# Patient Record
Sex: MALE | Race: WHITE | Employment: OTHER | URBAN - METROPOLITAN AREA
[De-identification: names, ages, dates, MRNs, and addresses within clinical notes are randomized per-mention and may not be internally consistent; named-entity substitution may affect disease eponyms.]

---

## 2023-01-04 ENCOUNTER — APPOINTMENT (OUTPATIENT)
Dept: GENERAL RADIOLOGY | Age: 63
DRG: 143 | End: 2023-01-04
Attending: STUDENT IN AN ORGANIZED HEALTH CARE EDUCATION/TRAINING PROGRAM
Payer: MEDICAID

## 2023-01-04 ENCOUNTER — HOSPITAL ENCOUNTER (INPATIENT)
Age: 63
LOS: 14 days | Discharge: NURSING, REHAB OR LONG-TERM CARE | DRG: 143 | End: 2023-01-18
Attending: STUDENT IN AN ORGANIZED HEALTH CARE EDUCATION/TRAINING PROGRAM | Admitting: INTERNAL MEDICINE
Payer: MEDICAID

## 2023-01-04 DIAGNOSIS — R77.8 ELEVATED TROPONIN: ICD-10-CM

## 2023-01-04 DIAGNOSIS — J96.01 ACUTE RESPIRATORY FAILURE WITH HYPOXIA (HCC): Primary | ICD-10-CM

## 2023-01-04 DIAGNOSIS — J10.1 INFLUENZA A: ICD-10-CM

## 2023-01-04 DIAGNOSIS — J18.9 PNEUMONIA OF BOTH LUNGS DUE TO INFECTIOUS ORGANISM, UNSPECIFIED PART OF LUNG: ICD-10-CM

## 2023-01-04 DIAGNOSIS — E87.0 HYPERNATREMIA: ICD-10-CM

## 2023-01-04 DIAGNOSIS — N17.9 AKI (ACUTE KIDNEY INJURY) (HCC): ICD-10-CM

## 2023-01-04 LAB
ALBUMIN SERPL-MCNC: 2.7 G/DL (ref 3.5–5)
ALBUMIN/GLOB SERPL: 0.7 (ref 1.1–2.2)
ALP SERPL-CCNC: 152 U/L (ref 45–117)
ALT SERPL-CCNC: 14 U/L (ref 12–78)
ANION GAP SERPL CALC-SCNC: 5 MMOL/L (ref 5–15)
AST SERPL-CCNC: 19 U/L (ref 15–37)
ATRIAL RATE: 97 BPM
BASOPHILS # BLD: 0 K/UL (ref 0–0.1)
BASOPHILS NFR BLD: 0 % (ref 0–1)
BILIRUB SERPL-MCNC: 0.6 MG/DL (ref 0.2–1)
BNP SERPL-MCNC: ABNORMAL PG/ML
BUN SERPL-MCNC: 48 MG/DL (ref 6–20)
BUN/CREAT SERPL: 32 (ref 12–20)
CALCIUM SERPL-MCNC: 9.1 MG/DL (ref 8.5–10.1)
CALCULATED P AXIS, ECG09: 56 DEGREES
CALCULATED R AXIS, ECG10: 85 DEGREES
CALCULATED T AXIS, ECG11: 76 DEGREES
CHLORIDE SERPL-SCNC: 118 MMOL/L (ref 97–108)
CO2 SERPL-SCNC: 34 MMOL/L (ref 21–32)
COVID-19 RAPID TEST, COVR: NOT DETECTED
CREAT SERPL-MCNC: 1.49 MG/DL (ref 0.7–1.3)
DIAGNOSIS, 93000: NORMAL
DIFFERENTIAL METHOD BLD: ABNORMAL
EOSINOPHIL # BLD: 0 K/UL (ref 0–0.4)
EOSINOPHIL NFR BLD: 0 % (ref 0–7)
ERYTHROCYTE [DISTWIDTH] IN BLOOD BY AUTOMATED COUNT: 17.7 % (ref 11.5–14.5)
FLUAV AG NPH QL IA: POSITIVE
FLUBV AG NOSE QL IA: NEGATIVE
GLOBULIN SER CALC-MCNC: 3.7 G/DL (ref 2–4)
GLUCOSE SERPL-MCNC: 118 MG/DL (ref 65–100)
HCT VFR BLD AUTO: 31.8 % (ref 36.6–50.3)
HGB BLD-MCNC: 8.8 G/DL (ref 12.1–17)
IMM GRANULOCYTES # BLD AUTO: 0.2 K/UL (ref 0–0.04)
IMM GRANULOCYTES NFR BLD AUTO: 1 % (ref 0–0.5)
LACTATE BLD-SCNC: 0.88 MMOL/L (ref 0.4–2)
LYMPHOCYTES # BLD: 0.7 K/UL (ref 0.8–3.5)
LYMPHOCYTES NFR BLD: 4 % (ref 12–49)
MAGNESIUM SERPL-MCNC: 2.3 MG/DL (ref 1.6–2.4)
MCH RBC QN AUTO: 25.8 PG (ref 26–34)
MCHC RBC AUTO-ENTMCNC: 27.7 G/DL (ref 30–36.5)
MCV RBC AUTO: 93.3 FL (ref 80–99)
MONOCYTES # BLD: 0.7 K/UL (ref 0–1)
MONOCYTES NFR BLD: 4 % (ref 5–13)
NEUTS SEG # BLD: 15 K/UL (ref 1.8–8)
NEUTS SEG NFR BLD: 91 % (ref 32–75)
NRBC # BLD: 0.03 K/UL (ref 0–0.01)
NRBC BLD-RTO: 0.2 PER 100 WBC
P-R INTERVAL, ECG05: 162 MS
PHOSPHATE SERPL-MCNC: 3.4 MG/DL (ref 2.6–4.7)
PLATELET # BLD AUTO: 162 K/UL (ref 150–400)
PMV BLD AUTO: 11.6 FL (ref 8.9–12.9)
POTASSIUM SERPL-SCNC: 3.8 MMOL/L (ref 3.5–5.1)
PROT SERPL-MCNC: 6.4 G/DL (ref 6.4–8.2)
Q-T INTERVAL, ECG07: 404 MS
QRS DURATION, ECG06: 130 MS
QTC CALCULATION (BEZET), ECG08: 513 MS
RBC # BLD AUTO: 3.41 M/UL (ref 4.1–5.7)
RBC MORPH BLD: ABNORMAL
RBC MORPH BLD: ABNORMAL
SODIUM SERPL-SCNC: 157 MMOL/L (ref 136–145)
SOURCE, COVRS: NORMAL
TROPONIN-HIGH SENSITIVITY: 132 NG/L (ref 0–76)
VENTRICULAR RATE, ECG03: 97 BPM
WBC # BLD AUTO: 16.6 K/UL (ref 4.1–11.1)

## 2023-01-04 PROCEDURE — 87040 BLOOD CULTURE FOR BACTERIA: CPT

## 2023-01-04 PROCEDURE — 74011000250 HC RX REV CODE- 250: Performed by: GENERAL ACUTE CARE HOSPITAL

## 2023-01-04 PROCEDURE — 65270000046 HC RM TELEMETRY

## 2023-01-04 PROCEDURE — 36415 COLL VENOUS BLD VENIPUNCTURE: CPT

## 2023-01-04 PROCEDURE — 74011250636 HC RX REV CODE- 250/636: Performed by: STUDENT IN AN ORGANIZED HEALTH CARE EDUCATION/TRAINING PROGRAM

## 2023-01-04 PROCEDURE — 74011000258 HC RX REV CODE- 258: Performed by: INTERNAL MEDICINE

## 2023-01-04 PROCEDURE — 85025 COMPLETE CBC W/AUTO DIFF WBC: CPT

## 2023-01-04 PROCEDURE — 87635 SARS-COV-2 COVID-19 AMP PRB: CPT

## 2023-01-04 PROCEDURE — 93005 ELECTROCARDIOGRAM TRACING: CPT

## 2023-01-04 PROCEDURE — 84100 ASSAY OF PHOSPHORUS: CPT

## 2023-01-04 PROCEDURE — 74011250637 HC RX REV CODE- 250/637: Performed by: GENERAL ACUTE CARE HOSPITAL

## 2023-01-04 PROCEDURE — 74011250636 HC RX REV CODE- 250/636: Performed by: INTERNAL MEDICINE

## 2023-01-04 PROCEDURE — 83880 ASSAY OF NATRIURETIC PEPTIDE: CPT

## 2023-01-04 PROCEDURE — 99285 EMERGENCY DEPT VISIT HI MDM: CPT

## 2023-01-04 PROCEDURE — 83605 ASSAY OF LACTIC ACID: CPT

## 2023-01-04 PROCEDURE — 74011000258 HC RX REV CODE- 258: Performed by: STUDENT IN AN ORGANIZED HEALTH CARE EDUCATION/TRAINING PROGRAM

## 2023-01-04 PROCEDURE — 71045 X-RAY EXAM CHEST 1 VIEW: CPT

## 2023-01-04 PROCEDURE — 83735 ASSAY OF MAGNESIUM: CPT

## 2023-01-04 PROCEDURE — 84484 ASSAY OF TROPONIN QUANT: CPT

## 2023-01-04 PROCEDURE — 87804 INFLUENZA ASSAY W/OPTIC: CPT

## 2023-01-04 PROCEDURE — 80053 COMPREHEN METABOLIC PANEL: CPT

## 2023-01-04 PROCEDURE — 74011250636 HC RX REV CODE- 250/636: Performed by: GENERAL ACUTE CARE HOSPITAL

## 2023-01-04 RX ORDER — OSELTAMIVIR PHOSPHATE 30 MG/1
30 CAPSULE ORAL 2 TIMES DAILY
Status: COMPLETED | OUTPATIENT
Start: 2023-01-04 | End: 2023-01-09

## 2023-01-04 RX ORDER — ACETAMINOPHEN 325 MG/1
650 TABLET ORAL
Status: DISCONTINUED | OUTPATIENT
Start: 2023-01-04 | End: 2023-01-18 | Stop reason: HOSPADM

## 2023-01-04 RX ORDER — ENOXAPARIN SODIUM 100 MG/ML
40 INJECTION SUBCUTANEOUS DAILY
Status: DISCONTINUED | OUTPATIENT
Start: 2023-01-05 | End: 2023-01-18 | Stop reason: HOSPADM

## 2023-01-04 RX ORDER — SODIUM CHLORIDE 9 MG/ML
50 INJECTION, SOLUTION INTRAVENOUS CONTINUOUS
Status: DISPENSED | OUTPATIENT
Start: 2023-01-04 | End: 2023-01-04

## 2023-01-04 RX ORDER — ONDANSETRON 2 MG/ML
4 INJECTION INTRAMUSCULAR; INTRAVENOUS
Status: DISCONTINUED | OUTPATIENT
Start: 2023-01-04 | End: 2023-01-18 | Stop reason: HOSPADM

## 2023-01-04 RX ORDER — ONDANSETRON 4 MG/1
4 TABLET, ORALLY DISINTEGRATING ORAL
Status: DISCONTINUED | OUTPATIENT
Start: 2023-01-04 | End: 2023-01-18 | Stop reason: HOSPADM

## 2023-01-04 RX ORDER — ACETAMINOPHEN 650 MG/1
650 SUPPOSITORY RECTAL
Status: DISCONTINUED | OUTPATIENT
Start: 2023-01-04 | End: 2023-01-18 | Stop reason: HOSPADM

## 2023-01-04 RX ORDER — SODIUM CHLORIDE 0.9 % (FLUSH) 0.9 %
5-40 SYRINGE (ML) INJECTION EVERY 8 HOURS
Status: DISCONTINUED | OUTPATIENT
Start: 2023-01-04 | End: 2023-01-18 | Stop reason: HOSPADM

## 2023-01-04 RX ORDER — POLYETHYLENE GLYCOL 3350 17 G/17G
17 POWDER, FOR SOLUTION ORAL DAILY PRN
Status: DISCONTINUED | OUTPATIENT
Start: 2023-01-04 | End: 2023-01-18 | Stop reason: HOSPADM

## 2023-01-04 RX ORDER — LEVOFLOXACIN 5 MG/ML
750 INJECTION, SOLUTION INTRAVENOUS ONCE
Status: COMPLETED | OUTPATIENT
Start: 2023-01-04 | End: 2023-01-04

## 2023-01-04 RX ORDER — DEXTROSE MONOHYDRATE 50 MG/ML
50 INJECTION, SOLUTION INTRAVENOUS CONTINUOUS
Status: DISCONTINUED | OUTPATIENT
Start: 2023-01-04 | End: 2023-01-18 | Stop reason: HOSPADM

## 2023-01-04 RX ORDER — SODIUM CHLORIDE 0.9 % (FLUSH) 0.9 %
5-40 SYRINGE (ML) INJECTION AS NEEDED
Status: DISCONTINUED | OUTPATIENT
Start: 2023-01-04 | End: 2023-01-18 | Stop reason: HOSPADM

## 2023-01-04 RX ADMIN — CEFEPIME 2 G: 2 INJECTION, POWDER, FOR SOLUTION INTRAVENOUS at 10:05

## 2023-01-04 RX ADMIN — SODIUM CHLORIDE, PRESERVATIVE FREE 10 ML: 5 INJECTION INTRAVENOUS at 18:16

## 2023-01-04 RX ADMIN — OSELTAMIVIR PHOSPHATE 30 MG: 30 CAPSULE ORAL at 20:35

## 2023-01-04 RX ADMIN — SODIUM CHLORIDE, PRESERVATIVE FREE 10 ML: 5 INJECTION INTRAVENOUS at 20:35

## 2023-01-04 RX ADMIN — PIPERACILLIN AND TAZOBACTAM 4.5 G: 4; .5 INJECTION, POWDER, FOR SOLUTION INTRAVENOUS at 18:34

## 2023-01-04 RX ADMIN — DEXTROSE MONOHYDRATE 75 ML/HR: 50 INJECTION, SOLUTION INTRAVENOUS at 18:34

## 2023-01-04 RX ADMIN — SODIUM CHLORIDE 50 ML/HR: 9 INJECTION, SOLUTION INTRAVENOUS at 15:29

## 2023-01-04 RX ADMIN — LEVOFLOXACIN 750 MG: 5 INJECTION, SOLUTION INTRAVENOUS at 10:08

## 2023-01-04 RX ADMIN — SODIUM CHLORIDE 500 ML: 9 INJECTION, SOLUTION INTRAVENOUS at 10:02

## 2023-01-04 NOTE — ED NOTES
Patient is asking for sips of water but on npo    said patient had a Sips of water   Around 60 ml yesterday and had no issues.

## 2023-01-04 NOTE — PROGRESS NOTES
Patient had 6 beats of VT   MD notified via [employer]  Patient had 12 beats of VT Patient is AO x 3. No complaints of chest pain, palpitation. EKG completed   Made night RN aware that unable to add on lab. Bedside and Verbal shift change report given to Venice Xiong RN (oncoming nurse) by Eulalio Hartmann RN (offgoing nurse). Report included the following information SBAR, Kardex, and Quality Measures.

## 2023-01-04 NOTE — H&P
Hospitalist Admission Note    NAME: Mahamed Diana   :  1960   MRN:  [id number]     Date/Time:  2023 5:35 PM    Patient PCP: Unknown, Provider, MD  ______________________________________________________________________  Given the patient's current clinical presentation, I have a high level of concern for decompensation if discharged from the emergency department. Complex decision making was performed, which includes reviewing the patient's available past medical records, laboratory results, and x-ray films. My assessment of this patient's clinical condition and my plan of care is as follows. Assessment / Plan:    Acute on chronic hypoxic respiratory failure  Trach dislodgement  Influenza A infection   H/o Head and neck Ca  On Hospice care   HARVEY, Cr was 1.1 on 22  Hypernatremia/hyperchloremia/dehydration     Anemia    Start Abx  Wean off O2 as possible, pt requires high flow now, will need to have that addressed prior to DC back to NH  Start tamiflu   F/up with CM re hospice status   ABx  Start IVF w D5W. I/O and daily weight   Resume tube feeding at low rate, nutritionist consult. Pt already on PEG tube feeding at NH   Pt does not seem to be on any meds at NH  As per report pt just started on low volume po fluids, will cont that for now    Addendum  Called back by on call Hospice NP from [address], will re assess pt in see if he meets for GIP. Code Status: DNR, was under hospice  Surrogate Decision Maker: Chantal Collet, [phone]. Hospice/[facility] [phone]  DVT Prophylaxis: lovenox   GI Prophylaxis: not indicated  Baseline: PEG/Trach under hospice care at [facility]      Subjective:   CHIEF COMPLAINT: Trach dislodged     HISTORY OF PRESENT ILLNESS:     Mahamed Diana is a 57 y.o.  male who presents with the above CC.   Pt with h/o GI bleed, liver cirrhosis, metastatic head and neck Ca s/p PEG/Trach, CAP w Pseudomonas, Severe protein calorie deficiency, from NH under hospice care, pt noted to have Trach partially dislodged and NH staph could not replace it, EMS called, pt found to be sating ~75%. Pt did not report any unusual symptoms other than feeling thirsty. I called [facility]/hospice, awaiting call back from on call NP. XR CHEST PORT    Result Date: 1/4/2023  Hyperinflated lung fields with diffuse bilateral interstitial infiltrates. We were asked to admit for work up and evaluation of the above problems. Past Medical History:   Diagnosis Date    HARVEY (acute kidney injury) (CMS/HCC) 05/17/2021    Anxiety 08/06/2021    History of chemotherapy    Hx of radiation therapy    Hypertrophic obstructive cardiomyopathy (CMS/HCC) 02/02/2021   Echocardiogram 01/20/2021 showed concentric left ventricular hypertrophy most prominent in the interventricular septum, hyperkinetic systolic function ejection fraction of 70%, no significant valvular disease, normal right heart size and pressures, moderate to severe dynamic LVOT with mean gradient of 34 mmHg across the outflow tract, HOCM. Oropharyngeal cancer (CMS/HCC)    Other cirrhosis of liver (CMS/HCC) 05/11/2021    PEG (percutaneous endoscopic gastrostomy) status (CMS/HCC) 02/12/2021   Is supposed to be feeding with two pascale 5 times a day but only managing 4 due to \"clogging\". Presence of tracheostomy (CMS/HCC) 02/12/2021   Occasional bleeding in inner cannula. Not using humidifcation.  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 Luis Conns, SINGLE    ND LARYNGOSCOPY,DIRCT,OP SCOPE,BIOPSY N/A 01/14/2021   Procedure: DIRECT LARYNGOSCOPY /W BIOPSY; Surgeon: Dolly Lang MD; Location: [facility] Main OR; Service: ENT       Past Medical History:   Diagnosis Date    Cancer ([facility] [address]     Tongue and lung        Past Surgical History:   Procedure Laterality Date    HX OTHER SURGICAL      HX VASCULAR ACCESS      ND UNLISTED PROCEDURE ABDOMEN PERITONEUM & OMENTUM         Social History     Tobacco Use    Smoking status: Not on file    Smokeless tobacco: Not on file   Substance Use Topics    Alcohol use: Not on file        Family History:   Diabetes Mother    Cancer Mother    Cancer Father     No Known Allergies     Prior to Admission medications    Not on File       REVIEW OF SYSTEMS:     Total of 12 systems reviewed, negative except for the above. I am not able to complete the review of systems because: The patient is intubated and sedated    The patient has altered mental status due to his acute medical problems    The patient has baseline aphasia from prior stroke(s)    The patient has baseline dementia and is not reliable historian    The patient is in acute medical distress and unable to provide information             POSITIVE= underlined text  Negative = text not underlined  General:  fever, chills, sweats, generalized weakness, weight loss/gain,      loss of appetite   Eyes:    blurred vision, eye pain, loss of vision, double vision  ENT:    rhinorrhea, pharyngitis   Respiratory:   cough, sputum production, SOB, CAI, wheezing, pleuritic pain.  Trach dislodgment    Cardiology:   chest pain, palpitations, orthopnea, PND, edema, syncope   Gastrointestinal:  abdominal pain , N/V, diarrhea, dysphagia, constipation, bleeding   Genitourinary:  frequency, urgency, dysuria, hematuria, incontinence   Muskuloskeletal :  arthralgia, myalgia, back pain  Hematology:  easy bruising, nose or gum bleeding, lymphadenopathy Dermatological: rash, ulceration, pruritis, color change / jaundice  Endocrine:   hot flashes or polydipsia   Neurological:  headache, dizziness, confusion, focal weakness, paresthesia,     Speech difficulties, memory loss, gait difficulty  Psychological: Feelings of anxiety, depression, agitation    Objective:   VITALS:    Visit Vitals  /76   Pulse 89   Temp 97.8 °F (36.6 °C)   Resp 21   Wt 53.7 kg (118 lb 6.2 oz)   SpO2 95%     No intake or output data in the 24 hours ending 01/04/23 1735   Wt Readings from Last 10 Encounters:   01/04/23 53.7 kg (118 lb 6.2 oz)       PHYSICAL EXAM:    General:    Alert, cooperative, no distress, ill appearing, cachectic. HEENT: Atraumatic, anicteric sclerae, pink conjunctivae, MMM  Neck:  Supple, symmetrical  Lungs:   CTA. No Wheezing/Rhonchi. No rales. No tenderness  No Accessory muscle use. Trach  CVS:   Regular rhythm. No murmur. No JVD   GI/:   Soft, NT. ND.  BS normal. PEG  Extremities: No edema. No cyanosis. No clubbing. Skin:     Not pale. Not Jaundiced. No rashes   Psych:  Good insight. Not depressed. Not anxious or agitated.   Neurologic: Alert and awake    _______________________________________________________________________  Care Plan discussed with:    Comments   Patient x    Family      RN x    Care Manager                    Consultant:      _______________________________________________________________________  Expected  Disposition:   Home with Family x   HH/PT/OT/RN    SNF/LTC    CONOR    ________________________________________________________________________  TOTAL TIME:  54 Minutes    Critical Care Provided     Minutes non procedure based      Comments    x Reviewed previous records   >50% of visit spent in counseling and coordination of care x Discussion with patient and/or family and questions answered       ________________________________________________________________________  Signed: Dao White MD    Procedures: see electronic medical records for all procedures/Xrays and details which were not copied into this note but were reviewed prior to creation of Plan. LAB DATA REVIEWED:    Recent Results (from the past 24 hour(s))   CBC WITH AUTOMATED DIFF    Collection Time: 01/04/23  7:00 AM   Result Value Ref Range    WBC 16.6 (H) 4.1 - 11.1 K/uL    RBC 3.41 (L) 4.10 - 5.70 M/uL    HGB 8.8 (L) 12.1 - 17.0 g/dL    HCT 31.8 (L) 36.6 - 50.3 %    MCV 93.3 80.0 - 99.0 FL    MCH 25.8 (L) 26.0 - 34.0 PG    MCHC 27.7 (L) 30.0 - 36.5 g/dL    RDW 17.7 (H) 11.5 - 14.5 %    PLATELET 546 506 - 622 K/uL    MPV 11.6 8.9 - 12.9 FL    NRBC 0.2 (H) 0  WBC    ABSOLUTE NRBC 0.03 (H) 0.00 - 0.01 K/uL    NEUTROPHILS 91 (H) 32 - 75 %    LYMPHOCYTES 4 (L) 12 - 49 %    MONOCYTES 4 (L) 5 - 13 %    EOSINOPHILS 0 0 - 7 %    BASOPHILS 0 0 - 1 %    IMMATURE GRANULOCYTES 1 (H) 0.0 - 0.5 %    ABS. NEUTROPHILS 15.0 (H) 1.8 - 8.0 K/UL    ABS. LYMPHOCYTES 0.7 (L) 0.8 - 3.5 K/UL    ABS. MONOCYTES 0.7 0.0 - 1.0 K/UL    ABS. EOSINOPHILS 0.0 0.0 - 0.4 K/UL    ABS. BASOPHILS 0.0 0.0 - 0.1 K/UL    ABS. IMM. GRANS. 0.2 (H) 0.00 - 0.04 K/UL    DF SMEAR SCANNED      RBC COMMENTS ANISOCYTOSIS  1+        RBC COMMENTS HYPOCHROMIA  1+       METABOLIC PANEL, COMPREHENSIVE    Collection Time: 01/04/23  7:00 AM   Result Value Ref Range    Sodium 157 (H) 136 - 145 mmol/L    Potassium 3.8 3.5 - 5.1 mmol/L    Chloride 118 (H) 97 - 108 mmol/L    CO2 34 (H) 21 - 32 mmol/L    Anion gap 5 5 - 15 mmol/L    Glucose 118 (H) 65 - 100 mg/dL    BUN 48 (H) 6 - 20 MG/DL    Creatinine 1.49 (H) 0.70 - 1.30 MG/DL    BUN/Creatinine ratio 32 (H) 12 - 20      eGFR 52 (L) >60 ml/min/1.73m2    Calcium 9.1 8.5 - 10.1 MG/DL    Bilirubin, total 0.6 0.2 - 1.0 MG/DL    ALT (SGPT) 14 12 - 78 U/L    AST (SGOT) 19 15 - 37 U/L    Alk.  phosphatase 152 (H) 45 - 117 U/L    Protein, total 6.4 6.4 - 8.2 g/dL    Albumin 2.7 (L) 3.5 - 5.0 g/dL    Globulin 3.7 2.0 - 4.0 g/dL    A-G Ratio 0.7 (L) 1.1 - 2.2     NT-PRO BNP Collection Time: 01/04/23  7:00 AM   Result Value Ref Range    NT pro-BNP 31,223 (H) <125 PG/ML   TROPONIN-HIGH SENSITIVITY    Collection Time: 01/04/23  7:00 AM   Result Value Ref Range    Troponin-High Sensitivity 132 (HH) 0 - 76 ng/L   COVID-19 RAPID TEST    Collection Time: 01/04/23  7:00 AM   Result Value Ref Range    Specimen source Nasopharyngeal      COVID-19 rapid test Not detected NOTD     INFLUENZA A+B VIRAL AGS    Collection Time: 01/04/23  7:00 AM   Result Value Ref Range    Influenza A Antigen Positive (A) NEG      Influenza B Antigen Negative NEG     EKG, 12 LEAD, INITIAL    Collection Time: 01/04/23  7:10 AM   Result Value Ref Range    Ventricular Rate 97 BPM    Atrial Rate 97 BPM    P-R Interval 162 ms    QRS Duration 130 ms    Q-T Interval 404 ms    QTC Calculation (Bezet) 513 ms    Calculated P Axis 56 degrees    Calculated R Axis 85 degrees    Calculated T Axis 76 degrees    Diagnosis       Sinus rhythm with occasional premature ventricular complexes  Right bundle branch block  No previous ECGs available  Confirmed by Solis Pimentel ([id number]) on 1/4/2023 10:26:00 AM     POC LACTIC ACID    Collection Time: 01/04/23  7:19 AM   Result Value Ref Range    Lactic Acid (POC) 0.88 0.40 - 2.00 mmol/L     XR CHEST PORT    Result Date: 1/4/2023  Hyperinflated lung fields with diffuse bilateral interstitial infiltrates.            Current Medications:     Current Facility-Administered Medications:     0.9% sodium chloride infusion, 50 mL/hr, IntraVENous, CONTINUOUS, Mariela Tavares MD, Last Rate: 50 mL/hr at 01/04/23 1529, 50 mL/hr at 01/04/23 1529

## 2023-01-04 NOTE — ED NOTES
Patient is stable   Hooked in cardiac monitor   No complaints of pain   On high flow at 30 LPM   Tracheostomy intact  PEG tube intact  IV cannula infusing well   All due medications given   Report given to [address] [place] [address]

## 2023-01-04 NOTE — PROGRESS NOTES
Patient arrived on NRB and dislodged trach around neck. #4 shiley cuffless trach was reinserted without difficulty and patient was placed on 10L 50% trach collar. Patient's trach was suctioned with copious green secretions.

## 2023-01-04 NOTE — ED NOTES
Visited by Morris Sarmiento ([phone])    jostin followed up with the patient   Asked if he still want to go back to hospice   And patient responded yes by nodding

## 2023-01-04 NOTE — ED PROVIDER NOTES
EMERGENCY DEPARTMENT HISTORY AND PHYSICAL EXAM      Date: 1/4/2023  Patient Name: Tamica Butts    History of Presenting Illness     Chief Complaint   Patient presents with    Respiratory Distress     Pt arrives via EMS from [facility]. C/o trach dislodged and facility unable to replace. HPI: Tamica Butts, 57 y.o. male presents to the ED with cc of trach dislodged. History obtained from EMS as the patient cannot verbalize at baseline. Overnight, his trach dislodged at some point. Was saturating in the 76s on EMS arrival.  He denies being on oxygen chronically. Has an uncuffed trach at baseline. Has history of head and neck cancer per EMS. Says he was actually feeling sick for a few days, unable to elaborate further than this. There are no other complaints, changes, or physical findings at this time. PCP: No primary care provider on file. No current facility-administered medications on file prior to encounter. No current outpatient medications on file prior to encounter. Past History     Past Medical History:  Head and neck cancer    Past Surgical History:  Trach, PEG    Family History:  History reviewed. No pertinent family history. Social History: Allergies:  No Known Allergies      Physical Exam   Physical Exam  Constitutional:       General: He is not in acute distress. Appearance: He is not toxic-appearing. HENT:      Head: Normocephalic. Mouth/Throat:      Mouth: Mucous membranes are moist.   Eyes:      Extraocular Movements: Extraocular movements intact. Neck:      Comments: Trach stoma appears well-healed, visible tract to trachea, no purulence, bleeding, or discharge surrounding it  Cardiovascular:      Rate and Rhythm: Regular rhythm. Tachycardia present. Pulmonary:      Comments: Breath sounds are coarse diffusely, wet cough intermittently, no significant accessory muscle use  Abdominal:      Palpations: Abdomen is soft. Tenderness: There is no abdominal tenderness. Comments: PEG in place without surrounding erythema or induration   Musculoskeletal:         General: No deformity. Skin:     General: Skin is warm and dry. Neurological:      Mental Status: He is alert. Comments: He is alert, nonverbal, follows simple commands, will shake head in response to simple question   Psychiatric:         Mood and Affect: Mood normal.       Diagnostic Study Results     Labs -     Recent Results (from the past 24 hour(s))   CBC WITH AUTOMATED DIFF    Collection Time: 01/04/23  7:00 AM   Result Value Ref Range    WBC 16.6 (H) 4.1 - 11.1 K/uL    RBC 3.41 (L) 4.10 - 5.70 M/uL    HGB 8.8 (L) 12.1 - 17.0 g/dL    HCT 31.8 (L) 36.6 - 50.3 %    MCV 93.3 80.0 - 99.0 FL    MCH 25.8 (L) 26.0 - 34.0 PG    MCHC 27.7 (L) 30.0 - 36.5 g/dL    RDW 17.7 (H) 11.5 - 14.5 %    PLATELET 570 196 - 448 K/uL    MPV 11.6 8.9 - 12.9 FL    NRBC 0.2 (H) 0  WBC    ABSOLUTE NRBC 0.03 (H) 0.00 - 0.01 K/uL    NEUTROPHILS 91 (H) 32 - 75 %    LYMPHOCYTES 4 (L) 12 - 49 %    MONOCYTES 4 (L) 5 - 13 %    EOSINOPHILS 0 0 - 7 %    BASOPHILS 0 0 - 1 %    IMMATURE GRANULOCYTES 1 (H) 0.0 - 0.5 %    ABS. NEUTROPHILS 15.0 (H) 1.8 - 8.0 K/UL    ABS. LYMPHOCYTES 0.7 (L) 0.8 - 3.5 K/UL    ABS. MONOCYTES 0.7 0.0 - 1.0 K/UL    ABS. EOSINOPHILS 0.0 0.0 - 0.4 K/UL    ABS. BASOPHILS 0.0 0.0 - 0.1 K/UL    ABS. IMM.  GRANS. 0.2 (H) 0.00 - 0.04 K/UL    DF SMEAR SCANNED      RBC COMMENTS ANISOCYTOSIS  1+        RBC COMMENTS HYPOCHROMIA  1+       METABOLIC PANEL, COMPREHENSIVE    Collection Time: 01/04/23  7:00 AM   Result Value Ref Range    Sodium 157 (H) 136 - 145 mmol/L    Potassium 3.8 3.5 - 5.1 mmol/L    Chloride 118 (H) 97 - 108 mmol/L    CO2 34 (H) 21 - 32 mmol/L    Anion gap 5 5 - 15 mmol/L    Glucose 118 (H) 65 - 100 mg/dL    BUN 48 (H) 6 - 20 MG/DL    Creatinine 1.49 (H) 0.70 - 1.30 MG/DL    BUN/Creatinine ratio 32 (H) 12 - 20      eGFR 52 (L) >60 ml/min/1.73m2    Calcium 9.1 8.5 - 10.1 MG/DL    Bilirubin, total 0.6 0.2 - 1.0 MG/DL    ALT (SGPT) 14 12 - 78 U/L    AST (SGOT) 19 15 - 37 U/L    Alk. phosphatase 152 (H) 45 - 117 U/L    Protein, total 6.4 6.4 - 8.2 g/dL    Albumin 2.7 (L) 3.5 - 5.0 g/dL    Globulin 3.7 2.0 - 4.0 g/dL    A-G Ratio 0.7 (L) 1.1 - 2.2     NT-PRO BNP    Collection Time: 01/04/23  7:00 AM   Result Value Ref Range    NT pro-BNP 31,223 (H) <125 PG/ML   TROPONIN-HIGH SENSITIVITY    Collection Time: 01/04/23  7:00 AM   Result Value Ref Range    Troponin-High Sensitivity 132 (HH) 0 - 76 ng/L   COVID-19 RAPID TEST    Collection Time: 01/04/23  7:00 AM   Result Value Ref Range    Specimen source Nasopharyngeal      COVID-19 rapid test Not detected NOTD     INFLUENZA A+B VIRAL AGS    Collection Time: 01/04/23  7:00 AM   Result Value Ref Range    Influenza A Antigen Positive (A) NEG      Influenza B Antigen Negative NEG     EKG, 12 LEAD, INITIAL    Collection Time: 01/04/23  7:10 AM   Result Value Ref Range    Ventricular Rate 97 BPM    Atrial Rate 97 BPM    P-R Interval 162 ms    QRS Duration 130 ms    Q-T Interval 404 ms    QTC Calculation (Bezet) 513 ms    Calculated P Axis 56 degrees    Calculated R Axis 85 degrees    Calculated T Axis 76 degrees    Diagnosis       Sinus rhythm with occasional premature ventricular complexes  Right bundle branch block  No previous ECGs available     POC LACTIC ACID    Collection Time: 01/04/23  7:19 AM   Result Value Ref Range    Lactic Acid (POC) 0.88 0.40 - 2.00 mmol/L       Radiologic Studies -   XR CHEST PORT   Final Result      Hyperinflated lung fields with diffuse bilateral interstitial infiltrates. CT Results  (Last 48 hours)      None          CXR Results  (Last 48 hours)                 01/04/23 0741  XR CHEST PORT Final result    Impression:      Hyperinflated lung fields with diffuse bilateral interstitial infiltrates.            Narrative:  EXAM:  XR CHEST PORT       INDICATION: Cough, shortness of breath COMPARISON: none       TECHNIQUE: portable chest AP view       FINDINGS: The cardiac silhouette is within normal limits. The pulmonary   vasculature is within normal limits. The lungs are hyperinflated. Diffuse bilateral interstitial infiltrates are   noted. Port-A-Cath is present with the tip projecting over the SVC. Tracheostomy   tube projects in satisfactory position. The visualized bones and upper abdomen   are age-appropriate. Medical Decision Making   I am the first provider for this patient. I reviewed the vital signs, available nursing notes, past medical history, past surgical history, family history and social history. Vital Signs-Reviewed the patient's vital signs. Patient Vitals for the past 24 hrs:   Temp Pulse Resp BP SpO2   01/04/23 0815 -- 93 21 92/64 93 %   01/04/23 0741 -- 99 20 107/83 90 %   01/04/23 0726 99.5 °F (37.5 °C) 97 21 106/72 90 %   01/04/23 0722 -- -- -- -- 90 %   01/04/23 0657 -- -- -- -- (!) 83 %   01/04/23 0651 -- (!) 105 -- -- 92 %   01/04/23 0645 -- -- -- -- 93 %         Provider Notes (Medical Decision Making):   73-year-old male presenting with dislodged trach. Difficult historian given he is nonverbal, chronically trached and pegged. Apparently has not been feeling well for a few days, and trach dislodged overnight. The trach is easily placed back into the stoma with improvement in saturations to the low 90s. However, continues to have junky breath sounds and desats again to the low 80s. ED Course:     Initial assessment performed. The patients presenting problems have been discussed, and they are in agreement with the care plan formulated and outlined with them. I have encouraged them to ask questions as they arise throughout their visit. Patient is suctioned, placed back on oxygen with improvement in saturations to 90%. Differential includes bronchitis, pneumonia, mucous plug, COVID or influenza, pulmonary edema.             I spoken with the patient's emergency contact, Mr. Mick Quiroz, the patient gives me permission to discuss his medical information with him. The emergency contact states that as far as he knows, the patient would want to be admitted to the hospital and treated, he makes his own medical decisions, confirms it is difficult to communicate with him, however symptoms he will right. Spoke with the patient's hospice facility to help get more information. As far as they know, he also is his own medical decision maker as he checked himself into hospice initially. The only information as far as other contact members are consistent with a gentleman already spoke with. They do not know more about his recent health. CBC with leukocytosis of 16.6, anemia hemoglobin 8.8, basic metabolic panel with elevated BUN/creatinine, with elevated BUN of 48, suspect likely HARVEY and dehydration. Sodium is elevated at 157. Troponin is  Elevated 132. The patient denies chest pain. I suspect likely demand ischemia in setting of critical illness. Influenza is positive, he is informed of this. Chest x-ray shows diffuse bilateral interstitial infiltrates. Given elevated white blood cell count and hypoxia, he will also be treated for pneumonia. IV antibiotics ordered. On reevaluation, he is saturating 88% on oxygen through his trach, he is placed on nasal cannula as well, called respiratory therapy for suction, they have also placed him on heated high flow, this improves his saturations to 93%. He continues to breathe comfortably. EKG is performed at 7: 10, shows sinus rhythm at a rate of 97, , , QTc 513, axis upright, right bundle branch block, no ST segment elevation or depression concerning for ACS, this is interpreted independently by myself  as sinus rhythm with right bundle branch block and PVC. Patient is resting comfortably on reevaluation, continues to saturate well on high flow.   Attempted to communicate further with him regarding his wishes. He does not nod his head when asked if he wants to be admitted to the hospital and treated. I have attempted to get him to write on a piece of paper, however he does not successfully do so. Critical Care Time:     I have spent 75 minutes of critical care time in evaluating and treating this patient. This includes time spent at bedside, time with family and decision makers, documentation, review of labs and imaging, and/or consultation with specialists. It does not include time spent on separately billed procedures. This patient presents with a critical illness or injury that acutely impairs one or more vital organ systems such that there is a high probability of imminent or life threatening deterioration in the patient's condition. This case involved decision making of high complexity to assess, manipulate, and support vital organ system failure and/or to prevent further life threatening deterioration of the patient's condition. Failure to initiate these interventions on an urgent basis would likely result in sudden, clinically significant or life threatening deterioration in the patient's condition. Abnormal findings supporting critical care: Hypoxia, elevated troponin, tachycardia  Interventions to support critical care: Oxygen, IV fluid, antibiotics, heated high flow  Failure to intervene may result in: Hypoxic respiratory failure, cardiopulmonary compromise, dehydration, electrolyte abnormalities    Disposition:  Admit to stepdown      PLAN:  1. There are no discharge medications for this patient.     2.   Follow-up Information    None       Return to ED if worse     Diagnosis     Clinical Impression: Acute hypoxic respiratory failure secondary to influenza and pneumonia, acute dislodged trach, HARVEY, acute hyponatremia, acute elevated troponin, chronic trach

## 2023-01-04 NOTE — ED NOTES
Per EMS, Pt found alert and oriented and \"didn't look sick\" in bed on scene on room air, O2 75%. Increased to 95% with NRB. Blood pressure en route 86/68, 500mL NS administered, updated BP 98/64. Reported right sided jaw pain, but Pt is currently pointing to left side of jaw. Hx lung, throat, mouth, tongue cancer. Gurgling sounds noted on scene, en route, and upon arrival to ED.

## 2023-01-04 NOTE — PROGRESS NOTES
Pharmacy Dosing Services: 1/4/2023      The following medication: zosyn was automatically dose-adjusted per P&T Committee Protocol, with respect to renal function. Previous regimen: zosyn 2.25 g q 6 h   New Regimen: zosyn 3.375 g q 8 h       Pt Weight:   Wt Readings from Last 1 Encounters:   01/04/23 53.7 kg (118 lb 6.2 oz)         Serum Creatinine Lab Results   Component Value Date/Time    Creatinine 1.49 (H) 01/04/2023 07:00 AM       Creatinine Clearance CrCl cannot be calculated (Unknown ideal weight.). BUN Lab Results   Component Value Date/Time    BUN 48 (H) 01/04/2023 07:00 AM         Additional notes:      Pharmacy to continue to monitor patient daily. Will make dosage adjustments based upon changing renal function.   Reta SotoD  Remote Order Verification Pharmacist  [email or web address] Phone # [phone]

## 2023-01-04 NOTE — ED NOTES
Patient is awake   Nodding he is ok and comfortable   Vitally stable   Saturation of 99 percent   Still in high flow machine

## 2023-01-05 LAB
ALBUMIN SERPL-MCNC: 2.3 G/DL (ref 3.5–5)
ALBUMIN/GLOB SERPL: 0.7 (ref 1.1–2.2)
ALP SERPL-CCNC: 122 U/L (ref 45–117)
ALT SERPL-CCNC: 13 U/L (ref 12–78)
ANION GAP SERPL CALC-SCNC: 2 MMOL/L (ref 5–15)
AST SERPL-CCNC: 22 U/L (ref 15–37)
ATRIAL RATE: 87 BPM
BASOPHILS # BLD: 0 K/UL (ref 0–0.1)
BASOPHILS NFR BLD: 0 % (ref 0–1)
BILIRUB SERPL-MCNC: 0.6 MG/DL (ref 0.2–1)
BUN SERPL-MCNC: 46 MG/DL (ref 6–20)
BUN/CREAT SERPL: 31 (ref 12–20)
CALCIUM SERPL-MCNC: 8.6 MG/DL (ref 8.5–10.1)
CALCULATED P AXIS, ECG09: 99 DEGREES
CALCULATED R AXIS, ECG10: 76 DEGREES
CALCULATED T AXIS, ECG11: 64 DEGREES
CHLORIDE SERPL-SCNC: 116 MMOL/L (ref 97–108)
CO2 SERPL-SCNC: 34 MMOL/L (ref 21–32)
CREAT SERPL-MCNC: 1.5 MG/DL (ref 0.7–1.3)
DIAGNOSIS, 93000: NORMAL
DIFFERENTIAL METHOD BLD: ABNORMAL
EOSINOPHIL # BLD: 0 K/UL (ref 0–0.4)
EOSINOPHIL NFR BLD: 0 % (ref 0–7)
ERYTHROCYTE [DISTWIDTH] IN BLOOD BY AUTOMATED COUNT: 17.8 % (ref 11.5–14.5)
GLOBULIN SER CALC-MCNC: 3.5 G/DL (ref 2–4)
GLUCOSE SERPL-MCNC: 160 MG/DL (ref 65–100)
HCT VFR BLD AUTO: 28.1 % (ref 36.6–50.3)
HGB BLD-MCNC: 8 G/DL (ref 12.1–17)
IMM GRANULOCYTES # BLD AUTO: 0.1 K/UL (ref 0–0.04)
IMM GRANULOCYTES NFR BLD AUTO: 1 % (ref 0–0.5)
LYMPHOCYTES # BLD: 0.6 K/UL (ref 0.8–3.5)
LYMPHOCYTES NFR BLD: 6 % (ref 12–49)
MAGNESIUM SERPL-MCNC: 2.1 MG/DL (ref 1.6–2.4)
MCH RBC QN AUTO: 26 PG (ref 26–34)
MCHC RBC AUTO-ENTMCNC: 28.5 G/DL (ref 30–36.5)
MCV RBC AUTO: 91.2 FL (ref 80–99)
MONOCYTES # BLD: 0.4 K/UL (ref 0–1)
MONOCYTES NFR BLD: 4 % (ref 5–13)
NEUTS SEG # BLD: 8.4 K/UL (ref 1.8–8)
NEUTS SEG NFR BLD: 89 % (ref 32–75)
NRBC # BLD: 0.02 K/UL (ref 0–0.01)
NRBC BLD-RTO: 0.2 PER 100 WBC
P-R INTERVAL, ECG05: 226 MS
PLATELET # BLD AUTO: 124 K/UL (ref 150–400)
PMV BLD AUTO: 11.2 FL (ref 8.9–12.9)
POTASSIUM SERPL-SCNC: 3.3 MMOL/L (ref 3.5–5.1)
PROCALCITONIN SERPL-MCNC: 1.08 NG/ML
PROT SERPL-MCNC: 5.8 G/DL (ref 6.4–8.2)
Q-T INTERVAL, ECG07: 438 MS
QRS DURATION, ECG06: 126 MS
QTC CALCULATION (BEZET), ECG08: 527 MS
RBC # BLD AUTO: 3.08 M/UL (ref 4.1–5.7)
RBC MORPH BLD: ABNORMAL
SODIUM SERPL-SCNC: 152 MMOL/L (ref 136–145)
VENTRICULAR RATE, ECG03: 87 BPM
WBC # BLD AUTO: 9.5 K/UL (ref 4.1–11.1)

## 2023-01-05 PROCEDURE — 74011000258 HC RX REV CODE- 258: Performed by: INTERNAL MEDICINE

## 2023-01-05 PROCEDURE — 74011250636 HC RX REV CODE- 250/636: Performed by: INTERNAL MEDICINE

## 2023-01-05 PROCEDURE — 84145 PROCALCITONIN (PCT): CPT

## 2023-01-05 PROCEDURE — 77010033678 HC OXYGEN DAILY

## 2023-01-05 PROCEDURE — 80053 COMPREHEN METABOLIC PANEL: CPT

## 2023-01-05 PROCEDURE — 74011250637 HC RX REV CODE- 250/637: Performed by: GENERAL ACUTE CARE HOSPITAL

## 2023-01-05 PROCEDURE — 74011250636 HC RX REV CODE- 250/636: Performed by: PHYSICIAN ASSISTANT

## 2023-01-05 PROCEDURE — 65270000046 HC RM TELEMETRY

## 2023-01-05 PROCEDURE — 74011250637 HC RX REV CODE- 250/637: Performed by: INTERNAL MEDICINE

## 2023-01-05 PROCEDURE — 36415 COLL VENOUS BLD VENIPUNCTURE: CPT

## 2023-01-05 PROCEDURE — 74011000250 HC RX REV CODE- 250: Performed by: GENERAL ACUTE CARE HOSPITAL

## 2023-01-05 PROCEDURE — 74011250636 HC RX REV CODE- 250/636: Performed by: GENERAL ACUTE CARE HOSPITAL

## 2023-01-05 PROCEDURE — 83735 ASSAY OF MAGNESIUM: CPT

## 2023-01-05 PROCEDURE — 85025 COMPLETE CBC W/AUTO DIFF WBC: CPT

## 2023-01-05 RX ORDER — MORPHINE SULFATE 2 MG/ML
2 INJECTION, SOLUTION INTRAMUSCULAR; INTRAVENOUS
Status: DISCONTINUED | OUTPATIENT
Start: 2023-01-05 | End: 2023-01-06

## 2023-01-05 RX ADMIN — PIPERACILLIN AND TAZOBACTAM 3.38 G: 3; .375 INJECTION, POWDER, FOR SOLUTION INTRAVENOUS at 18:01

## 2023-01-05 RX ADMIN — ONDANSETRON 4 MG: 2 INJECTION INTRAMUSCULAR; INTRAVENOUS at 09:29

## 2023-01-05 RX ADMIN — SODIUM CHLORIDE, PRESERVATIVE FREE 10 ML: 5 INJECTION INTRAVENOUS at 05:06

## 2023-01-05 RX ADMIN — DEXTROSE MONOHYDRATE 100 ML/HR: 50 INJECTION, SOLUTION INTRAVENOUS at 18:01

## 2023-01-05 RX ADMIN — SODIUM CHLORIDE, PRESERVATIVE FREE 10 ML: 5 INJECTION INTRAVENOUS at 14:16

## 2023-01-05 RX ADMIN — OSELTAMIVIR PHOSPHATE 30 MG: 30 CAPSULE ORAL at 18:01

## 2023-01-05 RX ADMIN — POTASSIUM BICARBONATE 40 MEQ: 782 TABLET, EFFERVESCENT ORAL at 10:46

## 2023-01-05 RX ADMIN — PIPERACILLIN AND TAZOBACTAM 3.38 G: 3; .375 INJECTION, POWDER, FOR SOLUTION INTRAVENOUS at 00:29

## 2023-01-05 RX ADMIN — MORPHINE SULFATE 2 MG: 2 INJECTION, SOLUTION INTRAMUSCULAR; INTRAVENOUS at 18:19

## 2023-01-05 RX ADMIN — MORPHINE SULFATE 2 MG: 2 INJECTION, SOLUTION INTRAMUSCULAR; INTRAVENOUS at 21:44

## 2023-01-05 RX ADMIN — ONDANSETRON 4 MG: 2 INJECTION INTRAMUSCULAR; INTRAVENOUS at 18:06

## 2023-01-05 RX ADMIN — OSELTAMIVIR PHOSPHATE 30 MG: 30 CAPSULE ORAL at 09:11

## 2023-01-05 RX ADMIN — DEXTROSE MONOHYDRATE 75 ML/HR: 50 INJECTION, SOLUTION INTRAVENOUS at 09:10

## 2023-01-05 RX ADMIN — MORPHINE SULFATE 2 MG: 2 INJECTION, SOLUTION INTRAMUSCULAR; INTRAVENOUS at 00:29

## 2023-01-05 RX ADMIN — SODIUM CHLORIDE, PRESERVATIVE FREE 10 ML: 5 INJECTION INTRAVENOUS at 21:44

## 2023-01-05 RX ADMIN — MORPHINE SULFATE 2 MG: 2 INJECTION, SOLUTION INTRAMUSCULAR; INTRAVENOUS at 09:29

## 2023-01-05 RX ADMIN — ENOXAPARIN SODIUM 40 MG: 100 INJECTION SUBCUTANEOUS at 09:11

## 2023-01-05 RX ADMIN — PIPERACILLIN AND TAZOBACTAM 3.38 G: 3; .375 INJECTION, POWDER, FOR SOLUTION INTRAVENOUS at 09:11

## 2023-01-05 NOTE — WOUND CARE
Wound care consult for the left side of the neck skin tear that was present on admission. Pt. Has a Trach with a strap around the neck. There is a dry gauze pad on the wound at this time but it is wet enough to see that the skin tear is full thickness. The wound bed is pink and moist and requires moist wound care. This was discussed with nursing who will do this wound care. Will need two people to assist. One to hold the trach in place and the other to do the wound care. Wound care orders; Hold the tracheostomy tube in place. Other nurse removes the strap and does the wound care. The current dressing will need to be removed with NS. Wipe with NS wet gauze. Apply a Xeroform gauze dressing double thick and then a few 4x4s and tape with paper tape. Date the dressing. This will need to be done daily.    Rebel Marrero RN, BSN, [facility]

## 2023-01-05 NOTE — ADVANCED PRACTICE NURSE
1300 D5 continuous infusion rate changed to 100 mL  1500 Feeding rate change to 40 mL  1800 Daily wound care done on L neck skin tear  PRN morphine given x 2  PRN zofran given x 2    Bedside and Verbal shift change report given to Amarilys Gamboa RN (oncoming nurse) by Danilo Luz RN (offgoing nurse). Report included the following information SBAR, Kardex, Cardiac Rhythm NSR, and Quality Measures.

## 2023-01-05 NOTE — PROGRESS NOTES
Comprehensive Nutrition Assessment    Type and Reason for Visit: Initial, Consult, Positive nutrition screen    Nutrition Recommendations/Plan:   If continuing TF, recommend Jevity 1.5 @ 50 mL/hr + 150 mL water flushes q 4 hours (provides 1800 kcals, 77g protein, 1812 mL water)     Nutrition Assessment:    Patient medically noted for respiratory failure and influenza. PMH head and neck cancer, trach, and PEG. Patient sleeping at time of visit. TF ordered per MD; Jevity 1.5 infusing at 30 mL/hr with goal rate of 40 mL/hr. Patient previously on hospice at a facility [facility]. Per MAR, patient was receiving Nutren 2.0 75 mL bolus 3x/day; unsure if this TF order is accurate as it's only providing 450 kcals/day. Unsure if patient was taking PO as well. Significant muscle and fat wasting noted on visual assessment. Likely to resume hospice on discharge. Recommendations provided above if patient wishes to continue TF. Malnutrition Assessment:  Malnutrition Status:  Severe malnutrition (01/05/23 1106)    Context:  Chronic illness     Findings of the 6 clinical characteristics of malnutrition:   Energy Intake:  75% or less est energy requirements for 1 month or longer  Weight Loss:  Unable to assess     Body Fat Loss:  Severe body fat loss, Triceps, Fat overlying ribs, Buccal region   Muscle Mass Loss:  Severe muscle mass loss, Temples (temporalis), Clavicles (pectoralis &deltoids), Calf (gastrocnemius)  Fluid Accumulation:  No significant fluid accumulation,     Strength:  Not performed     Nutrition Related Findings:    , K+ 3.3, Phos 3.4   Effer-K, D5% IVF  Wound Type:  Wound consult pending    Current Nutrition Intake & Therapies:  Average Meal Intake: NPO     DIET NPO Ice Chips, Sips of Water with Meds, Sips of Clear Liquids, Popsicles  ADULT TUBE FEEDING PEG; Standard with Fiber; Delivery Method: Continuous; Continuous Initial Rate (mL/hr): 10; Continuous Advance Tube Feeding: Yes; Advancement Volume (mL/hr): 10; Advancement Frequency: Q 8 hours; Continuous Goal Rate (mL/hr): 40. .. Anthropometric Measures:  Height: 6' 5\" (195.6 cm)  Ideal Body Weight (IBW): 208 lbs (95 kg)     Current Body Wt:  53.7 kg (118 lb 6.2 oz), 56.9 % IBW. Current BMI (kg/m2): 14                          BMI Category: Underweight (BMI less than 18.5)    Estimated Daily Nutrient Needs:  Energy Requirements Based On: Formula  Weight Used for Energy Requirements: Current  Energy (kcal/day): 1882 kcals (BMR 1447 x 1. 3AF)  Weight Used for Protein Requirements: Current  Protein (g/day): 74-97g (1.4-1.8 g/kg bw)  Method Used for Fluid Requirements: 1 ml/kcal  Fluid (ml/day): 1900 mL    Nutrition Diagnosis:   Inadequate protein-energy intake related to inadequate enteral nutrition infusion as evidenced by severe muscle loss, severe loss of subcutaneous fat    Nutrition Interventions:   Food and/or Nutrient Delivery: Modify tube feeding  Nutrition Education/Counseling: No recommendations at this time  Coordination of Nutrition Care: Continue to monitor while inpatient       Goals:     Goals:  Tolerate nutrition support at goal rate, by next RD assessment       Nutrition Monitoring and Evaluation:   Behavioral-Environmental Outcomes: None identified  Food/Nutrient Intake Outcomes: Enteral nutrition intake/tolerance  Physical Signs/Symptoms Outcomes: Biochemical data, Skin, Weight, Fluid status or edema    Discharge Planning:    Enteral nutrition    Rafael Ballard RD  Contact: ext [phone]

## 2023-01-05 NOTE — PROGRESS NOTES
Physician Progress Note      PATIENT:               Marion Alvarado  CSN #:                  [id number]  :                       1960  ADMIT DATE:       2023 6:41 AM  DISCH DATE:  RESPONDING  PROVIDER #:        Dorothy Nation MD        QUERY TEXT:    Type of Anemia: Please provide further specificity, if known. Clinical indicators include: anemia, hgb, hct  Options provided:  -- Anemia due to acute blood loss  -- Anemia due to chronic blood loss  -- Anemia due to iron deficiency  -- Anemia due to postoperative blood loss  -- Anemia due to chronic disease  -- Other - I will add my own diagnosis  -- Disagree - Not applicable / Not valid  -- Disagree - Clinically Unable to determine / Unknown        PROVIDER RESPONSE TEXT:    The patient has anemia due to chronic blood loss. QUERY TEXT:    Patient admitted with Trach dislodgement. Per RD consult noted dated , patient noted to have severe body fat and muscle mass loss. If possible, please document in progress notes and discharge summary if you are evaluating and /or treating any of the following: The medical record reflects the following:  Risk Factors: Hx: Oropharyngeal cancer . Freddie Patella Freddie Patella C/o Trach dislodgement  Clinical Indicators: RD noted patient meets the following ASPEN criteria:  Malnutrition Status:  Severe malnutrition (23 1106)  Context:  Chronic illness  Findings of the 6 clinical characteristics of malnutrition:  Energy Intake:  75% or less est energy requirements for 1 month or longer  Weight Loss:  Unable to assess  Body Fat Loss:  Severe body fat loss, Triceps, Fat overlying ribs, Buccal region  Muscle Mass Loss:  Severe muscle mass loss, Temples (temporalis), Clavicles (pectoralis &deltoids), Calf (gastrocnemius)  Fluid Accumulation:  No significant fluid accumulation,   Strength:  Not performed  . .........................................   Treatment: RD consult; Nutrition Recommendations/Plan: If continuing TF, recommend Jevity 1.5 @ 50 mL/hr + 150 mL water flushes q 4 hours (provides 1800 kcals, 77g protein, 1812 mL water)    Thank you,    Leah MAGANA    ASPEN Criteria:  https://aspenjournals. onlinelibrary. hair. com/doi/full/10.1177/3344761322076312  Options provided:  -- Protein calorie malnutrition severe  -- Underweight with BMI 14.04  -- Other - I will add my own diagnosis  -- Disagree - Not applicable / Not valid  -- Disagree - Clinically unable to determine / Unknown  -- Refer to Clinical Documentation Reviewer    PROVIDER RESPONSE TEXT:    This patient has severe protein calorie malnutrition.     Query created by: Jose Hernandez on 1/5/2023 2:35 PM      Electronically signed by:  Margo Louise MD 1/5/2023 3:52 PM

## 2023-01-05 NOTE — PROGRESS NOTES
Hospitalist Progress Note    NAME: Sharmila Lobo   :  1960   MRN:  [id number]       Assessment / Plan:  Acute on chronic hypoxic respiratory failure  Trach dislodgement  Influenza A infection   H/o Head and neck Ca  On Hospice care   HARVEY, Cr was 1.1 on 22  Hypernatremia/hyperchloremia/dehydration     Anemia  Hypokalemia, replete      Cont' empiric IV abx, tamiflu  Nebs  D5 gtt  Cont' TF  Wean O2 as tolerated. He required 30LHF on admission, now weaned to [address] he will be discharge back to hospice with Luis Barnacle. Left a message, awaiting a call back    Trach care     Code Status: DNR, was under hospice  Surrogate Decision Maker: Cynthia Me, [phone]. Hospice/[facility] [phone]  DVT Prophylaxis: lovenox   GI Prophylaxis: not indicated  Baseline: PEG/Trach under hospice care at [facility]     Subjective:     Chief Complaint / Reason for Physician Visit  Awake, nad. Discussed with RN events overnight. Review of Systems:  Symptom Y/N Comments  Symptom Y/N Comments   Fever/Chills    Chest Pain     Poor Appetite    Edema     Cough    Abdominal Pain     Sputum    Joint Pain     SOB/CAI    Pruritis/Rash     Nausea/vomit    Tolerating PT/OT     Diarrhea    Tolerating Diet     Constipation    Other       Could NOT obtain due to:      Objective:     VITALS:   Last 24hrs VS reviewed since prior progress note.  Most recent are:  Patient Vitals for the past 24 hrs:   Temp Pulse Resp BP SpO2   23 1120 97.4 °F (36.3 °C) 83 16 98/61 97 %   23 0753 98.3 °F (36.8 °C) 88 20 122/70 94 %   23 0434 -- -- -- -- 93 %   23 0319 98 °F (36.7 °C) 88 20 110/77 94 %   23 0059 98 °F (36.7 °C) 87 18 101/64 94 %   23 0025 -- 85 16 -- 93 %   23 0024 -- -- -- -- 93 %   23 -- -- -- -- 94 %   23 1921 98 °F (36.7 °C) 86 16  95 %   23 1757 -- -- -- -- 95 %   23 1721 98 °F (36.7 °C) 86 21  --   23 1619 97.8 °F (36.6 °C) 89 21 102/76 95 %   01/04/23 1419 -- -- -- -- 95 %       Intake/Output Summary (Last 24 hours) at 1/5/2023 1408  Last data filed at 1/5/2023 5502  Gross per 24 hour   Intake 200 ml   Output 600 ml   Net -400 ml        I had a face to face encounter and independently examined this patient on 1/5/2023, as outlined below:  PHYSICAL EXAM:  General: Alert, nad, cachetic  EENT:  EOMI. Anicteric sclerae. MMM  Resp:  CTA bilaterally, no wheezing or rales. No accessory muscle use  CV:  Regular  rhythm,  No edema  GI:  Soft, Non distended, Non tender. +PEG  Neurologic:  Alert and oriented X 3, nod to questions  Psych:   Not anxious nor agitated  Skin:  No rashes. No jaundice    Reviewed most current lab test results and cultures  YES  Reviewed most current radiology test results   YES  Review and summation of old records today    NO  Reviewed patient's current orders and MAR    YES  PMH/ reviewed - no change compared to H&P  ________________________________________________________________________  Care Plan discussed with:    Comments   Patient x    Family      RN x    Care Manager     Consultant                        Multidiciplinary team rounds were held today with , nursing, pharmacist and clinical coordinator. Patient's plan of care was discussed; medications were reviewed and discharge planning was addressed. ________________________________________________________________________  Total NON critical care TIME:  35   Minutes    Total CRITICAL CARE TIME Spent:   Minutes non procedure based      Comments   >50% of visit spent in counseling and coordination of care     ________________________________________________________________________  Pooja Bear MD     Procedures: see electronic medical records for all procedures/Xrays and details which were not copied into this note but were reviewed prior to creation of Plan. LABS:  I reviewed today's most current labs and imaging studies.   Pertinent labs include:  Recent Labs     01/05/23  0030 01/04/23  0700   WBC 9.5 16.6*   HGB 8.0* 8.8*   HCT 28.1* 31.8*   * 162     Recent Labs     01/05/23  0030 01/04/23  1845 01/04/23 0700   *  --  157*   K 3.3*  --  3.8   *  --  118*   CO2 34*  --  34*   *  --  118*   BUN 46*  --  48*   CREA 1.50*  --  1.49*   CA 8.6  --  9.1   MG 2.1 2.3  --    PHOS  --  3.4  --    ALB 2.3*  --  2.7*   TBILI 0.6  --  0.6   ALT 13  --  14       Signed: Callum Gonzalez MD

## 2023-01-05 NOTE — PROGRESS NOTES
Pt has wounds on Left Neck and Right elbow. Pt not able to explain what they are from. They have dressings on them. Wound Care consult put in as they seem to be severe.

## 2023-01-06 ENCOUNTER — APPOINTMENT (OUTPATIENT)
Dept: GENERAL RADIOLOGY | Age: 63
DRG: 143 | End: 2023-01-06
Attending: PHYSICIAN ASSISTANT
Payer: MEDICAID

## 2023-01-06 LAB
ANION GAP SERPL CALC-SCNC: 2 MMOL/L (ref 5–15)
BUN SERPL-MCNC: 29 MG/DL (ref 6–20)
BUN/CREAT SERPL: 22 (ref 12–20)
CALCIUM SERPL-MCNC: 8.1 MG/DL (ref 8.5–10.1)
CHLORIDE SERPL-SCNC: 105 MMOL/L (ref 97–108)
CO2 SERPL-SCNC: 35 MMOL/L (ref 21–32)
CREAT SERPL-MCNC: 1.33 MG/DL (ref 0.7–1.3)
GLUCOSE SERPL-MCNC: 154 MG/DL (ref 65–100)
POTASSIUM SERPL-SCNC: 3.4 MMOL/L (ref 3.5–5.1)
SODIUM SERPL-SCNC: 142 MMOL/L (ref 136–145)

## 2023-01-06 PROCEDURE — 36415 COLL VENOUS BLD VENIPUNCTURE: CPT

## 2023-01-06 PROCEDURE — 74011250637 HC RX REV CODE- 250/637: Performed by: GENERAL ACUTE CARE HOSPITAL

## 2023-01-06 PROCEDURE — 65270000046 HC RM TELEMETRY

## 2023-01-06 PROCEDURE — 80048 BASIC METABOLIC PNL TOTAL CA: CPT

## 2023-01-06 PROCEDURE — 74011000250 HC RX REV CODE- 250: Performed by: GENERAL ACUTE CARE HOSPITAL

## 2023-01-06 PROCEDURE — 71045 X-RAY EXAM CHEST 1 VIEW: CPT

## 2023-01-06 PROCEDURE — 74011000258 HC RX REV CODE- 258: Performed by: INTERNAL MEDICINE

## 2023-01-06 PROCEDURE — 74011250636 HC RX REV CODE- 250/636: Performed by: PHYSICIAN ASSISTANT

## 2023-01-06 PROCEDURE — 74011250636 HC RX REV CODE- 250/636: Performed by: GENERAL ACUTE CARE HOSPITAL

## 2023-01-06 PROCEDURE — 77010033678 HC OXYGEN DAILY

## 2023-01-06 PROCEDURE — 74011250636 HC RX REV CODE- 250/636: Performed by: INTERNAL MEDICINE

## 2023-01-06 PROCEDURE — 74011250637 HC RX REV CODE- 250/637: Performed by: INTERNAL MEDICINE

## 2023-01-06 RX ORDER — FUROSEMIDE 10 MG/ML
40 INJECTION INTRAMUSCULAR; INTRAVENOUS 2 TIMES DAILY
Status: COMPLETED | OUTPATIENT
Start: 2023-01-06 | End: 2023-01-06

## 2023-01-06 RX ORDER — MORPHINE SULFATE 2 MG/ML
2 INJECTION, SOLUTION INTRAMUSCULAR; INTRAVENOUS
Status: DISCONTINUED | OUTPATIENT
Start: 2023-01-06 | End: 2023-01-18 | Stop reason: HOSPADM

## 2023-01-06 RX ORDER — OXYCODONE HYDROCHLORIDE 5 MG/1
5 TABLET ORAL
Status: DISCONTINUED | OUTPATIENT
Start: 2023-01-06 | End: 2023-01-18 | Stop reason: HOSPADM

## 2023-01-06 RX ORDER — IPRATROPIUM BROMIDE AND ALBUTEROL SULFATE 2.5; .5 MG/3ML; MG/3ML
3 SOLUTION RESPIRATORY (INHALATION)
Status: DISCONTINUED | OUTPATIENT
Start: 2023-01-06 | End: 2023-01-18 | Stop reason: HOSPADM

## 2023-01-06 RX ADMIN — PIPERACILLIN AND TAZOBACTAM 3.38 G: 3; .375 INJECTION, POWDER, FOR SOLUTION INTRAVENOUS at 18:14

## 2023-01-06 RX ADMIN — ONDANSETRON 4 MG: 2 INJECTION INTRAMUSCULAR; INTRAVENOUS at 00:17

## 2023-01-06 RX ADMIN — SODIUM CHLORIDE, PRESERVATIVE FREE 10 ML: 5 INJECTION INTRAVENOUS at 21:24

## 2023-01-06 RX ADMIN — SODIUM CHLORIDE, PRESERVATIVE FREE 5 ML: 5 INJECTION INTRAVENOUS at 13:24

## 2023-01-06 RX ADMIN — FUROSEMIDE 40 MG: 10 INJECTION, SOLUTION INTRAMUSCULAR; INTRAVENOUS at 10:55

## 2023-01-06 RX ADMIN — OSELTAMIVIR PHOSPHATE 30 MG: 30 CAPSULE ORAL at 18:21

## 2023-01-06 RX ADMIN — SODIUM CHLORIDE 500 ML: 9 INJECTION, SOLUTION INTRAVENOUS at 23:02

## 2023-01-06 RX ADMIN — FUROSEMIDE 40 MG: 10 INJECTION, SOLUTION INTRAMUSCULAR; INTRAVENOUS at 18:14

## 2023-01-06 RX ADMIN — ONDANSETRON 4 MG: 2 INJECTION INTRAMUSCULAR; INTRAVENOUS at 11:00

## 2023-01-06 RX ADMIN — MORPHINE SULFATE 2 MG: 2 INJECTION, SOLUTION INTRAMUSCULAR; INTRAVENOUS at 00:17

## 2023-01-06 RX ADMIN — PIPERACILLIN AND TAZOBACTAM 3.38 G: 3; .375 INJECTION, POWDER, FOR SOLUTION INTRAVENOUS at 08:32

## 2023-01-06 RX ADMIN — SODIUM CHLORIDE 500 ML: 9 INJECTION, SOLUTION INTRAVENOUS at 21:20

## 2023-01-06 RX ADMIN — POTASSIUM BICARBONATE 40 MEQ: 391 TABLET, EFFERVESCENT ORAL at 13:24

## 2023-01-06 RX ADMIN — PIPERACILLIN AND TAZOBACTAM 3.38 G: 3; .375 INJECTION, POWDER, FOR SOLUTION INTRAVENOUS at 00:17

## 2023-01-06 RX ADMIN — MORPHINE SULFATE 2 MG: 2 INJECTION, SOLUTION INTRAMUSCULAR; INTRAVENOUS at 13:23

## 2023-01-06 RX ADMIN — MORPHINE SULFATE 2 MG: 2 INJECTION, SOLUTION INTRAMUSCULAR; INTRAVENOUS at 08:26

## 2023-01-06 RX ADMIN — MORPHINE SULFATE 2 MG: 2 INJECTION, SOLUTION INTRAMUSCULAR; INTRAVENOUS at 03:50

## 2023-01-06 RX ADMIN — OSELTAMIVIR PHOSPHATE 30 MG: 30 CAPSULE ORAL at 10:54

## 2023-01-06 NOTE — PROGRESS NOTES
Transition of Care Plan:    RUR: 14%    Disposition:Return to [facility]    Follow up appointments: To be done by facility    DME needed:None    Transportation at Discharge: [facility]    Keys or means to access home:  N/A--Patient returning to [facility]. IM Medicare Letter:N/A--Patient has [place] Medicaid    Is patient a  and connected with the [facility]? No                If yes, was [employer] transfer form completed and [facility] notified? N/A    Caregiver Contact:Friend    Discharge Caregiver contacted prior to discharge? To be contacted    Care Conference needed?:   No        Reason for Admission:  Patient came to ed from [facility] due to trach being dislodged. RUR Score: 14%                    Plan for utilizing home health:  No        PCP: First and Last name:  Jaxon Jefferson MD     Name of Practice: [facility]     Are you a current patient: Yes/No: Yes     Approximate date of last visit: 1/4/23     Can you participate in a virtual visit with your PCP: No                    Current Advanced Directive/Advance Care Plan: DNR    Advance Care Planning     General Advance Care Planning (ACP) Conversation      Date of Conversation: 1/6/23  Conducted with: Patient with [address]:   No healthcare decision makers have been documented.    Click here to complete [address] including selection of the Healthcare Decision Maker Relationship (ie \"Primary\")      Content/Action Overview:   DECLINED ACP conversation - will revisit periodically   Reviewed DNR/DNI and patient confirms current DNR status - completed forms on file (place new order if needed)         Length of Voluntary ACP Conversation in minutes:  <16 minutes (Non-Billable)    Pepper Hagan, RN               Healthcare Decision Maker:   Click here to complete [address] including [facility] Relationship (ie \"Primary\")                             Patient plans to return to [address] [place] [address] with hospice care from [facility]. Patient has been at [facility] since last month. He has trach and peg. He has friends who come and see him. Jeri Lindsey RN BSN CRM        [phone]

## 2023-01-06 NOTE — PROGRESS NOTES
Hospitalist Progress Note    NAME: Bud Baker   :  1960   MRN:  [id number]       Assessment / Plan:  Acute on chronic hypoxic respiratory failure  Trach dislodgement  Influenza A infection   H/o Head and neck Ca  On Hospice care   HARVEY, Cr was 1.1 on 22  Hypernatremia/hyperchloremia/dehydration     Anemia  Hypokalemia, replete      Cont' empiric IV abx, tamiflu  Nebs  Stop D5gtt  Adding IV lasix x2 doses given probnp 31K. Repeat probnp tomorrow  Cont' TF  Wean O2 as tolerated. He required 30LHF on admission, now weaned to 10LHF  He will be discharge back to hospice with Mir De. I spoke to the pt today in regards to [address], he wants to contine treatment for now but wants to discharge back to hospice when he is medically stable. Cont' Trach care     Code Status: DNR, was under hospice  Surrogate Decision Maker: Suzette Potts, [phone]. Hospice/[facility] [phone]  DVT Prophylaxis: lovenox   GI Prophylaxis: not indicated  Baseline: PEG/Trach under hospice care at [facility]     Subjective:     Chief Complaint / Reason for Physician Visit  Awake, nad. Communicate with pt via writing  Discussed with RN events overnight. Review of Systems:  Symptom Y/N Comments  Symptom Y/N Comments   Fever/Chills    Chest Pain     Poor Appetite    Edema     Cough    Abdominal Pain     Sputum    Joint Pain     SOB/CAI    Pruritis/Rash     Nausea/vomit    Tolerating PT/OT     Diarrhea    Tolerating Diet     Constipation    Other       Could NOT obtain due to:      Objective:     VITALS:   Last 24hrs VS reviewed since prior progress note.  Most recent are:  Patient Vitals for the past 24 hrs:   Temp Pulse Resp BP SpO2   23 0829 98.5 °F (36.9 °C) 87 16 90/62 --   23 0352 97.6 °F (36.4 °C) 87 21 98/65 --   23 0342 -- -- -- -- 98 %   23 0021 98.1 °F (36.7 °C) 91 21 97/67 95 %   23 97.6 °F (36.4 °C) 88 19 111/66 94 %   23 -- -- -- -- 94 %   23 1513 97.6 °F (36.4 °C) 81 18 -- 96 %   01/05/23 1448 -- -- -- -- 95 %   01/05/23 1120 97.4 °F (36.3 °C) 83 16 98/61 97 %       No intake or output data in the 24 hours ending 01/06/23 1103       I had a face to face encounter and independently examined this patient on 1/6/2023, as outlined below:  PHYSICAL EXAM:  General: Alert, nad, cachetic  EENT:  EOMI. Anicteric sclerae. MMM  Resp:  Coarse,  no wheezing or rales. No accessory muscle use  CV:  Regular  rhythm,  No edema  GI:  Soft, Non distended, Non tender. +PEG  Neurologic:  Alert and oriented X 3, nod to questions  Psych:   Not anxious nor agitated  Skin:  No rashes. No jaundice    Reviewed most current lab test results and cultures  YES  Reviewed most current radiology test results   YES  Review and summation of old records today    NO  Reviewed patient's current orders and MAR    YES  PMH/ reviewed - no change compared to H&P  ________________________________________________________________________  Care Plan discussed with:    Comments   Patient x    Family      RN x    Care Manager     Consultant                        Multidiciplinary team rounds were held today with , nursing, pharmacist and clinical coordinator. Patient's plan of care was discussed; medications were reviewed and discharge planning was addressed. ________________________________________________________________________  Total NON critical care TIME:  35   Minutes    Total CRITICAL CARE TIME Spent:   Minutes non procedure based      Comments   >50% of visit spent in counseling and coordination of care     ________________________________________________________________________  Chio Sands MD     Procedures: see electronic medical records for all procedures/Xrays and details which were not copied into this note but were reviewed prior to creation of Plan. LABS:  I reviewed today's most current labs and imaging studies.   Pertinent labs include:  Recent Labs     01/05/23  0030 01/04/23  0700   WBC 9.5 16.6*   HGB 8.0* 8.8*   HCT 28.1* 31.8*   * 162       Recent Labs     01/06/23  0406 01/05/23  0030 01/04/23  1845 01/04/23  0700    152*  --  157*   K 3.4* 3.3*  --  3.8    116*  --  118*   CO2 35* 34*  --  34*   * 160*  --  118*   BUN 29* 46*  --  48*   CREA 1.33* 1.50*  --  1.49*   CA 8.1* 8.6  --  9.1   MG  --  2.1 2.3  --    PHOS  --   --  3.4  --    ALB  --  2.3*  --  2.7*   TBILI  --  0.6  --  0.6   ALT  --  13  --  14         Signed: Adonis Peoples MD

## 2023-01-07 LAB
ANION GAP SERPL CALC-SCNC: 3 MMOL/L (ref 5–15)
BASOPHILS # BLD: 0 K/UL (ref 0–0.1)
BASOPHILS NFR BLD: 0 % (ref 0–1)
BNP SERPL-MCNC: ABNORMAL PG/ML
BUN SERPL-MCNC: 26 MG/DL (ref 6–20)
BUN/CREAT SERPL: 20 (ref 12–20)
CALCIUM SERPL-MCNC: 7.8 MG/DL (ref 8.5–10.1)
CHLORIDE SERPL-SCNC: 103 MMOL/L (ref 97–108)
CO2 SERPL-SCNC: 37 MMOL/L (ref 21–32)
CREAT SERPL-MCNC: 1.27 MG/DL (ref 0.7–1.3)
DIFFERENTIAL METHOD BLD: ABNORMAL
EOSINOPHIL # BLD: 0 K/UL (ref 0–0.4)
EOSINOPHIL NFR BLD: 0 % (ref 0–7)
ERYTHROCYTE [DISTWIDTH] IN BLOOD BY AUTOMATED COUNT: 16.6 % (ref 11.5–14.5)
GLUCOSE BLD STRIP.AUTO-MCNC: 115 MG/DL (ref 65–117)
GLUCOSE SERPL-MCNC: 96 MG/DL (ref 65–100)
HCT VFR BLD AUTO: 27.7 % (ref 36.6–50.3)
HGB BLD-MCNC: 8.2 G/DL (ref 12.1–17)
IMM GRANULOCYTES # BLD AUTO: 0.1 K/UL (ref 0–0.04)
IMM GRANULOCYTES NFR BLD AUTO: 1 % (ref 0–0.5)
LACTATE SERPL-SCNC: 0.9 MMOL/L (ref 0.4–2)
LYMPHOCYTES # BLD: 0.4 K/UL (ref 0.8–3.5)
LYMPHOCYTES NFR BLD: 3 % (ref 12–49)
MCH RBC QN AUTO: 26.5 PG (ref 26–34)
MCHC RBC AUTO-ENTMCNC: 29.6 G/DL (ref 30–36.5)
MCV RBC AUTO: 89.4 FL (ref 80–99)
MONOCYTES # BLD: 0.4 K/UL (ref 0–1)
MONOCYTES NFR BLD: 3 % (ref 5–13)
NEUTS SEG # BLD: 12.2 K/UL (ref 1.8–8)
NEUTS SEG NFR BLD: 93 % (ref 32–75)
NRBC # BLD: 0 K/UL (ref 0–0.01)
NRBC BLD-RTO: 0 PER 100 WBC
PLATELET # BLD AUTO: 125 K/UL (ref 150–400)
PMV BLD AUTO: 12.7 FL (ref 8.9–12.9)
POTASSIUM SERPL-SCNC: 2.9 MMOL/L (ref 3.5–5.1)
RBC # BLD AUTO: 3.1 M/UL (ref 4.1–5.7)
SERVICE CMNT-IMP: NORMAL
SODIUM SERPL-SCNC: 143 MMOL/L (ref 136–145)
WBC # BLD AUTO: 13.1 K/UL (ref 4.1–11.1)

## 2023-01-07 PROCEDURE — 36415 COLL VENOUS BLD VENIPUNCTURE: CPT

## 2023-01-07 PROCEDURE — 80048 BASIC METABOLIC PNL TOTAL CA: CPT

## 2023-01-07 PROCEDURE — P9047 ALBUMIN (HUMAN), 25%, 50ML: HCPCS | Performed by: INTERNAL MEDICINE

## 2023-01-07 PROCEDURE — 74011000258 HC RX REV CODE- 258: Performed by: INTERNAL MEDICINE

## 2023-01-07 PROCEDURE — 82962 GLUCOSE BLOOD TEST: CPT

## 2023-01-07 PROCEDURE — 74011250636 HC RX REV CODE- 250/636: Performed by: INTERNAL MEDICINE

## 2023-01-07 PROCEDURE — 74011250636 HC RX REV CODE- 250/636: Performed by: STUDENT IN AN ORGANIZED HEALTH CARE EDUCATION/TRAINING PROGRAM

## 2023-01-07 PROCEDURE — 83880 ASSAY OF NATRIURETIC PEPTIDE: CPT

## 2023-01-07 PROCEDURE — 74011000258 HC RX REV CODE- 258: Performed by: PHYSICIAN ASSISTANT

## 2023-01-07 PROCEDURE — 85025 COMPLETE CBC W/AUTO DIFF WBC: CPT

## 2023-01-07 PROCEDURE — 74011000250 HC RX REV CODE- 250: Performed by: GENERAL ACUTE CARE HOSPITAL

## 2023-01-07 PROCEDURE — 74011250636 HC RX REV CODE- 250/636: Performed by: PHYSICIAN ASSISTANT

## 2023-01-07 PROCEDURE — 83605 ASSAY OF LACTIC ACID: CPT

## 2023-01-07 PROCEDURE — P9047 ALBUMIN (HUMAN), 25%, 50ML: HCPCS | Performed by: STUDENT IN AN ORGANIZED HEALTH CARE EDUCATION/TRAINING PROGRAM

## 2023-01-07 PROCEDURE — 74011250637 HC RX REV CODE- 250/637: Performed by: PHYSICIAN ASSISTANT

## 2023-01-07 PROCEDURE — 74011250637 HC RX REV CODE- 250/637: Performed by: GENERAL ACUTE CARE HOSPITAL

## 2023-01-07 PROCEDURE — 65270000046 HC RM TELEMETRY

## 2023-01-07 PROCEDURE — 74011000250 HC RX REV CODE- 250: Performed by: INTERNAL MEDICINE

## 2023-01-07 PROCEDURE — 74011250637 HC RX REV CODE- 250/637: Performed by: INTERNAL MEDICINE

## 2023-01-07 PROCEDURE — 74011250636 HC RX REV CODE- 250/636: Performed by: GENERAL ACUTE CARE HOSPITAL

## 2023-01-07 PROCEDURE — 74011000250 HC RX REV CODE- 250: Performed by: PHYSICIAN ASSISTANT

## 2023-01-07 PROCEDURE — 77010033711 HC HIGH FLOW OXYGEN

## 2023-01-07 RX ORDER — POTASSIUM CHLORIDE 7.45 MG/ML
10 INJECTION INTRAVENOUS
Status: DISPENSED | OUTPATIENT
Start: 2023-01-07 | End: 2023-01-07

## 2023-01-07 RX ORDER — ALBUMIN HUMAN 250 G/1000ML
25 SOLUTION INTRAVENOUS ONCE
Status: COMPLETED | OUTPATIENT
Start: 2023-01-07 | End: 2023-01-07

## 2023-01-07 RX ORDER — HYDROCORTISONE SODIUM SUCCINATE 100 MG/2ML
50 INJECTION, POWDER, FOR SOLUTION INTRAMUSCULAR; INTRAVENOUS ONCE
Status: COMPLETED | OUTPATIENT
Start: 2023-01-07 | End: 2023-01-07

## 2023-01-07 RX ORDER — MIDODRINE HYDROCHLORIDE 5 MG/1
10 TABLET ORAL 3 TIMES DAILY
Status: DISCONTINUED | OUTPATIENT
Start: 2023-01-07 | End: 2023-01-18 | Stop reason: HOSPADM

## 2023-01-07 RX ORDER — DEXTROSE MONOHYDRATE AND SODIUM CHLORIDE 5; .45 G/100ML; G/100ML
500 INJECTION, SOLUTION INTRAVENOUS ONCE
Status: COMPLETED | OUTPATIENT
Start: 2023-01-07 | End: 2023-01-07

## 2023-01-07 RX ORDER — ALBUMIN HUMAN 250 G/1000ML
50 SOLUTION INTRAVENOUS EVERY 6 HOURS
Status: COMPLETED | OUTPATIENT
Start: 2023-01-07 | End: 2023-01-08

## 2023-01-07 RX ORDER — ALBUMIN HUMAN 250 G/1000ML
25 SOLUTION INTRAVENOUS ONCE
Status: DISPENSED | OUTPATIENT
Start: 2023-01-07 | End: 2023-01-08

## 2023-01-07 RX ORDER — FUROSEMIDE 10 MG/ML
40 INJECTION INTRAMUSCULAR; INTRAVENOUS ONCE
Status: COMPLETED | OUTPATIENT
Start: 2023-01-07 | End: 2023-01-07

## 2023-01-07 RX ORDER — MIDODRINE HYDROCHLORIDE 5 MG/1
5 TABLET ORAL
Status: DISCONTINUED | OUTPATIENT
Start: 2023-01-07 | End: 2023-01-18 | Stop reason: HOSPADM

## 2023-01-07 RX ORDER — NOREPINEPHRINE BITARTRATE/D5W 8 MG/250ML
.5-16 PLASTIC BAG, INJECTION (ML) INTRAVENOUS
Status: DISCONTINUED | OUTPATIENT
Start: 2023-01-07 | End: 2023-01-10

## 2023-01-07 RX ADMIN — SODIUM CHLORIDE 500 ML: 9 INJECTION, SOLUTION INTRAVENOUS at 00:24

## 2023-01-07 RX ADMIN — ACETAMINOPHEN 650 MG: 325 TABLET ORAL at 00:24

## 2023-01-07 RX ADMIN — POTASSIUM CHLORIDE 10 MEQ: 7.46 INJECTION, SOLUTION INTRAVENOUS at 04:58

## 2023-01-07 RX ADMIN — POTASSIUM CHLORIDE 10 MEQ: 7.46 INJECTION, SOLUTION INTRAVENOUS at 09:45

## 2023-01-07 RX ADMIN — SODIUM CHLORIDE, PRESERVATIVE FREE 10 ML: 5 INJECTION INTRAVENOUS at 06:18

## 2023-01-07 RX ADMIN — SODIUM CHLORIDE, PRESERVATIVE FREE 10 ML: 5 INJECTION INTRAVENOUS at 16:01

## 2023-01-07 RX ADMIN — NOREPINEPHRINE BITARTRATE 5 MCG/MIN: 1 SOLUTION INTRAVENOUS at 11:15

## 2023-01-07 RX ADMIN — ALBUMIN (HUMAN) 25 G: 0.25 INJECTION, SOLUTION INTRAVENOUS at 10:43

## 2023-01-07 RX ADMIN — NOREPINEPHRINE BITARTRATE 4 MCG/MIN: 1 SOLUTION INTRAVENOUS at 04:11

## 2023-01-07 RX ADMIN — MIDODRINE HYDROCHLORIDE 10 MG: 5 TABLET ORAL at 16:01

## 2023-01-07 RX ADMIN — FUROSEMIDE 40 MG: 10 INJECTION, SOLUTION INTRAMUSCULAR; INTRAVENOUS at 12:37

## 2023-01-07 RX ADMIN — ONDANSETRON 4 MG: 2 INJECTION INTRAMUSCULAR; INTRAVENOUS at 03:04

## 2023-01-07 RX ADMIN — MIDODRINE HYDROCHLORIDE 10 MG: 5 TABLET ORAL at 12:19

## 2023-01-07 RX ADMIN — MIDODRINE HYDROCHLORIDE 10 MG: 5 TABLET ORAL at 21:41

## 2023-01-07 RX ADMIN — POTASSIUM CHLORIDE 10 MEQ: 7.46 INJECTION, SOLUTION INTRAVENOUS at 06:16

## 2023-01-07 RX ADMIN — OSELTAMIVIR PHOSPHATE 30 MG: 30 CAPSULE ORAL at 17:58

## 2023-01-07 RX ADMIN — OSELTAMIVIR PHOSPHATE 30 MG: 30 CAPSULE ORAL at 09:46

## 2023-01-07 RX ADMIN — PIPERACILLIN AND TAZOBACTAM 3.38 G: 3; .375 INJECTION, POWDER, FOR SOLUTION INTRAVENOUS at 19:41

## 2023-01-07 RX ADMIN — SODIUM CHLORIDE, PRESERVATIVE FREE 10 ML: 5 INJECTION INTRAVENOUS at 21:42

## 2023-01-07 RX ADMIN — ENOXAPARIN SODIUM 40 MG: 100 INJECTION SUBCUTANEOUS at 09:45

## 2023-01-07 RX ADMIN — POTASSIUM CHLORIDE 10 MEQ: 7.46 INJECTION, SOLUTION INTRAVENOUS at 11:00

## 2023-01-07 RX ADMIN — MIDODRINE HYDROCHLORIDE 5 MG: 5 TABLET ORAL at 00:24

## 2023-01-07 RX ADMIN — PIPERACILLIN AND TAZOBACTAM 3.38 G: 3; .375 INJECTION, POWDER, FOR SOLUTION INTRAVENOUS at 12:22

## 2023-01-07 RX ADMIN — ONDANSETRON 4 MG: 2 INJECTION INTRAMUSCULAR; INTRAVENOUS at 18:59

## 2023-01-07 RX ADMIN — ONDANSETRON 4 MG: 2 INJECTION INTRAMUSCULAR; INTRAVENOUS at 10:50

## 2023-01-07 RX ADMIN — DEXTROSE AND SODIUM CHLORIDE 500 ML/HR: 5; 450 INJECTION, SOLUTION INTRAVENOUS at 18:44

## 2023-01-07 RX ADMIN — HYDROCORTISONE SODIUM SUCCINATE 50 MG: 100 INJECTION, POWDER, FOR SOLUTION INTRAMUSCULAR; INTRAVENOUS at 18:44

## 2023-01-07 RX ADMIN — PIPERACILLIN AND TAZOBACTAM 3.38 G: 3; .375 INJECTION, POWDER, FOR SOLUTION INTRAVENOUS at 00:24

## 2023-01-07 RX ADMIN — ALBUMIN (HUMAN) 50 G: 0.25 INJECTION, SOLUTION INTRAVENOUS at 18:48

## 2023-01-07 RX ADMIN — NOREPINEPHRINE BITARTRATE 6 MCG/MIN: 1 SOLUTION INTRAVENOUS at 11:05

## 2023-01-07 NOTE — PROGRESS NOTES
Problem: Falls - Risk of  Goal: *Absence of Falls  Description: Document Bertrum Mast Fall Risk and appropriate interventions in the flowsheet.   Outcome: Progressing Towards Goal  Note: Fall Risk Interventions:            Medication Interventions: Assess postural VS orthostatic hypotension, Bed/chair exit alarm    Elimination Interventions: Bed/chair exit alarm, Call light in reach    History of Falls Interventions: Bed/chair exit alarm

## 2023-01-07 NOTE — PROGRESS NOTES
1900: Bedside shift change report given to Jess Glaser RN (oncoming nurse) by Leticia Javier RN (offgoing nurse). Report included the following information SBAR, Kardex, Intake/Output, MAR, Recent Results, and Cardiac Rhythm NSR .      1928: Message sent to [facility] about hypotension. BP is 79/56 (61) and patient is asymptomatic.       2125: Bolus started. BP 91/55 (64). [facility] at bedside to assess patient. Patient asking for morphine, provider advised to hold off until BP stabilizes. 2240: Second bolus ordered and started due to continued hypotension. Patient still asymptomatic. Patient educated on why pain medication cannot be given due to hypotension. 2346: RN rounded on patient and found tracheostomy was out. RN quickly called RT to come to bedside as well as got another RN to help. New trach and inner cannula already at bedside and placed back in patient with RT at bedside to assist. Patient's SPO2 dropped to 80% but quickly recovered once trach was restored and connected to heated hiflow. RN stayed at bedside with patient to assess and observe. 0020Erik Leaks, PA rounding on patient. BP still low 81/51 (58). Third 500 ml bolus ordered as well as 5mg of midodrine. PA was advised of tracheostomy being dislodged. 0024: PRN midodrine given as well as PRN tylenol for pain via PEG tube. 0100:  BP improving 102/65 (71).     0247: Patient's BP 84/57 (63). RN sent message to [place], [place]. PA spoke to RN in person, will order Levophed. 9884: Levophed started. RN had to call pharmacy multiple times in order to receive medication. Finally received at [address]: RN notified [facility] patient's potassium is 2.9 on this morning's labs. New order received for potassium IV.     0615: Titrated Levophed to 5 mcg, BP 87/57 (64). End of Shift Note    Bedside shift change report given to Federico Medrano RN (oncoming nurse) by Dusty Brooks RN (offgoing nurse).   Report included the following information SBAR, Kardex, Intake/Output, MAR, Recent Results, and Cardiac Rhythm NSR    Shift worked:  [id number]     Shift summary and any significant changes:     See above     Concerns for physician to address:  Persistent hypotension. Restarting tube feeds? Increased O2 demands      Zone phone for oncoming shift:          Activity:  Activity Level: Bed Rest  Number times ambulated in hallways past shift: 0  Number of times OOB to chair past shift: 0    Cardiac:   Cardiac Monitoring: Yes      Cardiac Rhythm: Sinus Rhythm    Access:  Current line(s): central line     Genitourinary:   Urinary status: voiding    Respiratory:   O2 Device: Heated, Hi flow nasal cannula  Chronic home O2 use?: YES  Incentive spirometer at bedside: NO       GI:  Last Bowel Movement Date: 01/06/23  Current diet:  DIET NPO Ice Chips, Sips of Water with Meds, Sips of Clear Liquids, Popsicles  ADULT TUBE FEEDING PEG; Standard with Fiber; Delivery Method: Continuous; Continuous Initial Rate (mL/hr): 30; Continuous Advance Tube Feeding: Yes; Advancement Volume (mL/hr): 10; Advancement Frequency: Q 8 hours; Continuous Goal Rate (mL/hr): 50. .. Passing flatus: YES  Tolerating current diet: NO       Pain Management:   Patient states pain is manageable on current regimen: YES    Skin:  Edward Score: 15  Interventions: turn team, speciality bed, float heels, increase time out of bed, foam dressing, PT/OT consult, limit briefs, internal/external urinary devices, and nutritional support     Patient Safety:  Fall Score:  Total Score: 3  Interventions: bed/chair alarm, assistive device (walker, cane, etc), gripper socks, and pt to call before getting OOB  High Fall Risk: Yes    Length of Stay:  Expected LOS: 4d 9h  Actual LOS: 3      Christian Munson RN

## 2023-01-07 NOTE — PROGRESS NOTES
Hospitalist Progress Note    NAME: Brenda Stephens   :  1960   MRN:  [id number]       Assessment / Plan:  Acute on chronic hypoxic respiratory failure  Trach dislodgement  Influenza A infection   H/o Head and neck Ca  On Hospice care   HARVEY, Cr was 1.1 on 22  Hypernatremia/hyperchloremia/dehydration     Anemia  Hypokalemia, replete with KCl     Cont' empiric IV abx, tamiflu  Nebs  Received fluid bolus but did not responded therefore he was started on levophed. Reportedly was asymptomatic, conversing well this AM despite low bp. Will add midodrine tid, wean off pressor support. Goal map>=65  Cont' TF  Wean O2 as tolerated. He required 30LHF on admission, now on 25LHF     Code Status: DNR, was under hospice  Surrogate Decision Maker: Alisha Landaverde, [phone]. Hospice/[facility] [phone]  DVT Prophylaxis: lovenox   GI Prophylaxis: not indicated  Baseline: PEG/Trach under hosp[facility] care at [facility]     Subjective:     Chief Complaint / Reason for Physician Visit  Awake, nad. Overnight event noted. Now on pressor support. Discussed with RN events overnight. Review of Systems:  Symptom Y/N Comments  Symptom Y/N Comments   Fever/Chills    Chest Pain     Poor Appetite    Edema     Cough    Abdominal Pain     Sputum    Joint Pain     SOB/CAI    Pruritis/Rash     Nausea/vomit    Tolerating PT/OT     Diarrhea    Tolerating Diet     Constipation    Other       Could NOT obtain due to:      Objective:     VITALS:   Last 24hrs VS reviewed since prior progress note.  Most recent are:  Patient Vitals for the past 24 hrs:   Temp Pulse Resp BP SpO2   23 1237 -- 88 -- 90/68 --   23 1230 99.6 °F (37.6 °C) 90 18 94/61 96 %   23 1225 -- 91 19 (!) 80/62 95 %   23 1219 -- 91 -- (!) 76/56 --   23 1110 -- 95 20 139/80 98 %   23 1100 -- 95 20 (!) 69/48 90 %   23 1050 -- 97 18 (!) 89/56 91 %   23 1040 -- 91 17 (!) 86/59 91 %   23 1030 -- 89 17 (!) 84/55 91 % 01/07/23 1020 -- 89 18 94/60 93 %   01/07/23 1010 -- 88 16 (!) 86/63 92 %   01/07/23 1000 -- 87 20 (!) 75/56 (!) 89 %   01/07/23 0800 98.3 °F (36.8 °C) 82 16 107/69 --   01/07/23 0745 -- 84 16 113/74 --   01/07/23 0715 -- 82 15 (!) 83/62 (!) 85 %   01/07/23 0700 -- 86 16 102/67 --   01/07/23 0615 -- 86 13 (!) 87/57 --   01/07/23 0545 -- 82 15 (!) 97/58 (!) 87 %   01/07/23 0530 -- 84 15 93/61 --   01/07/23 0515 -- 84 15 110/81 90 %   01/07/23 0500 -- 80 17 95/61 90 %   01/07/23 0445 -- 80 15 (!) 86/57 91 %   01/07/23 0427 -- 77 17 (!) 105/59 (!) 85 %   01/07/23 0421 -- 83 17 107/64 (!) 82 %   01/07/23 0416 -- 80 16 (!) 78/60 (!) 86 %   01/07/23 0409 -- -- -- -- 93 %   01/07/23 0400 -- 81 12 (!) 76/47 --   01/07/23 0342 -- 81 14 (!) 82/49 --   01/07/23 0330 -- 85 15 (!) 85/56 (!) 82 %   01/07/23 0300 98.6 °F (37 °C) 84 15 (!) 84/57 90 %   01/07/23 0245 -- 83 14 (!) 84/52 --   01/07/23 0241 -- 82 14 (!) 86/59 92 %   01/07/23 0132 -- 81 13 94/60 --   01/07/23 0130 -- 84 14 (!) 80/61 --   01/07/23 0123 -- 81 21 96/64 --   01/07/23 0100 -- 79 16 102/65 --   01/07/23 0039 -- 80 14 (!) 93/59 --   01/06/23 2356 -- 85 17 (!) 81/51 --   01/06/23 2346 -- -- -- -- 91 %   01/06/23 2239 99.1 °F (37.3 °C) 88 17 (!) 87/58 (!) 84 %   01/06/23 2230 -- 94 18 90/71 --   01/06/23 2130 -- 86 15 (!) 82/56 92 %   01/06/23 2126 -- 86 28 (!) 91/55 (!) 87 %   01/06/23 2055 -- -- -- -- 92 %   01/06/23 2033 -- 87 20 (!) 84/53 --   01/06/23 2030 -- 89 14 (!) 77/59 93 %   01/06/23 1930 98.6 °F (37 °C) 85 14 (!) 106/59 91 %   01/06/23 1916 -- 85 18 (!) 79/56 94 %   01/06/23 1915 -- 85 16 (!) 84/54 --   01/06/23 1615 98.2 °F (36.8 °C) 86 17 (!) 92/57 (!) 89 %         Intake/Output Summary (Last 24 hours) at 1/7/2023 1340  Last data filed at 1/7/2023 0300  Gross per 24 hour   Intake 0 ml   Output 850 ml   Net -850 ml          I had a face to face encounter and independently examined this patient on 1/7/2023, as outlined below:  PHYSICAL EXAM:  General: Alert, nad, cachetic  EENT:  EOMI. Anicteric sclerae. MMM  Resp:  Coarse,  no wheezing or rales. No accessory muscle use  CV:  Regular  rhythm,  No edema  GI:  Soft, Non distended, Non tender. +PEG  Neurologic:  Alert and oriented X 3, nod to questions  Psych:   Not anxious nor agitated  Skin:  No rashes. No jaundice    Reviewed most current lab test results and cultures  YES  Reviewed most current radiology test results   YES  Review and summation of old records today    NO  Reviewed patient's current orders and MAR    YES  PMH/SH reviewed - no change compared to H&P  ________________________________________________________________________  Care Plan discussed with:    Comments   Patient x    Family      RN x    Care Manager     Consultant                        Multidiciplinary team rounds were held today with , nursing, pharmacist and clinical coordinator. Patient's plan of care was discussed; medications were reviewed and discharge planning was addressed. ________________________________________________________________________  Total NON critical care TIME:  35   Minutes    Total CRITICAL CARE TIME Spent:   Minutes non procedure based      Comments   >50% of visit spent in counseling and coordination of care     ________________________________________________________________________  Jessie Kapadia MD     Procedures: see electronic medical records for all procedures/Xrays and details which were not copied into this note but were reviewed prior to creation of Plan. LABS:  I reviewed today's most current labs and imaging studies.   Pertinent labs include:  Recent Labs     01/07/23  0256 01/05/23  0030   WBC 13.1* 9.5   HGB 8.2* 8.0*   HCT 27.7* 28.1*   * 124*       Recent Labs     01/07/23  0256 01/06/23  0406 01/05/23  0030 01/04/23  1845    142 152*  --    K 2.9* 3.4* 3.3*  --     105 116*  --    CO2 37* 35* 34*  --    GLU 96 154* 160*  --    BUN 26* 29* 46*  --    CREA 1.27 1.33* 1.50*  --    CA 7.8* 8.1* 8.6  --    MG  --   --  2.1 2.3   PHOS  --   --   --  3.4   ALB  --   --  2.3*  --    TBILI  --   --  0.6  --    ALT  --   --  13  --          Signed: Elijah Runner, MD

## 2023-01-07 NOTE — PROGRESS NOTES
Cross coverage note:    Called urgently by the nurse for ongoing hypotension. Patient admitted on 1/4/2021 with acute on chronic hypoxic respiratory failure with trach dislodgment. And influenza A. Patient was given aggressive IV hydration without any resolution of his hypotension. Started on Midodrine with improvement. We will continue to monitor closely. Hemoglobin has been trending down and will repeat CBC to ensure patient does not need to be transfused for acute blood loss anemia.   Subsequently added Levophed as patient's blood pressure remained suboptimal with MAP less than 65

## 2023-01-08 ENCOUNTER — APPOINTMENT (OUTPATIENT)
Dept: GENERAL RADIOLOGY | Age: 63
DRG: 143 | End: 2023-01-08
Attending: INTERNAL MEDICINE
Payer: MEDICAID

## 2023-01-08 LAB
ANION GAP SERPL CALC-SCNC: 6 MMOL/L (ref 5–15)
BUN SERPL-MCNC: 26 MG/DL (ref 6–20)
BUN/CREAT SERPL: 20 (ref 12–20)
CALCIUM SERPL-MCNC: 7.9 MG/DL (ref 8.5–10.1)
CHLORIDE SERPL-SCNC: 101 MMOL/L (ref 97–108)
CO2 SERPL-SCNC: 35 MMOL/L (ref 21–32)
CREAT SERPL-MCNC: 1.3 MG/DL (ref 0.7–1.3)
ERYTHROCYTE [DISTWIDTH] IN BLOOD BY AUTOMATED COUNT: 17.3 % (ref 11.5–14.5)
GLUCOSE SERPL-MCNC: 156 MG/DL (ref 65–100)
HCT VFR BLD AUTO: 30.6 % (ref 36.6–50.3)
HGB BLD-MCNC: 9 G/DL (ref 12.1–17)
MAGNESIUM SERPL-MCNC: 1.5 MG/DL (ref 1.6–2.4)
MCH RBC QN AUTO: 26.1 PG (ref 26–34)
MCHC RBC AUTO-ENTMCNC: 29.4 G/DL (ref 30–36.5)
MCV RBC AUTO: 88.7 FL (ref 80–99)
NRBC # BLD: 0 K/UL (ref 0–0.01)
NRBC BLD-RTO: 0 PER 100 WBC
PLATELET # BLD AUTO: 274 K/UL (ref 150–400)
PMV BLD AUTO: 11.7 FL (ref 8.9–12.9)
POTASSIUM SERPL-SCNC: 2.9 MMOL/L (ref 3.5–5.1)
RBC # BLD AUTO: 3.45 M/UL (ref 4.1–5.7)
SODIUM SERPL-SCNC: 142 MMOL/L (ref 136–145)
WBC # BLD AUTO: 18.2 K/UL (ref 4.1–11.1)

## 2023-01-08 PROCEDURE — 74011000258 HC RX REV CODE- 258: Performed by: INTERNAL MEDICINE

## 2023-01-08 PROCEDURE — 74011250637 HC RX REV CODE- 250/637: Performed by: PHYSICIAN ASSISTANT

## 2023-01-08 PROCEDURE — 74011000258 HC RX REV CODE- 258: Performed by: PHYSICIAN ASSISTANT

## 2023-01-08 PROCEDURE — 74011250637 HC RX REV CODE- 250/637: Performed by: INTERNAL MEDICINE

## 2023-01-08 PROCEDURE — 80048 BASIC METABOLIC PNL TOTAL CA: CPT

## 2023-01-08 PROCEDURE — 74011250636 HC RX REV CODE- 250/636: Performed by: INTERNAL MEDICINE

## 2023-01-08 PROCEDURE — P9047 ALBUMIN (HUMAN), 25%, 50ML: HCPCS | Performed by: STUDENT IN AN ORGANIZED HEALTH CARE EDUCATION/TRAINING PROGRAM

## 2023-01-08 PROCEDURE — 74011250637 HC RX REV CODE- 250/637: Performed by: GENERAL ACUTE CARE HOSPITAL

## 2023-01-08 PROCEDURE — 65270000046 HC RM TELEMETRY

## 2023-01-08 PROCEDURE — 74011000250 HC RX REV CODE- 250: Performed by: GENERAL ACUTE CARE HOSPITAL

## 2023-01-08 PROCEDURE — 74011250636 HC RX REV CODE- 250/636: Performed by: STUDENT IN AN ORGANIZED HEALTH CARE EDUCATION/TRAINING PROGRAM

## 2023-01-08 PROCEDURE — 77010033711 HC HIGH FLOW OXYGEN

## 2023-01-08 PROCEDURE — 71045 X-RAY EXAM CHEST 1 VIEW: CPT

## 2023-01-08 PROCEDURE — 74011000250 HC RX REV CODE- 250: Performed by: PHYSICIAN ASSISTANT

## 2023-01-08 PROCEDURE — 93005 ELECTROCARDIOGRAM TRACING: CPT

## 2023-01-08 PROCEDURE — 36415 COLL VENOUS BLD VENIPUNCTURE: CPT

## 2023-01-08 PROCEDURE — 74011250636 HC RX REV CODE- 250/636: Performed by: GENERAL ACUTE CARE HOSPITAL

## 2023-01-08 PROCEDURE — 74011250636 HC RX REV CODE- 250/636: Performed by: PHYSICIAN ASSISTANT

## 2023-01-08 PROCEDURE — 85027 COMPLETE CBC AUTOMATED: CPT

## 2023-01-08 PROCEDURE — 83735 ASSAY OF MAGNESIUM: CPT

## 2023-01-08 RX ORDER — MAGNESIUM SULFATE HEPTAHYDRATE 40 MG/ML
2 INJECTION, SOLUTION INTRAVENOUS ONCE
Status: COMPLETED | OUTPATIENT
Start: 2023-01-08 | End: 2023-01-08

## 2023-01-08 RX ORDER — SUCRALFATE 1 G/1
1 TABLET ORAL
Status: DISCONTINUED | OUTPATIENT
Start: 2023-01-08 | End: 2023-01-18 | Stop reason: HOSPADM

## 2023-01-08 RX ORDER — POTASSIUM CHLORIDE 7.45 MG/ML
10 INJECTION INTRAVENOUS
Status: COMPLETED | OUTPATIENT
Start: 2023-01-08 | End: 2023-01-08

## 2023-01-08 RX ADMIN — SUCRALFATE 1 G: 1 TABLET ORAL at 12:54

## 2023-01-08 RX ADMIN — ENOXAPARIN SODIUM 40 MG: 100 INJECTION SUBCUTANEOUS at 10:34

## 2023-01-08 RX ADMIN — MORPHINE SULFATE 2 MG: 2 INJECTION, SOLUTION INTRAMUSCULAR; INTRAVENOUS at 16:41

## 2023-01-08 RX ADMIN — SODIUM CHLORIDE, PRESERVATIVE FREE 10 ML: 5 INJECTION INTRAVENOUS at 13:11

## 2023-01-08 RX ADMIN — MIDODRINE HYDROCHLORIDE 10 MG: 5 TABLET ORAL at 22:41

## 2023-01-08 RX ADMIN — NOREPINEPHRINE BITARTRATE 7 MCG/MIN: 1 SOLUTION INTRAVENOUS at 01:43

## 2023-01-08 RX ADMIN — OSELTAMIVIR PHOSPHATE 30 MG: 30 CAPSULE ORAL at 17:30

## 2023-01-08 RX ADMIN — SODIUM CHLORIDE, PRESERVATIVE FREE 10 ML: 5 INJECTION INTRAVENOUS at 22:42

## 2023-01-08 RX ADMIN — POTASSIUM CHLORIDE 10 MEQ: 10 INJECTION, SOLUTION INTRAVENOUS at 04:18

## 2023-01-08 RX ADMIN — POTASSIUM CHLORIDE 10 MEQ: 10 INJECTION, SOLUTION INTRAVENOUS at 03:02

## 2023-01-08 RX ADMIN — OSELTAMIVIR PHOSPHATE 30 MG: 30 CAPSULE ORAL at 10:34

## 2023-01-08 RX ADMIN — SUCRALFATE 1 G: 1 TABLET ORAL at 16:32

## 2023-01-08 RX ADMIN — ALBUMIN (HUMAN) 50 G: 0.25 INJECTION, SOLUTION INTRAVENOUS at 00:41

## 2023-01-08 RX ADMIN — SUCRALFATE 1 G: 1 TABLET ORAL at 10:34

## 2023-01-08 RX ADMIN — SODIUM CHLORIDE, PRESERVATIVE FREE 10 ML: 5 INJECTION INTRAVENOUS at 06:22

## 2023-01-08 RX ADMIN — PIPERACILLIN AND TAZOBACTAM 3.38 G: 3; .375 INJECTION, POWDER, FOR SOLUTION INTRAVENOUS at 12:54

## 2023-01-08 RX ADMIN — PIPERACILLIN AND TAZOBACTAM 3.38 G: 3; .375 INJECTION, POWDER, FOR SOLUTION INTRAVENOUS at 04:18

## 2023-01-08 RX ADMIN — ONDANSETRON 4 MG: 2 INJECTION INTRAMUSCULAR; INTRAVENOUS at 03:09

## 2023-01-08 RX ADMIN — MIDODRINE HYDROCHLORIDE 10 MG: 5 TABLET ORAL at 16:32

## 2023-01-08 RX ADMIN — MAGNESIUM SULFATE HEPTAHYDRATE 2 G: 40 INJECTION, SOLUTION INTRAVENOUS at 01:51

## 2023-01-08 RX ADMIN — SUCRALFATE 1 G: 1 TABLET ORAL at 22:41

## 2023-01-08 RX ADMIN — POTASSIUM CHLORIDE 10 MEQ: 10 INJECTION, SOLUTION INTRAVENOUS at 05:23

## 2023-01-08 RX ADMIN — PIPERACILLIN AND TAZOBACTAM 3.38 G: 3; .375 INJECTION, POWDER, FOR SOLUTION INTRAVENOUS at 20:37

## 2023-01-08 RX ADMIN — MIDODRINE HYDROCHLORIDE 10 MG: 5 TABLET ORAL at 10:34

## 2023-01-08 RX ADMIN — POTASSIUM CHLORIDE 10 MEQ: 10 INJECTION, SOLUTION INTRAVENOUS at 06:22

## 2023-01-08 RX ADMIN — POTASSIUM CHLORIDE 10 MEQ: 10 INJECTION, SOLUTION INTRAVENOUS at 01:39

## 2023-01-08 RX ADMIN — POTASSIUM CHLORIDE 10 MEQ: 10 INJECTION, SOLUTION INTRAVENOUS at 07:25

## 2023-01-08 NOTE — PROGRESS NOTES
Pina with MD at bedside in morning rounds r/t patient low blood pressure and on Levo. 46 MD contacted via perfect serve r/t low blood pressure    1825 MD contacted via perfect serve related to low blood pressure and update and update on amount of Levo, HR, Rhythm. 1830 Rapid called related to patient blood pressure and amount of Levophed,, patient lethargic, blood sugar 115. End of shift report  given to [facility] CARSON Doe. Rapid RN still in room. Patient on Levophed at 8. Bolus and Albumin. RT also called for respiratory concerns. SBAR, Kardex, I&O, nausea, NPO all day, Lines, trach, IV and rapid, and plan of care to include possible transfer to ICU addressed. RN passed on ICU phone number to RN per his request to call him at anytime if needed.

## 2023-01-08 NOTE — PROGRESS NOTES
RAPID RESPONSE    Overhead rapid response called @ 1928 for room 2244. Reason for RRT: hypotension, maximum dose of levophed. Initial assessment: Pt lethargic, responds to voice. SBP 80's, MAP 50's. RRT RN increased levophed gtt and called MD.     Interventions: Dr. Gaurang Sanford at bedside. Dr. Migdalia Baker on the phone. Plan for patient to be treated and then go back home with hospice. Albumin, 500 mL 1/2NS, and one dose of solu-cortef. Lay pt flat as tolerated. Pt then began coughing, HOB elevated. Pt vomited and appeared to aspirate. Oxygen saturation dropped to 60's, RT at bedside and increased oxygen to 55L/100% and deep suctioned. Oxygen saturation now 95-96%. Will wean levophed gtt for goal MAP > 65. 2 PIV started in addition to port-a-cath. Outcome: Stay in 2244. Maximum dose of levophed 8 mcg/min on PCU. Pt on 6 mcg/min. MAP > 65. O2 sats 96%. 200 - Dr. Gaurang Sanford updated on patient condition. Aware pt cannot tolerate HOB flat or trendelenberg due to aspiration of vomit. On levophed @ 7 mcg /min for MAP 62. Pt to stay in room 2244 at this time.

## 2023-01-08 NOTE — PROGRESS NOTES
1900: Bedside shift change report given to Aden Cameron RN (oncoming nurse) by Dane Lindo RN (offgoing nurse). Report included the following information SBAR, Kardex, Intake/Output, MAR, Recent Results, and Cardiac Rhythm NSR/ST . 1915: RRT RN and day shift RN at bedside with patient desatting to 63%. Respiratory was called and came to bedside. Increased patient's heated hiflow to 50L and 100% and deep suctioned patient with improvement of sats up to 95%. Levophed currently at 6mcg, bolus running, Albumin finished. Patient alert. 1941: Titrated Levophed to 7mcg. RRT Nurse [facility] S[facility] W[facility]CA notified as well as PA.     1950: Bolus finished. RN messaged ELLIS Urrutia to come to bedside to assess pt.     1954: Titrated Levo 8mcg. 1959: Suctioned patient. Night shift RRT RN Angie rounded on patient. RN updated her that patient is maxed on Levo at [address]. BP holding for now. 2005: PA will come to bedside to assess pt.     2025: RN friends of the patient listed in the chart: Teodoro Gurrola, and Nighat Chirinos. RN attempted to ask patient who we could talk to about the patient's care. Patient nodded that he understood what I was asking him but shook his head \"no\" when RN asked if we could talk to any of those people about his care. 2036: ELLIS Urrutia, RRT RN Angie and primary RN at bedside to talk about plan of care and assess patient. ELLIS Urrutia asked patient again if he would like full treatment to which patient nodded \"yes\". Primary RN updated PA on fluids, Albumin and Levophed. PA will talk to intensivist about moving patient to the ICU.     2119: Order for Ct scan. RN talked with Radiology about patient unable to lie flat due to episode earlier where the patient was lying flat due to hypotension and the patient vomited. RN sent message to ELLIS Urrutia letting him know and asking if the CT should be attempted or if we need to wait at this time.  Rn also asked about placing pino since that was mentioned at bedside. 2200: Still awaiting reply from PA.     2213: Lactic acid drawn and sent. 2220: Message sent to [facility] STAT about patient's current BP 76/56 (60) and patient is still maxed on Levo. Waiting for reply. 2343: RN sent message to [facility] about lactic acid of 0.9. Waiting for reply. 2354: PA notified that patient had 15 beats of SVT. Prieto placed. 0002: PA messaged about what labs they would like to draw. BMP and CBC drawn and sent. 0015: PA advised that they are okay with current pressures and lactic acid. RN clarified if wanted to leave the patient at 8 mcg of Levophed and provider advised to wean as able to keep MAP >65. RN also clarified about CT since patient vomited and aspirated earlier on day shift when laying flat. PA advised that since lactic is okay can hold off on CT for now. 0115: PA advised of patient's potassium of 2.9. New orders received for potassium IV.     0141: Titrated Levo to 7 mcg. BP stable. 0145: Face to face with provider and advised him of mag level of 1.5. Verbal order received for 2g of magnesium one time. 0236: Titrated levo to 6 mcg. Bps stable. 0300: RN in room with RRT RN to perform EKG due to irregular rhythm seen on monitor. While getting EKG set up, patient started to vomit dark brown. PRN zofran given and patient suctioned. SPO2 stayed in the upper 90's. RRT RN Angie called [facility] to update him. EKG showed Afib with RVR at a rate of 140. Another EKG was taken while the patient was at a rate of 80 and it showed normal sinus. Provider advised that we will monitor it and if patient sustains Afib with RVR will look at interventions. Patient currently on 2nd run of K and finishing run of Mg.     0315: Patient's gown was changed, mouth care performed. EKG stickers and wires changed out. 0530: RT at bedside to do trach care.      0540: Titrated Levo to 5 mcg.     5453: Titrated Levo to 4 mcg.     0705: Titrated Levo to 3 mcg. End of Shift Note    Bedside shift change report given to Eros Hayward RN (oncoming nurse) by Tara Salgado RN (offgoing nurse). Report included the following information SBAR, Kardex, Intake/Output, MAR, Recent Results, and Cardiac Rhythm NSR    Shift worked:  [id number]     Shift summary and any significant changes:     See above     Concerns for physician to address:       Zone phone for oncoming shift:          Activity:  Activity Level: Bed Rest  Number times ambulated in hallways past shift: 0  Number of times OOB to chair past shift: 0    Cardiac:   Cardiac Monitoring: Yes      Cardiac Rhythm: Sinus Rhythm    Access:  Current line(s): PIV and central line     Genitourinary:   Urinary status: pino    Respiratory:   O2 Device: Heated, Hi flow nasal cannula, Tracheostomy  Chronic home O2 use?: YES  Incentive spirometer at bedside: NO       GI:  Last Bowel Movement Date: 01/07/23  Current diet:  DIET NPO Ice Chips, Sips of Water with Meds, Sips of Clear Liquids, Popsicles  ADULT TUBE FEEDING PEG; Standard with Fiber; Delivery Method: Continuous; Continuous Initial Rate (mL/hr): 30; Continuous Advance Tube Feeding: Yes; Advancement Volume (mL/hr): 10; Advancement Frequency: Q 8 hours; Continuous Goal Rate (mL/hr): 50. .. Passing flatus: YES  Tolerating current diet: NO       Pain Management:   Patient states pain is manageable on current regimen: YES    Skin:  Edward Score: 14  Interventions: turn team, speciality bed, float heels, increase time out of bed, foam dressing, PT/OT consult, limit briefs, internal/external urinary devices, and nutritional support     Patient Safety:  Fall Score:  Total Score: 3  Interventions: bed/chair alarm, assistive device (walker, cane, etc), gripper socks, and pt to call before getting OOB  High Fall Risk: Yes    Length of Stay:  Expected LOS: 4d 9h  Actual LOS: [facility]

## 2023-01-08 NOTE — PROGRESS NOTES
Hospitalist Progress Note    NAME: [facility]   :  1960   MRN:  [id number]       Assessment / Plan:  Acute on chronic hypoxic respiratory failure  Trach dislodgement  Influenza A infection   H/o Head and neck Ca  Was on Hospice care. He revoked hospice upon admission  HARVEY, Cr was 1.1 on 22  Hypernatremia/hyperchloremia/dehydration     Anemia  Hypokalemia, replete with KCl  Possible aspiration pna  RRT on  for low BP, pt had vomiting episode during RRT and thought top have aspirated. Will check CXR. He is already on IV zosyn which would cover anerobic organisms, will cont'  On midodrine tid, 3mcg of levophed, wean as tolerated  Leukocytosis trending up, likely from aspiration, stress rxn from low bp  Cont' with Tamiflu, nebs  Wean O2 as tolerated   Cont' empiric IV abx, tamiflu  Nebs. HOLD TF until oxygenation requirement improves  Resume TF today, check for residuals  Nutrition consulted    I discussed GOC again with the patient. He wants to continue with current care, however does not wish to be placed on the ventilator if he max out on Hiflow. Code Status: DNR, was under hospice  Surrogate Decision Maker: Guillermo Matos, [phone]. H[facility]ice[facility] [phone]  DVT Prophylaxis: lovenox   GI Prophylaxis: not indicated  Baseline: PEG/Trach under [facility] care at [facility]     Subjective:     Chief Complaint / Reason for Physician Visit  Comfortable. On 50LHF. Discussed with RN events overnight. Review of Systems:  Symptom Y/N Comments  Symptom Y/N Comments   Fever/Chills    Chest Pain     Poor Appetite    Edema     Cough    Abdominal Pain     Sputum    Joint Pain     SOB/CAI    Pruritis/Rash     Nausea/vomit    Tolerating PT/OT     Diarrhea    Tolerating Diet     Constipation    Other       Could NOT obtain due to:      Objective:     VITALS:   Last 24hrs VS reviewed since prior progress note.  Most recent are:  Patient Vitals for the past 24 hrs:   Temp Pulse Resp BP SpO2 01/08/23 0800 -- 98 -- -- --   01/08/23 0702 -- 83 22 124/74 98 %   01/08/23 0644 -- 85 20 103/64 93 %   01/08/23 0629 -- 86 21 115/72 94 %   01/08/23 0614 -- 84 19 102/65 93 %   01/08/23 0559 -- 84 21 103/74 93 %   01/08/23 0544 -- 83 21 115/73 94 %   01/08/23 0529 -- 80 20 120/72 97 %   01/08/23 0520 -- -- -- -- 98 %   01/08/23 0513 -- -- -- -- 99 %   01/08/23 0500 -- 81 20 98/62 98 %   01/08/23 0445 -- 81 20 101/61 97 %   01/08/23 0430 -- 82 20 (!) 95/58 95 %   01/08/23 0419 -- -- -- -- 97 %   01/08/23 0415 -- 84 20 96/62 96 %   01/08/23 0400 -- 83 20 (!) 105/59 96 %   01/08/23 0345 -- 83 19 99/62 95 %   01/08/23 0330 -- 85 20 100/62 95 %   01/08/23 0315 -- 94 22 109/61 94 %   01/08/23 0300 -- 96 -- 109/71 98 %   01/08/23 0245 -- 80 -- 102/73 100 %   01/08/23 0230 99.1 °F (37.3 °C) 80 20 121/72 100 %   01/08/23 0215 -- 77 -- 115/69 100 %   01/08/23 0200 -- 79 -- 118/74 100 %   01/08/23 0145 -- (!) 117 -- 118/67 100 %   01/08/23 0130 -- (!) 111 -- 117/76 100 %   01/08/23 0115 -- 98 -- 102/69 98 %   01/08/23 0100 -- 82 -- 101/69 97 %   01/08/23 0045 -- 96 -- (!) 79/52 91 %   01/08/23 0030 -- 99 -- 94/68 94 %   01/08/23 0015 -- 88 -- 104/63 96 %   01/08/23 0012 -- -- -- -- 96 %   01/08/23 0000 -- 88 -- 102/71 94 %   01/07/23 2345 98.2 °F (36.8 °C) 87 22 104/79 97 %   01/07/23 2330 -- 84 20 102/68 95 %   01/07/23 2315 -- 90 20 113/74 95 %   01/07/23 2300 -- (!) 103 18 99/63 95 %   01/07/23 2245 -- 81 21 106/68 96 %   01/07/23 2230 -- 88 21 101/63 95 %   01/07/23 2215 -- 83 20 (!) 76/56 95 %   01/07/23 2210 -- (!) 117 20 119/76 95 %   01/07/23 2205 -- 85 20 98/63 95 %   01/07/23 2200 -- 85 19 93/61 95 %   01/07/23 2155 -- 87 19 92/63 94 %   01/07/23 2150 -- (!) 105 20 100/68 95 %   01/07/23 2145 -- 87 20 104/69 93 %   01/07/23 2140 -- 86 19 (!) 100/58 94 %   01/07/23 2135 -- 85 20 (!) 88/68 94 %   01/07/23 2130 -- 84 19 110/72 95 %   01/07/23 2125 -- 90 22 108/73 95 %   01/07/23 2120 -- 82 22 99/65 94 %   01/07/23 2115 -- 84 20 (!) 89/57 94 %   01/07/23 2112 -- -- -- -- 92 %   01/07/23 2040 -- 91 21 102/72 94 %   01/07/23 2035 -- 87 21 (!) 88/69 94 %   01/07/23 2030 -- 87 20 94/60 94 %   01/07/23 2025 -- 88 19 116/64 96 %   01/07/23 2020 -- 84 21 103/67 94 %   01/07/23 2015 -- 84 22 103/66 94 %   01/07/23 2010 -- 84 22 97/69 94 %   01/07/23 2005 -- 84 22 96/62 94 %   01/07/23 2000 -- 86 25 108/63 94 %   01/07/23 1955 -- 87 20 93/62 91 %   01/07/23 1950 98.3 °F (36.8 °C) 89 23 (!) 74/51 91 %   01/07/23 1945 -- 84 18 (!) 87/63 93 %   01/07/23 1940 -- 87 20 (!) 90/58 93 %   01/07/23 1935 -- 89 13 (!) 95/59 93 %   01/07/23 1930 98.7 °F (37.1 °C) 90 22 101/63 95 %   01/07/23 1925 -- 89 21 96/60 95 %   01/07/23 1920 -- 90 23 98/62 95 %   01/07/23 1915 -- 90 18 -- 96 %   01/07/23 1910 -- 93 17 109/62 95 %   01/07/23 1905 -- 89 14 104/63 90 %   01/07/23 1900 -- 88 24 101/61 (!) 89 %   01/07/23 1855 -- 92 15 116/88 (!) 84 %   01/07/23 1850 100.1 °F (37.8 °C) 84 19 91/69 91 %   01/07/23 1845 -- 86 18 (!) 80/59 (!) 89 %   01/07/23 1840 -- 97 24 100/62 91 %   01/07/23 1835 -- 89 19 98/79 90 %   01/07/23 1610 99.6 °F (37.6 °C) 88 20 106/70 93 %   01/07/23 1601 -- 84 21 (!) 82/49 90 %   01/07/23 1430 -- 85 16 90/62 92 %   01/07/23 1237 -- 88 -- 90/68 --   01/07/23 1230 99.6 °F (37.6 °C) 90 18 94/61 96 %   01/07/23 1225 -- 91 19 (!) 80/62 95 %   01/07/23 1219 -- 91 -- (!) 76/56 --   01/07/23 1110 -- 95 20 139/80 98 %   01/07/23 1100 -- 95 20 (!) 69/48 90 %   01/07/23 1050 -- 97 18 (!) 89/56 91 %   01/07/23 1040 -- 91 17 (!) 86/59 91 %   01/07/23 1030 -- 89 17 (!) 84/55 91 %   01/07/23 1020 -- 89 18 94/60 93 %         Intake/Output Summary (Last 24 hours) at 1/8/2023 1015  Last data filed at 1/8/2023 0820  Gross per 24 hour   Intake 0 ml   Output 875 ml   Net -875 ml          I had a face to face encounter and independently examined this patient on 1/8/2023, as outlined below:  PHYSICAL EXAM:  General: Alert, nad, cachetic  EENT:  EOMI. Anicteric sclerae. MMM  Resp:  Coarse,  no wheezing or rales. No accessory muscle use  CV:  Regular  rhythm,  No edema  GI:  Soft, Non distended, Non tender. +PEG  Neurologic:  Alert and oriented X 3, nod to questions  Psych:   Not anxious nor agitated  Skin:  No rashes. No jaundice    Reviewed most current lab test results and cultures  YES  Reviewed most current radiology test results   YES  Review and summation of old records today    NO  Reviewed patient's current orders and MAR    YES  PMH/SH reviewed - no change compared to H&P  ________________________________________________________________________  Care Plan discussed with:    Comments   Patient x    Family      RN x    Care Manager     Consultant                        Multidiciplinary team rounds were held today with , nursing, pharmacist and clinical coordinator. Patient's plan of care was discussed; medications were reviewed and discharge planning was addressed. ________________________________________________________________________  Total NON critical care TIME:  35   Minutes    Total CRITICAL CARE TIME Spent:   Minutes non procedure based      Comments   >50% of visit spent in counseling and coordination of care     ________________________________________________________________________  Tony Carbajal MD     Procedures: see electronic medical records for all procedures/Xrays and details which were not copied into this note but were reviewed prior to creation of Plan. LABS:  I reviewed today's most current labs and imaging studies.   Pertinent labs include:  Recent Labs     01/08/23  0036 01/07/23  0256   WBC 18.2* 13.1*   HGB 9.0* 8.2*   HCT 30.6* 27.7*    125*       Recent Labs     01/08/23  0036 01/07/23  0256 01/06/23  0406    143 142   K 2.9* 2.9* 3.4*    103 105   CO2 35* 37* 35*   * 96 154*   BUN 26* 26* 29*   CREA 1.30 1.27 1.33*   CA 7.9* 7.8* 8.1*   MG 1.5*  --   --          Signed: Tony Carbajal, MD

## 2023-01-08 NOTE — CONSULTS
Pulmonary, Critical Care, and Sleep Medicine    Initial Patient Consult    Name: Tamica Butts MRN: [id number]   : 1960 Hospital: [facility]   Date: 2023        IMPRESSION:   Hypoxic respiratory failure on 20L HF via tracheostomy  Witnessed aspiration event 23 - on abx coverage for aspiration pneumonia - there is no dense or obvious fluffy infiltrate within the right lung, but there are crackles on the right on exam  Influenza A+ 23, on Tamiflu. COVID negative  Tracheostomy w/ hx of H&N cancer  Hypotension  Hypokalemia, hypomagnesemia  PEG tube for nutrition      RECOMMENDATIONS:   Wean O2 as able  Continue trach care, careful suctioning if needed. Trend temps and WBC. Check a sputum culture. New set of blood cultures if febrile. CXR in AM  Aspiration precautions with HOB always elevated. Recommend strict NPO status until clinical status improves with exception of swabs for comfort. Note intensivist is consulted due to need for norepi hemodynamic support  Prognosis guarded. DNR status. Subjective: This patient has been seen and evaluated at the request of Dr. Carolina Antonio for hypoxia, flu / aspiration and tracheostomy status. Patient is a 57 y.o. male admitted here on  b/c of trach dislodgement. He was hypoxic and diagnosed with Influenza A. He's been on HF O2. During trach care / suctioning yesterday, he vomited and the RT witnessed an aspiration event. He's on Zosyn. He has a PEG tube. He was in hospice care until he was admitted. Hypotension has been an issue and he's on midodrine and norepinephrine gtt. Pressures today ranging  systolic. Lactate has been normal.  WBC has fluctuated, being normal on  and now up to 18.2  Tmax 100.2 in the past 24 hours.  paired blood cultures without growth      He is awake and wants iced tea. Denies chest pain or bothersome cough.    ROS is limited to respiratory system due to pt's lack of verbal communication. Past Medical History:   Diagnosis Date    Cancer ([facility] [address]     Tongue and lung      Past Surgical History:   Procedure Laterality Date    HX OTHER SURGICAL      HX VASCULAR ACCESS      MT UNLISTED PROCEDURE ABDOMEN PERITONEUM & OMENTUM        Prior to Admission medications    Not on File     No Known Allergies   Social History     Tobacco Use    Smoking status: Not on file    Smokeless tobacco: Not on file   Substance Use Topics    Alcohol use: Not on file      History reviewed. No pertinent family history. Current Facility-Administered Medications   Medication Dose Route Frequency    sucralfate (CARAFATE) tablet 1 g  1 g Oral AC&HS    NOREPINephrine (LEVOPHED) 8 mg in 5% dextrose 250mL (32 mcg/mL) infusion  0.5-16 mcg/min IntraVENous TITRATE    midodrine (PROAMATINE) tablet 10 mg  10 mg Oral TID    [Held by provider] dextrose 5% infusion  50 mL/hr IntraVENous CONTINUOUS    piperacillin-tazobactam (ZOSYN) 3.375 g in 0.9% sodium chloride (MBP/ADV) 100 mL MBP  3.375 g IntraVENous Q8H    sodium chloride (NS) flush 5-40 mL  5-40 mL IntraVENous Q8H    enoxaparin (LOVENOX) injection 40 mg  40 mg SubCUTAneous DAILY    oseltamivir (TAMIFLU) capsule 30 mg  30 mg Oral BID       Review of Systems:  Pertinent items are noted in HPI.     Objective:   Vital Signs:    Visit Vitals  BP (!) 95/59 (BP 1 Location: Other(comment), BP Patient Position: Semi fowlers) Comment (BP 1 Location): left ear   Pulse 90   Temp 99.8 °F (37.7 °C)   Resp 22   Ht 6' 5\" (1.956 m)   Wt 53.7 kg (118 lb 6.2 oz)   SpO2 (!) 89%   BMI 14.04 kg/m²       O2 Device: Heated, Hi flow nasal cannula, Tracheostomy   O2 Flow Rate (L/min): 50 l/min   Temp (24hrs), Av.2 °F (37.3 °C), Min:98.2 °F (36.8 °C), Max:100.1 °F (37.8 °C)       Intake/Output:   Last shift:      701 -  1900  In: -   Out: 350 [Urine:350]  Last 3 shifts: 1901 -  0700  In: 0   Out: 5679 [Urine:1375]    Intake/Output Summary (Last 24 hours) at 2023 [facility] filed at 1/8/2023 0820  Gross per 24 hour   Intake 0 ml   Output 875 ml   Net -875 ml      Physical Exam:   General:  Cachectic, older man resting in bed in no distress. Head:  Normocephalic, without obvious abnormality, atraumatic. Eyes:  Conjunctivae/corneas clear. PERRL, EOMs intact. Nose: Nares normal.        Neck: Supple, symmetrical, tracheostomy with HF O2 entrained. Lungs:   Fine crackles right base. No wheezing or rhonchi elsewhere       Heart:  Regular rate and rhythm, S1, S2 normal, no murmur, click, rub or gallop. Abdomen:   Soft, non-tender. PEG tube   Extremities: Extremities normal, atraumatic, no cyanosis or edema. Skin: No rashes or lesions       Neurologic: Grossly nonfocal. He writes to communicate.      Data review:     Recent Results (from the past 24 hour(s))   GLUCOSE, POC    Collection Time: 01/07/23  6:22 PM   Result Value Ref Range    Glucose (POC) 115 65 - 117 mg/dL    Performed by Joan Metcalf (ANTONIA RN)    LACTIC ACID    Collection Time: 01/07/23 10:08 PM   Result Value Ref Range    Lactic acid 0.9 0.4 - 2.0 MMOL/L   CBC W/O DIFF    Collection Time: 01/08/23 12:36 AM   Result Value Ref Range    WBC 18.2 (H) 4.1 - 11.1 K/uL    RBC 3.45 (L) 4.10 - 5.70 M/uL    HGB 9.0 (L) 12.1 - 17.0 g/dL    HCT 30.6 (L) 36.6 - 50.3 %    MCV 88.7 80.0 - 99.0 FL    MCH 26.1 26.0 - 34.0 PG    MCHC 29.4 (L) 30.0 - 36.5 g/dL    RDW 17.3 (H) 11.5 - 14.5 %    PLATELET 587 104 - 416 K/uL    MPV 11.7 8.9 - 12.9 FL    NRBC 0.0 0  WBC    ABSOLUTE NRBC 0.00 0.00 - 4.65 K/uL   METABOLIC PANEL, BASIC    Collection Time: 01/08/23 12:36 AM   Result Value Ref Range    Sodium 142 136 - 145 mmol/L    Potassium 2.9 (L) 3.5 - 5.1 mmol/L    Chloride 101 97 - 108 mmol/L    CO2 35 (H) 21 - 32 mmol/L    Anion gap 6 5 - 15 mmol/L    Glucose 156 (H) 65 - 100 mg/dL    BUN 26 (H) 6 - 20 MG/DL    Creatinine 1.30 0.70 - 1.30 MG/DL    BUN/Creatinine ratio 20 12 - 20      eGFR >60 >60 ml/min/1.73m2    Calcium 7.9 (L) 8.5 - 10.1 MG/DL   MAGNESIUM    Collection Time: 01/08/23 12:36 AM   Result Value Ref Range    Magnesium 1.5 (L) 1.6 - 2.4 mg/dL   EKG, 12 LEAD, SUBSEQUENT    Collection Time: 01/08/23  3:07 AM   Result Value Ref Range    Ventricular Rate 110 BPM    QRS Duration 138 ms    Q-T Interval 356 ms    QTC Calculation (Bezet) 481 ms    Calculated R Axis 82 degrees    Calculated T Axis 30 degrees    Diagnosis       Atrial fibrillation with rapid ventricular response with premature   ventricular or aberrantly conducted complexes  Right bundle branch block  Abnormal ECG  When compared with ECG of 04-JAN-2023 18:26,  Atrial fibrillation has replaced Sinus rhythm         Imaging:  I have personally reviewed the patients radiographs and have reviewed the reports:  CXR  with bilateral interstitial opacities. Small right effusion or ptx.         ELLIS Valle

## 2023-01-08 NOTE — PROGRESS NOTES
Cross coverage note:    Is a 69-year-old male with a history of GI bleed, liver cirrhosis and metastatic head neck cancer status post PEG and trach with a history of Pseudomonas and severe severe protein calorie malnutrition who was under hospice care at which time a trach partially dislodged and was sent to the emergency department for ongoing care. There is confusion about patient's hospice condition and is currently declaring treatment although remains DNR. He is not a hospice patient while in the hospital at this point and will require complementary care. Patient developed hypotension of unclear etiology overnight on 1/6/2023. He was subsequently treated with IV fluids and albumin as well as midodrine with no significant improvement. He was started on Levophed low-dose and has met his maximum amount on the stepdown floor. 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 This may be a contributing factor of his decreased systemic resistance. Patient will undergo CT of the chest for further clarification although has had a renal impairment during this admission and will proceed without contrast.  Patient also had an aspiration event earlier in the day and currently on Zosyn. His BNP is elevated at 21,000. He may benefit from dobutamine drip at this stage in the game although can only have 1 pressor available on the stepdown unit.   Lactic acid is normal with his current pressure

## 2023-01-08 NOTE — PROGRESS NOTES
RAPID RESPONSE:     Patient continues to have bursts of SVT on monitor. K and Mag replenishment in progress. (K 2.9, Mag 1.5.)  EKG in progress, patient begins to vomit dark brown fluid. Trach suction returns light tan sputum. VSS. Levophed currently @ 6mcg/min. O2 sats 95%. Zofran given for nausea/vomiting. Serial EKGs shows a fib with RVR converting back to SR with BBB. Notified ELLIS Wells of bursts of non-sustained A fib with RVR but BP improving. New N/V treated with Zofran. Orders for carafate via G tube to help coat stomach. No treatment for a fib unless sustained. Goal for K is >4.

## 2023-01-09 ENCOUNTER — APPOINTMENT (OUTPATIENT)
Dept: GENERAL RADIOLOGY | Age: 63
DRG: 143 | End: 2023-01-09
Attending: PHYSICIAN ASSISTANT
Payer: MEDICAID

## 2023-01-09 LAB
ANION GAP SERPL CALC-SCNC: 3 MMOL/L (ref 5–15)
BUN SERPL-MCNC: 32 MG/DL (ref 6–20)
BUN/CREAT SERPL: 26 (ref 12–20)
CALCIUM SERPL-MCNC: 8.4 MG/DL (ref 8.5–10.1)
CHLORIDE SERPL-SCNC: 105 MMOL/L (ref 97–108)
CO2 SERPL-SCNC: 38 MMOL/L (ref 21–32)
CREAT SERPL-MCNC: 1.24 MG/DL (ref 0.7–1.3)
GLUCOSE SERPL-MCNC: 113 MG/DL (ref 65–100)
MAGNESIUM SERPL-MCNC: 2.3 MG/DL (ref 1.6–2.4)
POTASSIUM SERPL-SCNC: 3.4 MMOL/L (ref 3.5–5.1)
SODIUM SERPL-SCNC: 146 MMOL/L (ref 136–145)

## 2023-01-09 PROCEDURE — 65270000046 HC RM TELEMETRY

## 2023-01-09 PROCEDURE — 74011000258 HC RX REV CODE- 258: Performed by: INTERNAL MEDICINE

## 2023-01-09 PROCEDURE — 74011250637 HC RX REV CODE- 250/637: Performed by: GENERAL ACUTE CARE HOSPITAL

## 2023-01-09 PROCEDURE — 74011250637 HC RX REV CODE- 250/637: Performed by: INTERNAL MEDICINE

## 2023-01-09 PROCEDURE — 80048 BASIC METABOLIC PNL TOTAL CA: CPT

## 2023-01-09 PROCEDURE — 74011000250 HC RX REV CODE- 250: Performed by: GENERAL ACUTE CARE HOSPITAL

## 2023-01-09 PROCEDURE — 74011250637 HC RX REV CODE- 250/637: Performed by: PHYSICIAN ASSISTANT

## 2023-01-09 PROCEDURE — 36415 COLL VENOUS BLD VENIPUNCTURE: CPT

## 2023-01-09 PROCEDURE — 77010033711 HC HIGH FLOW OXYGEN

## 2023-01-09 PROCEDURE — 74011250636 HC RX REV CODE- 250/636: Performed by: GENERAL ACUTE CARE HOSPITAL

## 2023-01-09 PROCEDURE — 71045 X-RAY EXAM CHEST 1 VIEW: CPT

## 2023-01-09 PROCEDURE — 83735 ASSAY OF MAGNESIUM: CPT

## 2023-01-09 PROCEDURE — 74011250636 HC RX REV CODE- 250/636: Performed by: INTERNAL MEDICINE

## 2023-01-09 RX ADMIN — SUCRALFATE 1 G: 1 TABLET ORAL at 10:22

## 2023-01-09 RX ADMIN — Medication: at 17:47

## 2023-01-09 RX ADMIN — SODIUM CHLORIDE, PRESERVATIVE FREE 10 ML: 5 INJECTION INTRAVENOUS at 21:41

## 2023-01-09 RX ADMIN — OSELTAMIVIR PHOSPHATE 30 MG: 30 CAPSULE ORAL at 10:22

## 2023-01-09 RX ADMIN — MIDODRINE HYDROCHLORIDE 10 MG: 5 TABLET ORAL at 17:46

## 2023-01-09 RX ADMIN — POTASSIUM BICARBONATE 40 MEQ: 782 TABLET, EFFERVESCENT ORAL at 12:34

## 2023-01-09 RX ADMIN — MIDODRINE HYDROCHLORIDE 10 MG: 5 TABLET ORAL at 21:31

## 2023-01-09 RX ADMIN — SUCRALFATE 1 G: 1 TABLET ORAL at 12:34

## 2023-01-09 RX ADMIN — PIPERACILLIN AND TAZOBACTAM 3.38 G: 3; .375 INJECTION, POWDER, FOR SOLUTION INTRAVENOUS at 12:34

## 2023-01-09 RX ADMIN — PIPERACILLIN AND TAZOBACTAM 3.38 G: 3; .375 INJECTION, POWDER, FOR SOLUTION INTRAVENOUS at 03:19

## 2023-01-09 RX ADMIN — PIPERACILLIN AND TAZOBACTAM 3.38 G: 3; .375 INJECTION, POWDER, FOR SOLUTION INTRAVENOUS at 21:31

## 2023-01-09 RX ADMIN — SODIUM CHLORIDE, PRESERVATIVE FREE 10 ML: 5 INJECTION INTRAVENOUS at 17:47

## 2023-01-09 RX ADMIN — MIDODRINE HYDROCHLORIDE 10 MG: 5 TABLET ORAL at 10:22

## 2023-01-09 RX ADMIN — ENOXAPARIN SODIUM 40 MG: 100 INJECTION SUBCUTANEOUS at 10:21

## 2023-01-09 RX ADMIN — SUCRALFATE 1 G: 1 TABLET ORAL at 21:31

## 2023-01-09 RX ADMIN — Medication: at 21:41

## 2023-01-09 RX ADMIN — SUCRALFATE 1 G: 1 TABLET ORAL at 17:47

## 2023-01-09 RX ADMIN — DEXTROSE MONOHYDRATE 50 ML/HR: 50 INJECTION, SOLUTION INTRAVENOUS at 13:23

## 2023-01-09 RX ADMIN — SODIUM CHLORIDE, PRESERVATIVE FREE 10 ML: 5 INJECTION INTRAVENOUS at 06:02

## 2023-01-09 NOTE — PROGRESS NOTES
Hospitalist Progress Note    NAME: Tiago Jean Baptiste   :  1960   MRN:  [id number]       Assessment / Plan:  Acute on chronic hypoxic respiratory failure  Trach dislodgement  Influenza A infection   H/o Head and neck Ca  Was on Hospice care. He revoked hospice upon admission  HARVEY, Cr was 1.1 on 22  Hypernatremia/hyperchloremia/dehydration     Anemia  Hypokalemia, replete with KCl  Aspiration pna  RRT on  for low BP, pt had vomiting episode during RRT and thought top have aspirated. CXR showed increased interstitial lung opacities bilaterally with small right pleural effusion again noted. .    Cont' with IV abx  On midodrine tid. Off pressor support this AM  Cont' with Tamiflu, nebs  Fu with sputumm cx  Wean O2 as tolerated, currently on 50LHF  Nutrition consulted, restart tube feed when o2 requirement improves  Pt does not wish to be on the ventilator if he fails HF. Discussed with pulmonary    Hypernatremia  Likely from NPO status  Start low rate d5 gtt      TAMICA:  pending O2 weaning. Currently on 60LHF. Code Status: DNR, was under hospice  Surrogate Decision Maker: Jordy Child, [phone]. [facility]ice[facility] [phone]  DVT Prophylaxis: lovenox   GI Prophylaxis: not indicated  Baseline: PEG/Trach under hosp[facility] care at [facility]     Subjective:     Chief Complaint / Reason for Physician Visit  NAD. Discussed with RN events overnight. Review of Systems:  Symptom Y/N Comments  Symptom Y/N Comments   Fever/Chills    Chest Pain     Poor Appetite    Edema     Cough    Abdominal Pain     Sputum    Joint Pain     SOB/CAI    Pruritis/Rash     Nausea/vomit    Tolerating PT/OT     Diarrhea    Tolerating Diet     Constipation    Other       Could NOT obtain due to:      Objective:     VITALS:   Last 24hrs VS reviewed since prior progress note.  Most recent are:  Patient Vitals for the past 24 hrs:   Temp Pulse Resp BP SpO2   23 1021 -- 90 -- 99/65 --   23 0815 -- -- -- -- 90 %   01/09/23 0800 -- 86 -- -- --   01/09/23 0718 98.5 °F (36.9 °C) (!) 105 21 91/63 97 %   01/09/23 0450 -- -- -- -- 93 %   01/09/23 0445 -- -- -- -- (!) 88 %   01/09/23 0403 -- 88 19 -- 90 %   01/09/23 0402 -- 88 21 -- (!) 89 %   01/09/23 0401 -- 89 19 -- (!) 89 %   01/09/23 0400 98.4 °F (36.9 °C) 90 19 (!) 92/55 (!) 89 %   01/09/23 0300 -- 86 17 (!) 86/56 90 %   01/09/23 0200 -- 86 18 (!) 88/58 91 %   01/09/23 0116 -- -- -- -- 96 %   01/09/23 0100 -- 83 17 102/61 93 %   01/09/23 0000 98.4 °F (36.9 °C) 89 22 101/65 90 %   01/08/23 2300 -- 86 18 108/70 91 %   01/08/23 2200 -- 85 18 98/72 93 %   01/08/23 2108 -- 93 26 -- 90 %   01/08/23 2100 -- 95 21 96/69 90 %   01/08/23 2000 98.4 °F (36.9 °C) 95 21 92/64 (!) 88 %   01/08/23 1632 -- 96 -- (!) 88/56 --   01/08/23 1600 -- (!) 102 -- -- --   01/08/23 1200 -- 95 -- -- --         Intake/Output Summary (Last 24 hours) at 1/9/2023 1149  Last data filed at 1/9/2023 0400  Gross per 24 hour   Intake 303.78 ml   Output 825 ml   Net -521.22 ml          I had a face to face encounter and independently examined this patient on 1/9/2023, as outlined below:  PHYSICAL EXAM:  General: Alert, nad, cachetic  EENT:  EOMI. Anicteric sclerae. MMM  Resp:  Coarse,  no wheezing or rales. No accessory muscle use  CV:  Regular  rhythm,  No edema  GI:  Soft, Non distended, Non tender. +PEG  Neurologic:  Alert and oriented X 3, nod to questions  Psych:   Not anxious nor agitated  Skin:  No rashes.   No jaundice    Reviewed most current lab test results and cultures  YES  Reviewed most current radiology test results   YES  Review and summation of old records today    NO  Reviewed patient's current orders and MAR    YES  PMH/SH reviewed - no change compared to H&P  ________________________________________________________________________  Care Plan discussed with:    Comments   Patient x    Family      RN x    Care Manager     Consultant                        Multidiciplinary team rounds were held today with , nursing, pharmacist and clinical coordinator. Patient's plan of care was discussed; medications were reviewed and discharge planning was addressed. ________________________________________________________________________  Total NON critical care TIME:  35   Minutes    Total CRITICAL CARE TIME Spent:   Minutes non procedure based      Comments   >50% of visit spent in counseling and coordination of care     ________________________________________________________________________  Tamera Nelson MD     Procedures: see electronic medical records for all procedures/Xrays and details which were not copied into this note but were reviewed prior to creation of Plan. LABS:  I reviewed today's most current labs and imaging studies.   Pertinent labs include:  Recent Labs     01/08/23  0036 01/07/23  0256   WBC 18.2* 13.1*   HGB 9.0* 8.2*   HCT 30.6* 27.7*    125*       Recent Labs     01/09/23  0142 01/08/23  0036 01/07/23  0256   * 142 143   K 3.4* 2.9* 2.9*    101 103   CO2 38* 35* 37*   * 156* 96   BUN 32* 26* 26*   CREA 1.24 1.30 1.27   CA 8.4* 7.9* 7.8*   MG 2.3 1.5*  --          Signed: Tamera Nelson MD

## 2023-01-09 NOTE — CONSULTS
Pulmonary, Critical Care, and Sleep Medicine    Initial Patient Consult    Name: Mandie Philippe MRN: [id number]   : 1960 Hospital: [facility]   Date: 2023        IMPRESSION:   Hypoxic respiratory failure on: fio2 of 90%;  60L HF via tracheostomy  Witnessed aspiration event 23 - on abx coverage for aspiration pneumonia - there is no dense or obvious fluffy infiltrate within the right lung, but there are crackles on the right on exam  Influenza A+ 23, on Tamiflu. COVID negative  Tracheostomy w/ hx of H&N cancer  Hypotension  Hypokalemia, hypomagnesemia  PEG tube for nutrition      RECOMMENDATIONS:   Wean O2 as able, now much room to wean at present. Continue trach care, careful suctioning if needed. Trend temps and WBC. Check a sputum culture. New set of blood cultures if febrile. CXR in AM  Aspiration precautions with HOB always elevated. Recommend strict NPO status until clinical status improves with exception of swabs for comfort. Note intensivist is consulted due to need for norepi hemodynamic support  Prognosis guarded. DNR status. Subjective: This patient has been seen and evaluated at the request of Dr. Lisa Cordero for hypoxia, flu / aspiration and tracheostomy status. Patient is a 57 y.o. male admitted here on  b/c of trach dislodgement. He was hypoxic and diagnosed with Influenza A. He's been on HF O2. During trach care / suctioning yesterday, he vomited and the RT witnessed an aspiration event. He's on Zosyn. He has a PEG tube. He was in hospice care until he was admitted. Hypotension has been an issue and he's on midodrine and norepinephrine gtt. Pressures today ranging  systolic. Lactate has been normal.  WBC has fluctuated, being normal on  and now up to 18.2  Tmax 100.2 in the past 24 hours.  paired blood cultures without growth      He is awake and wants iced tea. Denies chest pain or bothersome cough.    ROS is limited to respiratory system due to pt's lack of verbal communication. Past Medical History:   Diagnosis Date    Cancer ([address]     Tongue and lung      Past Surgical History:   Procedure Laterality Date    HX OTHER SURGICAL      HX VASCULAR ACCESS      MI UNLISTED PROCEDURE ABDOMEN PERITONEUM & OMENTUM        Prior to Admission medications    Not on File     No Known Allergies   Social History     Tobacco Use    Smoking status: Not on file    Smokeless tobacco: Not on file   Substance Use Topics    Alcohol use: Not on file      History reviewed. No pertinent family history. Current Facility-Administered Medications   Medication Dose Route Frequency    sucralfate (CARAFATE) tablet 1 g  1 g Oral AC&HS    NOREPINephrine (LEVOPHED) 8 mg in 5% dextrose 250mL (32 mcg/mL) infusion  0.5-16 mcg/min IntraVENous TITRATE    midodrine (PROAMATINE) tablet 10 mg  10 mg Oral TID    [Held by provider] dextrose 5% infusion  50 mL/hr IntraVENous CONTINUOUS    piperacillin-tazobactam (ZOSYN) 3.375 g in 0.9% sodium chloride (MBP/ADV) 100 mL MBP  3.375 g IntraVENous Q8H    sodium chloride (NS) flush 5-40 mL  5-40 mL IntraVENous Q8H    enoxaparin (LOVENOX) injection 40 mg  40 mg SubCUTAneous DAILY       Review of Systems:  Pertinent items are noted in HPI. Objective:   Vital Signs:    Visit Vitals  BP 99/65   Pulse 90   Temp 98.5 °F (36.9 °C)   Resp 21   Ht 6' 5\" (1.956 m)   Wt 53.7 kg (118 lb 6.2 oz)   SpO2 90%   BMI 14.04 kg/m²       O2 Device: Heated, Hi flow nasal cannula, Tracheostomy   O2 Flow Rate (L/min): 60 l/min   Temp (24hrs), Av.7 °F (37.1 °C), Min:98.4 °F (36.9 °C), Max:99.8 °F (37.7 °C)       Intake/Output:   Last shift:      No intake/output data recorded.   Last 3 shifts:  1901 -  0700  In: 608.5 [I.V.:337.5]  Out: 1400 [Urine:1400]    Intake/Output Summary (Last 24 hours) at 2023 1043  Last data filed at 2023 0400  Gross per 24 hour   Intake 303.78 ml   Output 825 ml   Net -521.22 ml        Physical Exam:   General:  Cachectic, older man resting in bed in no distress. Seems comfortable. Head:  Normocephalic, without obvious abnormality, atraumatic. Eyes:  Conjunctivae/corneas clear. PERRL, EOMs intact. Nose: Nares normal.        Neck: Supple, symmetrical, tracheostomy with HF O2 entrained. Lungs:   Fine crackles right base. No wheezing or rhonchi elsewhere. Has some decreased BS of the right lung base. Heart:  Regular rate and rhythm, S1, S2 normal, no murmur, click, rub or gallop. Abdomen:   Soft, non-tender. PEG tube   Extremities: Extremities normal, atraumatic, no cyanosis or edema. Skin: No rashes or lesions       Neurologic: Grossly nonfocal. He writes to communicate. Data review:     Recent Results (from the past 24 hour(s))   METABOLIC PANEL, BASIC    Collection Time: 01/09/23  1:42 AM   Result Value Ref Range    Sodium 146 (H) 136 - 145 mmol/L    Potassium 3.4 (L) 3.5 - 5.1 mmol/L    Chloride 105 97 - 108 mmol/L    CO2 38 (H) 21 - 32 mmol/L    Anion gap 3 (L) 5 - 15 mmol/L    Glucose 113 (H) 65 - 100 mg/dL    BUN 32 (H) 6 - 20 MG/DL    Creatinine 1.24 0.70 - 1.30 MG/DL    BUN/Creatinine ratio 26 (H) 12 - 20      eGFR >60 >60 ml/min/1.73m2    Calcium 8.4 (L) 8.5 - 10.1 MG/DL   MAGNESIUM    Collection Time: 01/09/23  1:42 AM   Result Value Ref Range    Magnesium 2.3 1.6 - 2.4 mg/dL       Imaging:  I have personally reviewed the patients radiographs and have reviewed the reports:  CXR 1-9-2023:  with diffuse bilateral interstitial opacities. Increasing Right effusion.          Maciel Scruggs MD

## 2023-01-09 NOTE — PROGRESS NOTES
0700: Bedside shift change report given to Kyler Eason RN (oncoming nurse) by Danielle Gloria RN (offgoing nurse). Report included the following information SBAR and Kardex. 1900: End of Shift Note    Bedside shift change report given to Felicita Juan RN (oncoming nurse) by Fátima Cardona RN (offgoing nurse). Report included the following information SBAR, Kardex, and MAR    Shift worked:  7a-7p     Shift summary and any significant changes:     No significant changes     Concerns for physician to address:       Zone phone for oncoming shift:          Activity:  Activity Level: Bed Rest  Number times ambulated in hallways past shift: 0  Number of times OOB to chair past shift: 0    Cardiac:   Cardiac Monitoring: Yes      Cardiac Rhythm: Sinus Rhythm    Access:  Current line(s): PIV     Genitourinary:   Urinary status: pino    Respiratory:   O2 Device: Heated, Hi flow nasal cannula, Tracheostomy  Chronic home O2 use?: YES  Incentive spirometer at bedside: YES       GI:  Last Bowel Movement Date: 01/09/23  Current diet:  DIET NPO Ice Chips, Sips of Water with Meds, Sips of Clear Liquids, Popsicles  Passing flatus: YES  Tolerating current diet: YES       Pain Management:   Patient states pain is manageable on current regimen: YES    Skin:  Edward Score: 13  Interventions: turn team, float heels, increase time out of bed, and PT/OT consult    Patient Safety:  Fall Score:  Total Score: 3  Interventions: bed/chair alarm, assistive device (walker, cane, etc), gripper socks, and pt to call before getting OOB  High Fall Risk: Yes    Length of Stay:  Expected LOS: 4d 9h  Actual LOS: 3333 Micheal Chance RN

## 2023-01-09 NOTE — PROGRESS NOTES
Comprehensive Nutrition Assessment    Type and Reason for Visit: Reassess, Consult    Nutrition Recommendations/Plan:   When ready to resume TF, recommend TwoCal formula via continuous vs bolus regimen given need to hold TF 4x/day for carafate   TwoCal @ 60 mL/hr x 16 hours + 200 mL water flushes q 4 hours (1920 kcals, 80g protein, 1872 mL water  TwoCal 240 mL bolus 4x/day + 200 mL water flushes q 4 hours (1920 kcals, 80g protein, 1872 mL water)     Nutrition Assessment:    Chart reviewed; medically noted for respiratory failure, hypotension, and aspiration pneumonia. PMH head and neck cancer, trach, and PEG. Patient currently NPO; hold TF for now per Dr. Perez Espino pending improved respiratory status. Carafate added yesterday 4x/day which requires TF to be held. Previously with orders for Jevity 1.5 but given new med, recommend TwoCal. Recommendations provided above for continuous vs bolus feedings. Will continue to monitor progress. Plans are to resume hospice on discharge.      Malnutrition Assessment:  Malnutrition Status:  Severe malnutrition (01/05/23 1106)    Context:  Chronic illness     Findings of the 6 clinical characteristics of malnutrition:   Energy Intake:  75% or less est energy requirements for 1 month or longer  Weight Loss:  Unable to assess     Body Fat Loss:  Severe body fat loss, Triceps, Fat overlying ribs, Buccal region   Muscle Mass Loss:  Severe muscle mass loss, Temples (temporalis), Clavicles (pectoralis &deltoids), Calf (gastrocnemius)  Fluid Accumulation:  No significant fluid accumulation,     Strength:  Not performed     Nutrition Related Findings:    Na 146, K+ 3.4, -297-183-96  BM 1/8  Midodrine, carafate, Levo  Wound Type: Skin tears    Current Nutrition Intake & Therapies:  Average Meal Intake: NPO     DIET NPO Ice Chips, Sips of Water with Meds, Sips of Clear Liquids, Popsicles    Anthropometric Measures:  Height: 6' 5\" (195.6 cm)  Ideal Body Weight (IBW): 208 lbs (95 kg) Current Body Wt:  53.7 kg (118 lb 6.2 oz), 56.9 % IBW. Current BMI (kg/m2): 14                          BMI Category: Underweight (BMI less than 18.5)    Estimated Daily Nutrient Needs:  Energy Requirements Based On: Formula  Weight Used for Energy Requirements: Current  Energy (kcal/day): 1882 kcals (BMR 1447 x 1. 3AF)  Weight Used for Protein Requirements: Current  Protein (g/day): 74-97g (1.4-1.8 g/kg bw)  Method Used for Fluid Requirements: 1 ml/kcal  Fluid (ml/day): 1900 mL    Nutrition Diagnosis:   Inadequate protein-energy intake related to impaired respiratory function as evidenced by NPO or clear liquid status due to medical condition    Nutrition Interventions:   Food and/or Nutrient Delivery: Start tube feeding  Nutrition Education/Counseling: No recommendations at this time  Coordination of Nutrition Care: Continue to monitor while inpatient       Goals:  Previous Goal Met: Progress towards goal(s) declining  Goals: Initiate nutrition support, by next RD assessment       Nutrition Monitoring and Evaluation:   Behavioral-Environmental Outcomes: None identified  Food/Nutrient Intake Outcomes: Enteral nutrition intake/tolerance  Physical Signs/Symptoms Outcomes: Biochemical data, Fluid status or edema, Hemodynamic status, Weight    Discharge Planning:    Enteral nutrition    May EMILE Raman  Contact: ext [phone]

## 2023-01-10 ENCOUNTER — APPOINTMENT (OUTPATIENT)
Dept: GENERAL RADIOLOGY | Age: 63
DRG: 143 | End: 2023-01-10
Attending: INTERNAL MEDICINE
Payer: MEDICAID

## 2023-01-10 LAB
ANION GAP SERPL CALC-SCNC: 3 MMOL/L (ref 5–15)
BACTERIA SPEC CULT: NORMAL
BASOPHILS # BLD: 0 K/UL (ref 0–0.1)
BASOPHILS NFR BLD: 0 % (ref 0–1)
BUN SERPL-MCNC: 30 MG/DL (ref 6–20)
BUN/CREAT SERPL: 30 (ref 12–20)
CALCIUM SERPL-MCNC: 8.1 MG/DL (ref 8.5–10.1)
CALCULATED R AXIS, ECG10: 82 DEGREES
CALCULATED T AXIS, ECG11: 30 DEGREES
CHLORIDE SERPL-SCNC: 105 MMOL/L (ref 97–108)
CO2 SERPL-SCNC: 38 MMOL/L (ref 21–32)
CREAT SERPL-MCNC: 1.01 MG/DL (ref 0.7–1.3)
DIAGNOSIS, 93000: NORMAL
DIFFERENTIAL METHOD BLD: ABNORMAL
EOSINOPHIL # BLD: 0 K/UL (ref 0–0.4)
EOSINOPHIL NFR BLD: 0 % (ref 0–7)
ERYTHROCYTE [DISTWIDTH] IN BLOOD BY AUTOMATED COUNT: 18.2 % (ref 11.5–14.5)
GLUCOSE SERPL-MCNC: 111 MG/DL (ref 65–100)
HCT VFR BLD AUTO: 26.6 % (ref 36.6–50.3)
HGB BLD-MCNC: 7.6 G/DL (ref 12.1–17)
IMM GRANULOCYTES # BLD AUTO: 0.1 K/UL (ref 0–0.04)
IMM GRANULOCYTES NFR BLD AUTO: 1 % (ref 0–0.5)
LYMPHOCYTES # BLD: 0.5 K/UL (ref 0.8–3.5)
LYMPHOCYTES NFR BLD: 5 % (ref 12–49)
MAGNESIUM SERPL-MCNC: 2.2 MG/DL (ref 1.6–2.4)
MCH RBC QN AUTO: 25.9 PG (ref 26–34)
MCHC RBC AUTO-ENTMCNC: 28.6 G/DL (ref 30–36.5)
MCV RBC AUTO: 90.8 FL (ref 80–99)
MONOCYTES # BLD: 0.6 K/UL (ref 0–1)
MONOCYTES NFR BLD: 6 % (ref 5–13)
NEUTS SEG # BLD: 9.4 K/UL (ref 1.8–8)
NEUTS SEG NFR BLD: 88 % (ref 32–75)
NRBC # BLD: 0 K/UL (ref 0–0.01)
NRBC BLD-RTO: 0 PER 100 WBC
PLATELET # BLD AUTO: 160 K/UL (ref 150–400)
PMV BLD AUTO: 11.4 FL (ref 8.9–12.9)
POTASSIUM SERPL-SCNC: 2.9 MMOL/L (ref 3.5–5.1)
Q-T INTERVAL, ECG07: 356 MS
QRS DURATION, ECG06: 138 MS
QTC CALCULATION (BEZET), ECG08: 481 MS
RBC # BLD AUTO: 2.93 M/UL (ref 4.1–5.7)
RBC MORPH BLD: ABNORMAL
SERVICE CMNT-IMP: NORMAL
SODIUM SERPL-SCNC: 146 MMOL/L (ref 136–145)
VENTRICULAR RATE, ECG03: 110 BPM
WBC # BLD AUTO: 10.6 K/UL (ref 4.1–11.1)

## 2023-01-10 PROCEDURE — 83735 ASSAY OF MAGNESIUM: CPT

## 2023-01-10 PROCEDURE — 74011250636 HC RX REV CODE- 250/636: Performed by: INTERNAL MEDICINE

## 2023-01-10 PROCEDURE — 74011000258 HC RX REV CODE- 258: Performed by: INTERNAL MEDICINE

## 2023-01-10 PROCEDURE — 65270000046 HC RM TELEMETRY

## 2023-01-10 PROCEDURE — 74011250636 HC RX REV CODE- 250/636: Performed by: PHYSICIAN ASSISTANT

## 2023-01-10 PROCEDURE — 74011000250 HC RX REV CODE- 250: Performed by: GENERAL ACUTE CARE HOSPITAL

## 2023-01-10 PROCEDURE — 74011250637 HC RX REV CODE- 250/637: Performed by: INTERNAL MEDICINE

## 2023-01-10 PROCEDURE — 74011250637 HC RX REV CODE- 250/637: Performed by: PHYSICIAN ASSISTANT

## 2023-01-10 PROCEDURE — 36415 COLL VENOUS BLD VENIPUNCTURE: CPT

## 2023-01-10 PROCEDURE — 74011250636 HC RX REV CODE- 250/636: Performed by: GENERAL ACUTE CARE HOSPITAL

## 2023-01-10 PROCEDURE — 74011250637 HC RX REV CODE- 250/637: Performed by: GENERAL ACUTE CARE HOSPITAL

## 2023-01-10 PROCEDURE — 71045 X-RAY EXAM CHEST 1 VIEW: CPT

## 2023-01-10 PROCEDURE — 74011250636 HC RX REV CODE- 250/636: Performed by: NURSE PRACTITIONER

## 2023-01-10 PROCEDURE — 77010033711 HC HIGH FLOW OXYGEN

## 2023-01-10 PROCEDURE — 85025 COMPLETE CBC W/AUTO DIFF WBC: CPT

## 2023-01-10 PROCEDURE — 80048 BASIC METABOLIC PNL TOTAL CA: CPT

## 2023-01-10 RX ORDER — POTASSIUM CHLORIDE 7.45 MG/ML
10 INJECTION INTRAVENOUS
Status: COMPLETED | OUTPATIENT
Start: 2023-01-10 | End: 2023-01-10

## 2023-01-10 RX ORDER — PROCHLORPERAZINE EDISYLATE 5 MG/ML
10 INJECTION INTRAMUSCULAR; INTRAVENOUS
Status: DISCONTINUED | OUTPATIENT
Start: 2023-01-10 | End: 2023-01-10

## 2023-01-10 RX ORDER — PROCHLORPERAZINE EDISYLATE 5 MG/ML
5 INJECTION INTRAMUSCULAR; INTRAVENOUS
Status: DISCONTINUED | OUTPATIENT
Start: 2023-01-10 | End: 2023-01-18 | Stop reason: HOSPADM

## 2023-01-10 RX ADMIN — POTASSIUM CHLORIDE 10 MEQ: 7.46 INJECTION, SOLUTION INTRAVENOUS at 06:41

## 2023-01-10 RX ADMIN — SODIUM CHLORIDE, PRESERVATIVE FREE 10 ML: 5 INJECTION INTRAVENOUS at 22:42

## 2023-01-10 RX ADMIN — ONDANSETRON 4 MG: 2 INJECTION INTRAMUSCULAR; INTRAVENOUS at 14:24

## 2023-01-10 RX ADMIN — OXYCODONE 5 MG: 5 TABLET ORAL at 12:37

## 2023-01-10 RX ADMIN — POTASSIUM CHLORIDE 10 MEQ: 7.46 INJECTION, SOLUTION INTRAVENOUS at 11:02

## 2023-01-10 RX ADMIN — MIDODRINE HYDROCHLORIDE 10 MG: 5 TABLET ORAL at 17:44

## 2023-01-10 RX ADMIN — POTASSIUM BICARBONATE 40 MEQ: 782 TABLET, EFFERVESCENT ORAL at 09:06

## 2023-01-10 RX ADMIN — PIPERACILLIN AND TAZOBACTAM 3.38 G: 3; .375 INJECTION, POWDER, FOR SOLUTION INTRAVENOUS at 20:04

## 2023-01-10 RX ADMIN — ACETAMINOPHEN 650 MG: 325 TABLET ORAL at 06:15

## 2023-01-10 RX ADMIN — POTASSIUM CHLORIDE 10 MEQ: 7.46 INJECTION, SOLUTION INTRAVENOUS at 05:35

## 2023-01-10 RX ADMIN — ONDANSETRON 4 MG: 2 INJECTION INTRAMUSCULAR; INTRAVENOUS at 20:03

## 2023-01-10 RX ADMIN — OXYCODONE 5 MG: 5 TABLET ORAL at 06:15

## 2023-01-10 RX ADMIN — ENOXAPARIN SODIUM 40 MG: 100 INJECTION SUBCUTANEOUS at 09:06

## 2023-01-10 RX ADMIN — POTASSIUM CHLORIDE 10 MEQ: 7.46 INJECTION, SOLUTION INTRAVENOUS at 08:44

## 2023-01-10 RX ADMIN — MORPHINE SULFATE 2 MG: 2 INJECTION, SOLUTION INTRAMUSCULAR; INTRAVENOUS at 20:03

## 2023-01-10 RX ADMIN — SUCRALFATE 1 G: 1 TABLET ORAL at 16:30

## 2023-01-10 RX ADMIN — SODIUM CHLORIDE, PRESERVATIVE FREE 10 ML: 5 INJECTION INTRAVENOUS at 04:17

## 2023-01-10 RX ADMIN — MIDODRINE HYDROCHLORIDE 10 MG: 5 TABLET ORAL at 09:06

## 2023-01-10 RX ADMIN — Medication: at 17:45

## 2023-01-10 RX ADMIN — SODIUM CHLORIDE, PRESERVATIVE FREE 10 ML: 5 INJECTION INTRAVENOUS at 14:24

## 2023-01-10 RX ADMIN — Medication: at 22:42

## 2023-01-10 RX ADMIN — PROCHLORPERAZINE EDISYLATE 5 MG: 5 INJECTION INTRAMUSCULAR; INTRAVENOUS at 23:07

## 2023-01-10 RX ADMIN — POTASSIUM CHLORIDE 10 MEQ: 7.46 INJECTION, SOLUTION INTRAVENOUS at 12:37

## 2023-01-10 RX ADMIN — POTASSIUM CHLORIDE 10 MEQ: 7.46 INJECTION, SOLUTION INTRAVENOUS at 14:24

## 2023-01-10 RX ADMIN — MIDODRINE HYDROCHLORIDE 5 MG: 5 TABLET ORAL at 06:15

## 2023-01-10 RX ADMIN — DEXTROSE MONOHYDRATE 50 ML/HR: 50 INJECTION, SOLUTION INTRAVENOUS at 22:39

## 2023-01-10 RX ADMIN — SUCRALFATE 1 G: 1 TABLET ORAL at 09:06

## 2023-01-10 RX ADMIN — PIPERACILLIN AND TAZOBACTAM 3.38 G: 3; .375 INJECTION, POWDER, FOR SOLUTION INTRAVENOUS at 04:16

## 2023-01-10 RX ADMIN — Medication: at 09:17

## 2023-01-10 RX ADMIN — SUCRALFATE 1 G: 1 TABLET ORAL at 12:37

## 2023-01-10 RX ADMIN — PIPERACILLIN AND TAZOBACTAM 3.38 G: 3; .375 INJECTION, POWDER, FOR SOLUTION INTRAVENOUS at 12:41

## 2023-01-10 NOTE — PROGRESS NOTES
0055: Bedside shift change report given to Donelda Dakin, RN (oncoming nurse) by Reyes Pock, RN (offgoing nurse). Report included the following information SBAR and Kardex. 1900: End of Shift Note    Bedside shift change report given to Reyes Pock, RN (oncoming nurse) by Salbador Jennings RN (offgoing nurse). Report included the following information SBAR and Kardex    Shift worked:  7a-7p     Shift summary and any significant changes:     Patient experienced an episode of N/V administered zofran, patient tolerated well. Concerns for physician to address:       Zone phone for oncoming shift:          Activity:  Activity Level: Bed Rest  Number times ambulated in hallways past shift: 0  Number of times OOB to chair past shift: 0    Cardiac:   Cardiac Monitoring: Yes      Cardiac Rhythm: Sinus Rhythm    Access:  Current line(s): port     Genitourinary:   Urinary status: voiding and pino    Respiratory:   O2 Device: Hi flow nasal cannula, Tracheostomy  Chronic home O2 use?: YES  Incentive spirometer at bedside: NO       GI:  Last Bowel Movement Date: 01/09/23  Current diet:  DIET NPO  Passing flatus: YES  Tolerating current diet: YES       Pain Management:   Patient states pain is manageable on current regimen: YES    Skin:  Edward Score: 13  Interventions: turn team and increase time out of bed    Patient Safety:  Fall Score:  Total Score: 3  Interventions: bed/chair alarm, assistive device (walker, cane, etc), gripper socks, and pt to call before getting OOB  High Fall Risk: Yes    Length of Stay:  Expected LOS: 4d 9h  Actual LOS: 1160 Robert Stapleton RN

## 2023-01-10 NOTE — PROGRESS NOTES
Hospitalist Progress Note    NAME: Antonio Linda   :  1960   MRN:  [id number]       Assessment / Plan:  Acute on chronic hypoxic respiratory failure  Trach dislodgement  Influenza A infection   H/o Head and neck Ca  Was on Hospice care. He revoked hospice upon admission  HARVEY, Cr was 1.1 on 22  Hypernatremia/hyperchloremia/dehydration     Anemia  Hypokalemia, replete with KCl  Aspiration pna with recurrent aspiration  RRT on  for low BP, pt had vomiting episode during RRT and thought top have aspirated. CXR showed increased interstitial lung opacities bilaterally with small right pleural effusion again noted. .    Cont' with IV abx, extend date due to recurrent aspiration  On midodrine tid. Off pressor support   Hold TF for now until O2 requirement improves. Strict NPO  Cont' with Tamiflu, nebs  Wean O2 as tolerated, currently on 50LHF at 35% FIO2  Pt does not wish to be on the ventilator if he fails HF. Repeat CXR  Discussed with pulmonary    Hypernatremia  Likely from NPO status  Cont' low rate d5 gtt      TAMICA:  pending O2 weaning. Currently on 50LHF. Code Status: DNR, was under hospice  Surrogate Decision Maker: Leonardo Price, [phone]. Hospice/[facility] [phone]  DVT Prophylaxis: lovenox   GI Prophylaxis: not indicated  Baseline: PEG/Trach under hosp[facility] care at [facility]     Subjective:     Chief Complaint / Reason for Physician Visit  NAD. Had an episode of vomiting this AM.    Discussed with RN events overnight. Review of Systems:  Symptom Y/N Comments  Symptom Y/N Comments   Fever/Chills    Chest Pain     Poor Appetite    Edema     Cough    Abdominal Pain     Sputum    Joint Pain     SOB/CAI    Pruritis/Rash     Nausea/vomit    Tolerating PT/OT     Diarrhea    Tolerating Diet     Constipation    Other       Could NOT obtain due to:      Objective:     VITALS:   Last 24hrs VS reviewed since prior progress note.  Most recent are:  Patient Vitals for the past 24 hrs:   Temp Pulse Resp BP SpO2   01/10/23 1445 97.8 °F (36.6 °C) 87 18 (!) 92/59 95 %   01/10/23 1400 -- 90 25 (!) 112/45 95 %   01/10/23 1200 98 °F (36.7 °C) 84 23 100/71 92 %   01/10/23 1000 -- 74 14 (!) 104/57 95 %   01/10/23 0906 -- 78 -- (!) 103/58 --   01/10/23 0800 -- 79 -- -- --   01/10/23 0738 -- -- -- -- 92 %   01/10/23 0721 97.9 °F (36.6 °C) 84 16 101/62 93 %   01/10/23 0615 -- 86 -- 96/67 --   01/10/23 0551 -- 85 22 -- 92 %   01/10/23 0022 -- 85 18 -- 91 %   01/09/23 2300 98.2 °F (36.8 °C) 86 21 101/61 94 %   01/09/23 2131 -- 83 -- 100/61 --   01/09/23 2129 -- 88 22 -- 99 %   01/09/23 1900 98.2 °F (36.8 °C) 83 20 108/70 97 %   01/09/23 1746 -- 91 -- (!) 90/59 --   01/09/23 1740 -- (!) 114 (!) 32 -- 95 %   01/09/23 1600 -- 86 -- -- --         Intake/Output Summary (Last 24 hours) at 1/10/2023 1508  Last data filed at 1/10/2023 0280  Gross per 24 hour   Intake 1433.33 ml   Output 800 ml   Net 633.33 ml          I had a face to face encounter and independently examined this patient on 1/10/2023, as outlined below:  PHYSICAL EXAM:  General: Alert, nad, cachetic  EENT:  EOMI. Anicteric sclerae. MMM  Resp:  Coarse,  no wheezing or rales. No accessory muscle use  CV:  Regular  rhythm,  No edema  GI:  Soft, Non distended, Non tender. +PEG  Neurologic:  Alert and oriented X 3, nod to questions  Psych:   Not anxious nor agitated  Skin:  No rashes.   No jaundice    Reviewed most current lab test results and cultures  YES  Reviewed most current radiology test results   YES  Review and summation of old records today    NO  Reviewed patient's current orders and MAR    YES  PMH/SH reviewed - no change compared to H&P  ________________________________________________________________________  Care Plan discussed with:    Comments   Patient x    Family      RN x    Care Manager     Consultant  x Dr Nilda Morales team rounds were held today with , nursing, pharmacist and clinical coordinator. Patient's plan of care was discussed; medications were reviewed and discharge planning was addressed. ________________________________________________________________________  Total NON critical care TIME:  35   Minutes    Total CRITICAL CARE TIME Spent:   Minutes non procedure based      Comments   >50% of visit spent in counseling and coordination of care     ________________________________________________________________________  Dorie Rajput MD     Procedures: see electronic medical records for all procedures/Xrays and details which were not copied into this note but were reviewed prior to creation of Plan. LABS:  I reviewed today's most current labs and imaging studies.   Pertinent labs include:  Recent Labs     01/10/23  0418 01/08/23  0036   WBC 10.6 18.2*   HGB 7.6* 9.0*   HCT 26.6* 30.6*    274       Recent Labs     01/10/23  0418 01/09/23  0142 01/08/23 0036   * 146* 142   K 2.9* 3.4* 2.9*    105 101   CO2 38* 38* 35*   * 113* 156*   BUN 30* 32* 26*   CREA 1.01 1.24 1.30   CA 8.1* 8.4* 7.9*   MG 2.2 2.3 1.5*         Signed: Dorie Rajput MD

## 2023-01-10 NOTE — PROGRESS NOTES
Transition of Care Plan:     RUR: 14%     Disposition:Return to [facility]     Follow up appointments: To be done by facility     DME needed:None     Transportation at Discharge: [facility]     Keys or means to access home:  N/A--Patient returning to [facility].  Medicare Letter:N/A--Patient has [employer] Medicaid     Is patient a [place] and connected with the [place]? No                If yes, was [place] transfer form completed and [facility] notified? N/A     Caregiver Contact:Friend     Discharge Caregiver contacted prior to discharge? To be contacted     Care Conference needed?:   No      Spoke with DON regarding patient. When discharged they are not requiring covid testing and will not require insurance authorization. Jeri Lindsey RN BSN CRM        [phone]

## 2023-01-10 NOTE — PROGRESS NOTES
Problem: Aspiration - Risk of  Goal: *Absence of aspiration  Outcome: Progressing Towards Goal     Problem: Falls - Risk of  Goal: *Absence of Falls  Description: Document Yoselin Fall Risk and appropriate interventions in the flowsheet. Outcome: Progressing Towards Goal  Note: Fall Risk Interventions:            Medication Interventions: Evaluate medications/consider consulting pharmacy    Elimination Interventions: Call light in reach    History of Falls Interventions: Room close to nurse's station         Problem: Pressure Injury - Risk of  Goal: *Prevention of pressure injury  Description: Document Edward Scale and appropriate interventions in the flowsheet.   Outcome: Progressing Towards Goal  Note: Pressure Injury Interventions:  Sensory Interventions: Assess changes in LOC, Minimize linen layers    Moisture Interventions: Absorbent underpads, Maintain skin hydration (lotion/cream), Minimize layers    Activity Interventions: Assess need for specialty bed, Increase time out of bed, PT/OT evaluation    Mobility Interventions: Assess need for specialty bed, PT/OT evaluation    Nutrition Interventions: Document food/fluid/supplement intake, Discuss nutritional consult with provider, Offer support with meals,snacks and hydration    Friction and Shear Interventions: Apply protective barrier, creams and emollients, Minimize layers

## 2023-01-10 NOTE — PROGRESS NOTES
1900: Bedside shift change report given to [facility] (oncoming nurse) by Angelic Cortez RN (offgoing nurse). Report included the following information SBAR, Kardex, Intake/Output, MAR, Recent Results, and Cardiac Rhythm NSR .       2345: Pt found to be covered in blood and holding his IV catheter. Pt bathed and cleaned up.       0700: End of Shift Note    Bedside shift change report given to [address] [facility] (oncoming nurse) by Pham Younger RN (offgoing nurse). Report included the following information SBAR, Kardex, Intake/Output, MAR, Recent Results, and Cardiac Rhythm NSR    Shift worked:  8016-2409     Shift summary and any significant changes:     No significant events. Trach care completed. Concerns for physician to address:  Potassium low this morning, replacement ordered     Zone phone for oncoming shift:          Activity:  Activity Level: Bed Rest  Number times ambulated in hallways past shift: 0  Number of times OOB to chair past shift: 0    Cardiac:   Cardiac Monitoring: Yes      Cardiac Rhythm: Sinus Rhythm    Access:  Current line(s): port     Genitourinary:   Urinary status: pino    Respiratory:   O2 Device: Heated, Hi flow nasal cannula, Tracheostomy  Chronic home O2 use?: YES  Incentive spirometer at bedside: YES       GI:  Last Bowel Movement Date: 01/09/23  Current diet:  DIET NPO Ice Chips, Sips of Water with Meds, Sips of Clear Liquids, Popsicles  Passing flatus: YES  Tolerating current diet: YES       Pain Management:   Patient states pain is manageable on current regimen: YES    Skin:  Edward Score: 13  Interventions: float heels and increase time out of bed    Patient Safety:  Fall Score:  Total Score: 3  Interventions: bed/chair alarm, assistive device (walker, cane, etc), gripper socks, and pt to call before getting OOB  High Fall Risk: Yes    Length of Stay:  Expected LOS: 4d 9h  Actual LOS: [place]        Care Plan    Problem: Aspiration - Risk of  Goal: *Absence of aspiration  Outcome: Progressing Towards Goal     Problem: Risk for Spread of Infection  Goal: Prevent transmission of infectious organism to others  Description: Prevent the transmission of infectious organisms to other patients, staff members, and visitors. Outcome: Progressing Towards Goal     Problem: Falls - Risk of  Goal: *Absence of Falls  Description: Document Michael Rosales Fall Risk and appropriate interventions in the flowsheet. Outcome: Progressing Towards Goal  Note: Fall Risk Interventions:            Medication Interventions: Evaluate medications/consider consulting pharmacy    Elimination Interventions: Call light in reach    History of Falls Interventions: Room close to nurse's station         Problem: Pressure Injury - Risk of  Goal: *Prevention of pressure injury  Description: Document Edward Scale and appropriate interventions in the flowsheet.   Outcome: Progressing Towards Goal  Note: Pressure Injury Interventions:  Sensory Interventions: Assess changes in LOC, Keep linens dry and wrinkle-free, Minimize linen layers    Moisture Interventions: Absorbent underpads, Minimize layers    Activity Interventions: Assess need for specialty bed, PT/OT evaluation    Mobility Interventions: Assess need for specialty bed, PT/OT evaluation    Nutrition Interventions: Document food/fluid/supplement intake, Discuss nutritional consult with provider, Offer support with meals,snacks and hydration    Friction and Shear Interventions: Apply protective barrier, creams and emollients, Minimize layers

## 2023-01-10 NOTE — PROGRESS NOTES
Speech path   This patient has a PEG due to H and N CA and has been NPO at [address] [place] [address]. He needs to be NPO except for ice chips for comfort. We will sign off.   Miriam Dumont, SLP

## 2023-01-10 NOTE — CONSULTS
Pulmonary, Critical Care, and Sleep Medicine    Patient Consult    Name: Mandie Philippe MRN: [id number]   : 1960 Hospital: [facility]   Date: 1/10/2023        IMPRESSION:   Hypoxic respiratory failure on high flow via trach collar. Had another acute aspiration event this am. Vomitus was suctioned from the trach while I was seeing pt. Worsening hypoxia. Pt was semi recumbent. Witnessed aspiration event 23 - on abx coverage for aspiration pneumonia - there is no dense or obvious fluffy infiltrate within the right lung, but there are crackles on the right on exam  Influenza A+ 23, on Tamiflu. COVID negative  Tracheostomy w/ hx of H&N cancer  Hypotension  Hypokalemia, hypomagnesemia  PEG tube for nutrition      RECOMMENDATIONS:   Will extend the Zosyn abx in light or recurrent aspiration and now suctioning vomitus from trach. Will get repeat STAT CXR now. Adjust oxygen to keep pox over 90%. Now strict NPO. Aspiration precautions with HOB always elevated. Recommend strict NPO status until clinical status improves with exception of swabs for comfort. Note intensivist is consulted due to need for norepi hemodynamic support  Prognosis guarded. DNR status. Subjective: This patient has been seen and evaluated at the request of Dr. Lisa Cordero for hypoxia, flu / aspiration and tracheostomy status. Patient is a 57 y.o. male admitted here on  b/c of trach dislodgement. He was hypoxic and diagnosed with Influenza A. He's been on HF O2. During trach care / suctioning yesterday, he vomited and the RT witnessed an aspiration event. He's on Zosyn. He has a PEG tube. He was in hospice care until he was admitted. Hypotension has been an issue and he's on midodrine and norepinephrine gtt. Pressures today ranging  systolic. Lactate has been normal.  WBC has fluctuated, being normal on  and now up to 18.2  Tmax 100.2 in the past 24 hours.    paired blood cultures without growth      He is awake and wants iced tea. Denies chest pain or bothersome cough. ROS is limited to respiratory system due to pt's lack of verbal communication. Past Medical History:   Diagnosis Date    Cancer ([address]     Tongue and lung      Past Surgical History:   Procedure Laterality Date    HX OTHER SURGICAL      HX VASCULAR ACCESS      SD UNLISTED PROCEDURE ABDOMEN PERITONEUM & OMENTUM        Prior to Admission medications    Not on File     No Known Allergies   Social History     Tobacco Use    Smoking status: Not on file    Smokeless tobacco: Not on file   Substance Use Topics    Alcohol use: Not on file      History reviewed. No pertinent family history. Current Facility-Administered Medications   Medication Dose Route Frequency    zinc oxide-cod liver oil (DESITIN) 40 % paste   Topical BID and QHS    sucralfate (CARAFATE) tablet 1 g  1 g Oral AC&HS    NOREPINephrine (LEVOPHED) 8 mg in 5% dextrose 250mL (32 mcg/mL) infusion  0.5-16 mcg/min IntraVENous TITRATE    midodrine (PROAMATINE) tablet 10 mg  10 mg Oral TID    dextrose 5% infusion  50 mL/hr IntraVENous CONTINUOUS    piperacillin-tazobactam (ZOSYN) 3.375 g in 0.9% sodium chloride (MBP/ADV) 100 mL MBP  3.375 g IntraVENous Q8H    sodium chloride (NS) flush 5-40 mL  5-40 mL IntraVENous Q8H    enoxaparin (LOVENOX) injection 40 mg  40 mg SubCUTAneous DAILY       Review of Systems:  Pertinent items are noted in HPI. Objective:   Vital Signs:    Visit Vitals  /71   Pulse 86   Temp 97.9 °F (36.6 °C)   Resp 23   Ht 6' 5\" (1.956 m)   Wt 57.1 kg (125 lb 12.8 oz)   SpO2 92%   BMI 14.92 kg/m²       O2 Device: Hi flow nasal cannula   O2 Flow Rate (L/min): 50 l/min   Temp (24hrs), Av.1 °F (36.7 °C), Min:97.9 °F (36.6 °C), Max:98.2 °F (36.8 °C)       Intake/Output:   Last shift:      No intake/output data recorded.   Last 3 shifts:  1901 - 01/10 0700  In: 1426.9 [I.V.:1356.9]  Out: 1400 [Urine:1400]    Intake/Output Summary (Last 24 hours) at 1/10/2023 1318  Last data filed at 1/10/2023 0641  Gross per 24 hour   Intake 1373.33 ml   Output 800 ml   Net 573.33 ml        Physical Exam:   General:  Cachectic, older man resting in bed in no distress. Seems comfortable. Head:  Normocephalic, without obvious abnormality, atraumatic. Eyes:  Conjunctivae/corneas clear. PERRL, EOMs intact. Nose: Nares normal.        Neck: Supple, symmetrical, tracheostomy with HF O2 entrained. Lungs:   Fine crackles right base. No wheezing or rhonchi elsewhere. Has some decreased BS of the right lung base. Heart:  Regular rate and rhythm, S1, S2 normal, no murmur, click, rub or gallop. Abdomen:   Soft, non-tender. PEG tube   Extremities: Extremities normal, atraumatic, no cyanosis or edema. Skin: No rashes or lesions       Neurologic: Grossly nonfocal. He writes to communicate. Data review:     Recent Results (from the past 24 hour(s))   CBC WITH AUTOMATED DIFF    Collection Time: 01/10/23  4:18 AM   Result Value Ref Range    WBC 10.6 4.1 - 11.1 K/uL    RBC 2.93 (L) 4.10 - 5.70 M/uL    HGB 7.6 (L) 12.1 - 17.0 g/dL    HCT 26.6 (L) 36.6 - 50.3 %    MCV 90.8 80.0 - 99.0 FL    MCH 25.9 (L) 26.0 - 34.0 PG    MCHC 28.6 (L) 30.0 - 36.5 g/dL    RDW 18.2 (H) 11.5 - 14.5 %    PLATELET 202 334 - 749 K/uL    MPV 11.4 8.9 - 12.9 FL    NRBC 0.0 0  WBC    ABSOLUTE NRBC 0.00 0.00 - 0.01 K/uL    NEUTROPHILS 88 (H) 32 - 75 %    LYMPHOCYTES 5 (L) 12 - 49 %    MONOCYTES 6 5 - 13 %    EOSINOPHILS 0 0 - 7 %    BASOPHILS 0 0 - 1 %    IMMATURE GRANULOCYTES 1 (H) 0.0 - 0.5 %    ABS. NEUTROPHILS 9.4 (H) 1.8 - 8.0 K/UL    ABS. LYMPHOCYTES 0.5 (L) 0.8 - 3.5 K/UL    ABS. MONOCYTES 0.6 0.0 - 1.0 K/UL    ABS. EOSINOPHILS 0.0 0.0 - 0.4 K/UL    ABS. BASOPHILS 0.0 0.0 - 0.1 K/UL    ABS. IMM.  GRANS. 0.1 (H) 0.00 - 0.04 K/UL    DF AUTOMATED      RBC COMMENTS NORMOCYTIC, NORMOCHROMIC     METABOLIC PANEL, BASIC    Collection Time: 01/10/23  4:18 AM   Result Value Ref Range    Sodium 146 (H) 136 - 145 mmol/L    Potassium 2.9 (L) 3.5 - 5.1 mmol/L    Chloride 105 97 - 108 mmol/L    CO2 38 (H) 21 - 32 mmol/L    Anion gap 3 (L) 5 - 15 mmol/L    Glucose 111 (H) 65 - 100 mg/dL    BUN 30 (H) 6 - 20 MG/DL    Creatinine 1.01 0.70 - 1.30 MG/DL    BUN/Creatinine ratio 30 (H) 12 - 20      eGFR >60 >60 ml/min/1.73m2    Calcium 8.1 (L) 8.5 - 10.1 MG/DL   MAGNESIUM    Collection Time: 01/10/23  4:18 AM   Result Value Ref Range    Magnesium 2.2 1.6 - 2.4 mg/dL       Imaging:  I have personally reviewed the patients radiographs and have reviewed the reports:  CXR 1-9-2023:  with diffuse bilateral interstitial opacities. Increasing Right effusion.          Heydi Pretty MD

## 2023-01-11 ENCOUNTER — APPOINTMENT (OUTPATIENT)
Dept: GENERAL RADIOLOGY | Age: 63
DRG: 143 | End: 2023-01-11
Attending: INTERNAL MEDICINE
Payer: MEDICAID

## 2023-01-11 LAB
ANION GAP SERPL CALC-SCNC: 3 MMOL/L (ref 5–15)
BASOPHILS # BLD: 0 K/UL (ref 0–0.1)
BASOPHILS NFR BLD: 0 % (ref 0–1)
BUN SERPL-MCNC: 26 MG/DL (ref 6–20)
BUN/CREAT SERPL: 25 (ref 12–20)
CALCIUM SERPL-MCNC: 8 MG/DL (ref 8.5–10.1)
CHLORIDE SERPL-SCNC: 104 MMOL/L (ref 97–108)
CO2 SERPL-SCNC: 36 MMOL/L (ref 21–32)
CREAT SERPL-MCNC: 1.03 MG/DL (ref 0.7–1.3)
DIFFERENTIAL METHOD BLD: ABNORMAL
EOSINOPHIL # BLD: 0 K/UL (ref 0–0.4)
EOSINOPHIL NFR BLD: 0 % (ref 0–7)
ERYTHROCYTE [DISTWIDTH] IN BLOOD BY AUTOMATED COUNT: 18.6 % (ref 11.5–14.5)
GLUCOSE SERPL-MCNC: 100 MG/DL (ref 65–100)
HCT VFR BLD AUTO: 23.9 % (ref 36.6–50.3)
HCT VFR BLD AUTO: 25.8 % (ref 36.6–50.3)
HEMOCCULT STL QL: POSITIVE
HGB BLD-MCNC: 7 G/DL (ref 12.1–17)
HGB BLD-MCNC: 7.4 G/DL (ref 12.1–17)
IMM GRANULOCYTES # BLD AUTO: 0.1 K/UL (ref 0–0.04)
IMM GRANULOCYTES NFR BLD AUTO: 1 % (ref 0–0.5)
LYMPHOCYTES # BLD: 0.4 K/UL (ref 0.8–3.5)
LYMPHOCYTES NFR BLD: 5 % (ref 12–49)
MCH RBC QN AUTO: 26.1 PG (ref 26–34)
MCHC RBC AUTO-ENTMCNC: 28.7 G/DL (ref 30–36.5)
MCV RBC AUTO: 91.2 FL (ref 80–99)
MONOCYTES # BLD: 0.6 K/UL (ref 0–1)
MONOCYTES NFR BLD: 7 % (ref 5–13)
NEUTS SEG # BLD: 7.3 K/UL (ref 1.8–8)
NEUTS SEG NFR BLD: 87 % (ref 32–75)
NRBC # BLD: 0 K/UL (ref 0–0.01)
NRBC BLD-RTO: 0 PER 100 WBC
PLATELET # BLD AUTO: 160 K/UL (ref 150–400)
PMV BLD AUTO: 12.2 FL (ref 8.9–12.9)
POTASSIUM SERPL-SCNC: 3.6 MMOL/L (ref 3.5–5.1)
RBC # BLD AUTO: 2.83 M/UL (ref 4.1–5.7)
SODIUM SERPL-SCNC: 143 MMOL/L (ref 136–145)
WBC # BLD AUTO: 8.4 K/UL (ref 4.1–11.1)

## 2023-01-11 PROCEDURE — 74011250637 HC RX REV CODE- 250/637: Performed by: PHYSICIAN ASSISTANT

## 2023-01-11 PROCEDURE — 74011250637 HC RX REV CODE- 250/637: Performed by: GENERAL ACUTE CARE HOSPITAL

## 2023-01-11 PROCEDURE — 77010033711 HC HIGH FLOW OXYGEN

## 2023-01-11 PROCEDURE — 74011000258 HC RX REV CODE- 258: Performed by: INTERNAL MEDICINE

## 2023-01-11 PROCEDURE — 36415 COLL VENOUS BLD VENIPUNCTURE: CPT

## 2023-01-11 PROCEDURE — 80048 BASIC METABOLIC PNL TOTAL CA: CPT

## 2023-01-11 PROCEDURE — 74011250636 HC RX REV CODE- 250/636: Performed by: GENERAL ACUTE CARE HOSPITAL

## 2023-01-11 PROCEDURE — 82272 OCCULT BLD FECES 1-3 TESTS: CPT

## 2023-01-11 PROCEDURE — 71045 X-RAY EXAM CHEST 1 VIEW: CPT

## 2023-01-11 PROCEDURE — 85018 HEMOGLOBIN: CPT

## 2023-01-11 PROCEDURE — 74011000250 HC RX REV CODE- 250: Performed by: GENERAL ACUTE CARE HOSPITAL

## 2023-01-11 PROCEDURE — 74011250636 HC RX REV CODE- 250/636: Performed by: INTERNAL MEDICINE

## 2023-01-11 PROCEDURE — 74011250636 HC RX REV CODE- 250/636: Performed by: NURSE PRACTITIONER

## 2023-01-11 PROCEDURE — 65270000046 HC RM TELEMETRY

## 2023-01-11 PROCEDURE — 85025 COMPLETE CBC W/AUTO DIFF WBC: CPT

## 2023-01-11 PROCEDURE — 74011250637 HC RX REV CODE- 250/637: Performed by: INTERNAL MEDICINE

## 2023-01-11 RX ADMIN — MORPHINE SULFATE 2 MG: 2 INJECTION, SOLUTION INTRAMUSCULAR; INTRAVENOUS at 04:40

## 2023-01-11 RX ADMIN — SUCRALFATE 1 G: 1 TABLET ORAL at 17:31

## 2023-01-11 RX ADMIN — PROCHLORPERAZINE EDISYLATE 5 MG: 5 INJECTION INTRAMUSCULAR; INTRAVENOUS at 04:40

## 2023-01-11 RX ADMIN — SUCRALFATE 1 G: 1 TABLET ORAL at 12:59

## 2023-01-11 RX ADMIN — ACETAMINOPHEN 650 MG: 325 TABLET ORAL at 21:53

## 2023-01-11 RX ADMIN — DEXTROSE MONOHYDRATE 50 ML/HR: 50 INJECTION, SOLUTION INTRAVENOUS at 13:00

## 2023-01-11 RX ADMIN — SUCRALFATE 1 G: 1 TABLET ORAL at 21:53

## 2023-01-11 RX ADMIN — ENOXAPARIN SODIUM 40 MG: 100 INJECTION SUBCUTANEOUS at 09:17

## 2023-01-11 RX ADMIN — OXYCODONE 5 MG: 5 TABLET ORAL at 12:59

## 2023-01-11 RX ADMIN — MORPHINE SULFATE 2 MG: 2 INJECTION, SOLUTION INTRAMUSCULAR; INTRAVENOUS at 02:36

## 2023-01-11 RX ADMIN — PIPERACILLIN AND TAZOBACTAM 3.38 G: 3; .375 INJECTION, POWDER, FOR SOLUTION INTRAVENOUS at 13:00

## 2023-01-11 RX ADMIN — SODIUM CHLORIDE, PRESERVATIVE FREE 10 ML: 5 INJECTION INTRAVENOUS at 04:40

## 2023-01-11 RX ADMIN — SODIUM CHLORIDE, PRESERVATIVE FREE 10 ML: 5 INJECTION INTRAVENOUS at 21:54

## 2023-01-11 RX ADMIN — ONDANSETRON 4 MG: 2 INJECTION INTRAMUSCULAR; INTRAVENOUS at 02:36

## 2023-01-11 RX ADMIN — MIDODRINE HYDROCHLORIDE 10 MG: 5 TABLET ORAL at 21:53

## 2023-01-11 RX ADMIN — Medication: at 17:31

## 2023-01-11 RX ADMIN — SUCRALFATE 1 G: 1 TABLET ORAL at 09:17

## 2023-01-11 RX ADMIN — OXYCODONE 5 MG: 5 TABLET ORAL at 19:34

## 2023-01-11 RX ADMIN — MIDODRINE HYDROCHLORIDE 10 MG: 5 TABLET ORAL at 17:31

## 2023-01-11 RX ADMIN — PIPERACILLIN AND TAZOBACTAM 3.38 G: 3; .375 INJECTION, POWDER, FOR SOLUTION INTRAVENOUS at 04:40

## 2023-01-11 RX ADMIN — Medication: at 21:54

## 2023-01-11 RX ADMIN — SODIUM CHLORIDE, PRESERVATIVE FREE 10 ML: 5 INJECTION INTRAVENOUS at 13:03

## 2023-01-11 RX ADMIN — Medication: at 09:27

## 2023-01-11 RX ADMIN — MIDODRINE HYDROCHLORIDE 10 MG: 5 TABLET ORAL at 09:17

## 2023-01-11 NOTE — PROGRESS NOTES
1900: Bedside shift change report given to [facility] (oncoming nurse) by Jeannie Gonzalez RN (offgoing nurse). Report included the following information SBAR, Kardex, Intake/Output, MAR, Recent Results, and Cardiac Rhythm NSR .       2003: PRN morphine and zofran given    2307: Pt still vomiting. [place] notified and compazine ordered and given. 0236: PRN morphine and zofran given    0440: PRN morphine and compazine given    0530: Pt having dark loose stools and noticed that hgb dropped almost 2 in 24 hour period. Occult stool ordered. 0700: End of Shift Note    Bedside shift change report given to [address] [facility] (oncoming nurse) by Ramsey Galicia RN (offgoing nurse). Report included the following information SBAR, Kardex, Intake/Output, MAR, Recent Results, and Cardiac Rhythm NSR    Shift worked:  7612-5804     Shift summary and any significant changes:     See above notes. Concerns for physician to address:  Dark stools and hgb drop     Zone phone for oncoming shift:          Activity:  Activity Level: Bed Rest  Number times ambulated in hallways past shift: 0  Number of times OOB to chair past shift: 0    Cardiac:   Cardiac Monitoring: Yes      Cardiac Rhythm: Sinus Rhythm    Access:  Current line(s): port     Genitourinary:   Urinary status: pino    Respiratory:   O2 Device: Heated, Hi flow nasal cannula, Tracheostomy  Chronic home O2 use?: YES  Incentive spirometer at bedside: NO       GI:  Last Bowel Movement Date: 01/09/23  Current diet:  DIET NPO  Passing flatus: YES  Tolerating current diet: YES       Pain Management:   Patient states pain is manageable on current regimen: YES    Skin:  Edward Score: 13  Interventions: float heels and increase time out of bed    Patient Safety:  Fall Score:  Total Score: 3  Interventions: bed/chair alarm, assistive device (walker, cane, etc), gripper socks, and pt to call before getting OOB  High Fall Risk: Yes    Length of Stay:  Expected LOS: 4d 9h  Actual LOS: [address] [facility] Catherine Agrawal

## 2023-01-11 NOTE — PROGRESS NOTES
Received notification from bedside RN about patient with regards to: persistent nausea and vomiting not adequately relieved by Zofran, requesting additional medication for relief  VS: BP 95/65, HR 78, RR 22, O2 sat 96% on HHF  40 L    Intervention given: Compazine to alternate with Zofran IV PRN ordered    0530: Notified of several episode of dark, loose stool this AM  VS: /73, HR 85, RR 16, O2 sat 99% on HHF 40 L    - stool for occult blood, CBC for tomorrow AM ordered

## 2023-01-11 NOTE — PROGRESS NOTES
Problem: Aspiration - Risk of  Goal: *Absence of aspiration  Outcome: Progressing Towards Goal     Problem: Risk for Spread of Infection  Goal: Prevent transmission of infectious organism to others  Description: Prevent the transmission of infectious organisms to other patients, staff members, and visitors. Outcome: Progressing Towards Goal     Problem: Falls - Risk of  Goal: *Absence of Falls  Description: Document Duke Levels Fall Risk and appropriate interventions in the flowsheet. Outcome: Progressing Towards Goal  Note: Fall Risk Interventions:            Medication Interventions: Evaluate medications/consider consulting pharmacy    Elimination Interventions: Call light in reach    History of Falls Interventions: Room close to nurse's station         Problem: Pressure Injury - Risk of  Goal: *Prevention of pressure injury  Description: Document Edward Scale and appropriate interventions in the flowsheet.   Outcome: Progressing Towards Goal  Note: Pressure Injury Interventions:  Sensory Interventions: Assess changes in LOC, Check visual cues for pain, Minimize linen layers, Monitor skin under medical devices    Moisture Interventions: Absorbent underpads, Minimize layers    Activity Interventions: Assess need for specialty bed, Increase time out of bed    Mobility Interventions: Assess need for specialty bed, Float heels, PT/OT evaluation    Nutrition Interventions: Document food/fluid/supplement intake, Discuss nutritional consult with provider, Offer support with meals,snacks and hydration    Friction and Shear Interventions: Apply protective barrier, creams and emollients, Minimize layers

## 2023-01-11 NOTE — PROGRESS NOTES
Hospitalist Progress Note    NAME: Zo Werner   :  1960   MRN:  [id number]       Assessment / Plan:  Acute on chronic hypoxic respiratory failure  Trach dislodgement  Influenza A infection   H/o Head and neck Ca  Was on Hospice care. He revoked hospice upon admission  HARVEY, Cr was 1.1 on 22  Hypernatremia/hyperchloremia/dehydration       Anemia  Due to blood loss  Repeat hemoglobin 7.4  H&H every 12 hour    Hypokalemia, replete with KCl  Aspiration pna with recurrent aspiration  RRT on  for low BP, pt had vomiting episode during RRT and thought top have aspirated. CXR showed increased interstitial lung opacities bilaterally with small right pleural effusion again noted. .    Cont' with IV abx, extend date due to recurrent aspiration  On midodrine tid. Off pressor support   Hold TF for now until O2 requirement improves. Strict NPO  Cont' with Tamiflu, nebs  Wean O2 as tolerated, currently on 50LHF at 35% FIO2  Pt does not wish to be on the ventilator if he fails HF. Repeat CXR  Discussed with pulmonary    Hypernatremia  Likely from NPO status  Cont' low rate d5 gtt      TAMICA:  pending O2 weaning. Currently on 50LHF. Code Status: DNR, was under hospice  Surrogate Decision Maker: Tru Lay, [phone]. Hospice/[facility] [phone]  DVT Prophylaxis: lovenox   GI Prophylaxis: not indicated  Baseline: PEG/Trach under hospice care at [facility]     Subjective:     Chief Complaint / Reason for Physician Visit  Patient seen and examined this afternoon still on high flow nasal cannula  Discussed with RN events overnight.      Review of Systems:  Symptom Y/N Comments  Symptom Y/N Comments   Fever/Chills    Chest Pain     Poor Appetite    Edema     Cough    Abdominal Pain     Sputum    Joint Pain     SOB/CAI    Pruritis/Rash     Nausea/vomit    Tolerating PT/OT     Diarrhea    Tolerating Diet     Constipation    Other       Could NOT obtain due to:      Objective:     VITALS: Last 24hrs VS reviewed since prior progress note. Most recent are:  Patient Vitals for the past 24 hrs:   Temp Pulse Resp BP SpO2   01/11/23 1731 -- 83 -- (!) 94/58 --   01/11/23 1600 -- 84 -- -- --   01/11/23 1200 -- 85 -- -- --   01/11/23 1056 97.7 °F (36.5 °C) 84 15 107/63 99 %   01/11/23 0917 -- 85 -- (!) 91/56 --   01/11/23 0800 -- 80 -- -- --   01/11/23 0733 97.9 °F (36.6 °C) 77 16 (!) 88/58 98 %   01/11/23 0724 -- -- -- -- 98 %   01/11/23 0400 -- 85 16 109/73 99 %   01/11/23 0333 -- -- -- -- 100 %   01/11/23 0000 -- 80 20 95/63 100 %   01/10/23 2239 -- 78 22 95/65 96 %   01/10/23 2025 -- -- -- -- 100 %   01/10/23 2020 -- -- -- -- 100 %   01/10/23 2000 98 °F (36.7 °C) 72 14 106/66 98 %   01/10/23 1744 -- 78 -- 95/60 --         Intake/Output Summary (Last 24 hours) at 1/11/2023 1739  Last data filed at 1/11/2023 0440  Gross per 24 hour   Intake 1786.67 ml   Output 800 ml   Net 986.67 ml          I had a face to face encounter and independently examined this patient on 1/11/2023, as outlined below:  PHYSICAL EXAM:  General: Alert, nad, cachetic  EENT:  EOMI. Anicteric sclerae. MMM  Resp:  Coarse,  no wheezing or rales. No accessory muscle use  CV:  Regular  rhythm,  No edema  GI:  Soft, Non distended, Non tender. +PEG  Neurologic:  Alert and oriented X 3, nod to questions  Psych:   Not anxious nor agitated  Skin:  No rashes. No jaundice    Reviewed most current lab test results and cultures  YES  Reviewed most current radiology test results   YES  Review and summation of old records today    NO  Reviewed patient's current orders and MAR    YES  PMH/SH reviewed - no change compared to H&P  ________________________________________________________________________  Care Plan discussed with:    Comments   Patient x    Family      RN x    Care Manager     Consultant  x Dr Fidel Parsons team rounds were held today with , nursing, pharmacist and clinical coordinator. Patient's plan of care was discussed; medications were reviewed and discharge planning was addressed. ________________________________________________________________________  Total NON critical care TIME:  35   Minutes    Total CRITICAL CARE TIME Spent:   Minutes non procedure based      Comments   >50% of visit spent in counseling and coordination of care     ________________________________________________________________________  Austin Tobar MD     Procedures: see electronic medical records for all procedures/Xrays and details which were not copied into this note but were reviewed prior to creation of Plan. LABS:  I reviewed today's most current labs and imaging studies. Pertinent labs include:  Recent Labs     01/11/23  0442 01/10/23  0418   WBC 8.4 10.6   HGB 7.4* 7.6*   HCT 25.8* 26.6*    160       Recent Labs     01/11/23  0442 01/10/23  0418 01/09/23  0142    146* 146*   K 3.6 2.9* 3.4*    105 105   CO2 36* 38* 38*    111* 113*   BUN 26* 30* 32*   CREA 1.03 1.01 1.24   CA 8.0* 8.1* 8.4*   MG  --  2.2 2.3         Signed:  Austin Tobar MD

## 2023-01-11 NOTE — PROGRESS NOTES
Physical Therapy Screening:  Services are not indicated at this time. [facility] based on results obtained during the nursing admission assessment. The patients chart was reviewed and the patient is not appropriate for a skilled therapy evaluation at this time. Please consult physical therapy if any therapy needs arise. Thank you.     Tyree Jacinto, PT, DPT

## 2023-01-11 NOTE — PROGRESS NOTES
1750: Bedside shift change report given to Kyler Eason RN (oncoming nurse) by Felicita Juan RN (offgoing nurse). Report included the following information SBAR and Kardex. 1900: End of Shift Note    Bedside shift change report given to Meghan Gibson RN (oncoming nurse) by Fátima Cardona RN (offgoing nurse). Report included the following information SBAR, Kardex, and MAR    Shift worked:  7a-7p     Shift summary and any significant changes:     * see chart     Concerns for physician to address:       Zone phone for oncoming shift:          Activity:  Activity Level: Bed Rest  Number times ambulated in hallways past shift: 0  Number of times OOB to chair past shift: 0    Cardiac:   Cardiac Monitoring: Yes      Cardiac Rhythm: Sinus Rhythm    Access:  Current line(s): PIV and port     Genitourinary:   Urinary status: pino    Respiratory:   O2 Device: Heated, Hi flow nasal cannula  Chronic home O2 use?: YES  Incentive spirometer at bedside: YES       GI:  Last Bowel Movement Date: 01/11/23  Current diet:  DIET NPO  Passing flatus: YES  Tolerating current diet: YES       Pain Management:   Patient states pain is manageable on current regimen: YES    Skin:  Edward Score: 13  Interventions: float heels and increase time out of bed    Patient Safety:  Fall Score:  Total Score: 3  Interventions: bed/chair alarm, assistive device (walker, cane, etc), gripper socks, and pt to call before getting OOB  High Fall Risk: Yes    Length of Stay:  Expected LOS: 4d 9h  Actual LOS: [address] RN

## 2023-01-11 NOTE — CONSULTS
Pulmonary, Critical Care, and Sleep Medicine    Patient Consult    Name: Surinder Asher MRN: [id number]   : 1960 Hospital: [facility]   Date: 2023        IMPRESSION:   Hypoxic respiratory failure on high flow via trach collar. Still on trach collar oxygen. Had another acute aspiration on 1/10/23. Witnessed aspiration event 23 - on abx coverage for aspiration pneumonia - there is no dense or obvious fluffy infiltrate within the right lung, but there are crackles on the right on exam  Influenza A+ 23, on Tamiflu. COVID negative  Tracheostomy w/ hx of H&N cancer  Hypotension-now better. Hypokalemia, hypomagnesemia  PEG tube for nutrition  Prognosis guarded. DNR status. RECOMMENDATIONS:   Will extend the Zosyn abx in light or recurrent aspiration and now suctioning vomitus from trach. Adjust oxygen to keep pox over 90%. Wean oxygen as able. Aspiration precautions with HOB always elevated. Recommend strict NPO status until clinical status improves with exception of swabs for comfort. Will follow. Subjective:       Pt has been feeling pretty well. Has trach in place. Still some dark secretions. Still on high level of oxygen. This patient has been seen and evaluated at the request of Dr. Catarino Ruvalcaba for hypoxia, flu / aspiration and tracheostomy status. Patient is a 57 y.o. male admitted here on  b/c of trach dislodgement. He was hypoxic and diagnosed with Influenza A. He's been on HF O2. During trach care / suctioning yesterday, he vomited and the RT witnessed an aspiration event. He's on Zosyn. He has a PEG tube. He was in hospice care until he was admitted. Hypotension has been an issue and he's on midodrine and norepinephrine gtt. Pressures today ranging  systolic. Lactate has been normal.  WBC has fluctuated, being normal on  and now up to 18.2  Tmax 100.2 in the past 24 hours.    paired blood cultures without growth      He is awake and wants iced tea. Denies chest pain or bothersome cough. ROS is limited to respiratory system due to pt's lack of verbal communication. Past Medical History:   Diagnosis Date    Cancer ([address]     Tongue and lung      Past Surgical History:   Procedure Laterality Date    HX OTHER SURGICAL      HX VASCULAR ACCESS      NE UNLISTED PROCEDURE ABDOMEN PERITONEUM & OMENTUM        Prior to Admission medications    Not on File     No Known Allergies   Social History     Tobacco Use    Smoking status: Not on file    Smokeless tobacco: Not on file   Substance Use Topics    Alcohol use: Not on file      History reviewed. No pertinent family history. Current Facility-Administered Medications   Medication Dose Route Frequency    piperacillin-tazobactam (ZOSYN) 3.375 g in 0.9% sodium chloride (MBP/ADV) 100 mL MBP  3.375 g IntraVENous Q8H    zinc oxide-cod liver oil (DESITIN) 40 % paste   Topical BID and QHS    sucralfate (CARAFATE) tablet 1 g  1 g Oral AC&HS    midodrine (PROAMATINE) tablet 10 mg  10 mg Oral TID    dextrose 5% infusion  50 mL/hr IntraVENous CONTINUOUS    sodium chloride (NS) flush 5-40 mL  5-40 mL IntraVENous Q8H    enoxaparin (LOVENOX) injection 40 mg  40 mg SubCUTAneous DAILY       Review of Systems:  Pertinent items are noted in HPI. Objective:   Vital Signs:    Visit Vitals  /63 (BP 1 Location: Right upper arm, BP Patient Position: At rest)   Pulse 84   Temp 97.7 °F (36.5 °C)   Resp 15   Ht 6' 5\" (1.956 m)   Wt 57.1 kg (125 lb 12.8 oz)   SpO2 99%   BMI 14.92 kg/m²       O2 Device: Heated, Hi flow nasal cannula   O2 Flow Rate (L/min): (S) 35 l/min   Temp (24hrs), Av.9 °F (36.6 °C), Min:97.7 °F (36.5 °C), Max:98 °F (36.7 °C)       Intake/Output:   Last shift:      No intake/output data recorded.   Last 3 shifts:  1901 -  0700  In: 3220 [I.V.:3130]  Out: 1600 [Urine:1600]    Intake/Output Summary (Last 24 hours) at 2023 1302  Last data filed at 2023 0440  Gross per  hour   Intake 1786.67 ml   Output 800 ml   Net 986.67 ml        Physical Exam:   General:  Cachectic, older man resting in bed in no distress. Seems comfortable. Head:  Normocephalic, without obvious abnormality, atraumatic. Eyes:  Conjunctivae/corneas clear. PERRL, EOMs intact. Nose: Nares normal.        Neck: Supple, symmetrical, tracheostomy with HF O2 entrained. Lungs:   Fine crackles right base. No wheezing or rhonchi elsewhere. Has some decreased BS of the right lung base. Heart:  Regular rate and rhythm, S1, S2 normal, no murmur, click, rub or gallop. Abdomen:   Soft, non-tender. PEG tube   Extremities: Extremities normal, atraumatic, no cyanosis or edema. Skin: No rashes or lesions       Neurologic: Grossly nonfocal. He writes to communicate. Data review:     Recent Results (from the past 24 hour(s))   METABOLIC PANEL, BASIC    Collection Time: 01/11/23  4:42 AM   Result Value Ref Range    Sodium 143 136 - 145 mmol/L    Potassium 3.6 3.5 - 5.1 mmol/L    Chloride 104 97 - 108 mmol/L    CO2 36 (H) 21 - 32 mmol/L    Anion gap 3 (L) 5 - 15 mmol/L    Glucose 100 65 - 100 mg/dL    BUN 26 (H) 6 - 20 MG/DL    Creatinine 1.03 0.70 - 1.30 MG/DL    BUN/Creatinine ratio 25 (H) 12 - 20      eGFR >60 >60 ml/min/1.73m2    Calcium 8.0 (L) 8.5 - 10.1 MG/DL   CBC WITH AUTOMATED DIFF    Collection Time: 01/11/23  4:42 AM   Result Value Ref Range    WBC 8.4 4.1 - 11.1 K/uL    RBC 2.83 (L) 4.10 - 5.70 M/uL    HGB 7.4 (L) 12.1 - 17.0 g/dL    HCT 25.8 (L) 36.6 - 50.3 %    MCV 91.2 80.0 - 99.0 FL    MCH 26.1 26.0 - 34.0 PG    MCHC 28.7 (L) 30.0 - 36.5 g/dL    RDW 18.6 (H) 11.5 - 14.5 %    PLATELET 346 829 - 901 K/uL    MPV 12.2 8.9 - 12.9 FL    NRBC 0.0 0  WBC    ABSOLUTE NRBC 0.00 0.00 - 0.01 K/uL    NEUTROPHILS 87 (H) 32 - 75 %    LYMPHOCYTES 5 (L) 12 - 49 %    MONOCYTES 7 5 - 13 %    EOSINOPHILS 0 0 - 7 %    BASOPHILS 0 0 - 1 %    IMMATURE GRANULOCYTES 1 (H) 0.0 - 0.5 %    ABS.  NEUTROPHILS 7. 3 1.8 - 8.0 K/UL    ABS. LYMPHOCYTES 0.4 (L) 0.8 - 3.5 K/UL    ABS. MONOCYTES 0.6 0.0 - 1.0 K/UL    ABS. EOSINOPHILS 0.0 0.0 - 0.4 K/UL    ABS. BASOPHILS 0.0 0.0 - 0.1 K/UL    ABS. IMM. GRANS. 0.1 (H) 0.00 - 0.04 K/UL    DF AUTOMATED     OCCULT BLOOD, STOOL    Collection Time: 01/11/23  7:26 AM   Result Value Ref Range    Occult blood, stool Positive (A) NEG         Imaging:  I have personally reviewed the patients radiographs and have reviewed the reports:  CXR 1-9-2023:  with diffuse bilateral interstitial opacities. Increasing Right effusion.          Adams Kirk MD

## 2023-01-12 LAB
ALBUMIN SERPL-MCNC: 2.3 G/DL (ref 3.5–5)
ALBUMIN/GLOB SERPL: 0.8 (ref 1.1–2.2)
ALP SERPL-CCNC: 100 U/L (ref 45–117)
ALT SERPL-CCNC: 14 U/L (ref 12–78)
ANION GAP SERPL CALC-SCNC: 3 MMOL/L (ref 5–15)
AST SERPL-CCNC: 24 U/L (ref 15–37)
BILIRUB SERPL-MCNC: 0.7 MG/DL (ref 0.2–1)
BUN SERPL-MCNC: 21 MG/DL (ref 6–20)
BUN/CREAT SERPL: 21 (ref 12–20)
CALCIUM SERPL-MCNC: 7.9 MG/DL (ref 8.5–10.1)
CHLORIDE SERPL-SCNC: 102 MMOL/L (ref 97–108)
CO2 SERPL-SCNC: 34 MMOL/L (ref 21–32)
CREAT SERPL-MCNC: 0.98 MG/DL (ref 0.7–1.3)
ERYTHROCYTE [DISTWIDTH] IN BLOOD BY AUTOMATED COUNT: 18.4 % (ref 11.5–14.5)
GLOBULIN SER CALC-MCNC: 3 G/DL (ref 2–4)
GLUCOSE SERPL-MCNC: 89 MG/DL (ref 65–100)
HCT VFR BLD AUTO: 24.8 % (ref 36.6–50.3)
HGB BLD-MCNC: 7.2 G/DL (ref 12.1–17)
MAGNESIUM SERPL-MCNC: 2 MG/DL (ref 1.6–2.4)
MCH RBC QN AUTO: 25.8 PG (ref 26–34)
MCHC RBC AUTO-ENTMCNC: 29 G/DL (ref 30–36.5)
MCV RBC AUTO: 88.9 FL (ref 80–99)
NRBC # BLD: 0 K/UL (ref 0–0.01)
NRBC BLD-RTO: 0 PER 100 WBC
PHOSPHATE SERPL-MCNC: 1.6 MG/DL (ref 2.6–4.7)
PLATELET # BLD AUTO: 193 K/UL (ref 150–400)
PMV BLD AUTO: 12.1 FL (ref 8.9–12.9)
POTASSIUM SERPL-SCNC: 3.4 MMOL/L (ref 3.5–5.1)
PROT SERPL-MCNC: 5.3 G/DL (ref 6.4–8.2)
RBC # BLD AUTO: 2.79 M/UL (ref 4.1–5.7)
SODIUM SERPL-SCNC: 139 MMOL/L (ref 136–145)
WBC # BLD AUTO: 9.2 K/UL (ref 4.1–11.1)

## 2023-01-12 PROCEDURE — 74011250637 HC RX REV CODE- 250/637: Performed by: PHYSICIAN ASSISTANT

## 2023-01-12 PROCEDURE — 84100 ASSAY OF PHOSPHORUS: CPT

## 2023-01-12 PROCEDURE — 77010033711 HC HIGH FLOW OXYGEN

## 2023-01-12 PROCEDURE — 74011250636 HC RX REV CODE- 250/636: Performed by: GENERAL ACUTE CARE HOSPITAL

## 2023-01-12 PROCEDURE — 74011000258 HC RX REV CODE- 258: Performed by: STUDENT IN AN ORGANIZED HEALTH CARE EDUCATION/TRAINING PROGRAM

## 2023-01-12 PROCEDURE — 74011250636 HC RX REV CODE- 250/636: Performed by: STUDENT IN AN ORGANIZED HEALTH CARE EDUCATION/TRAINING PROGRAM

## 2023-01-12 PROCEDURE — 74011250636 HC RX REV CODE- 250/636: Performed by: INTERNAL MEDICINE

## 2023-01-12 PROCEDURE — 83735 ASSAY OF MAGNESIUM: CPT

## 2023-01-12 PROCEDURE — 74011250637 HC RX REV CODE- 250/637: Performed by: INTERNAL MEDICINE

## 2023-01-12 PROCEDURE — 36415 COLL VENOUS BLD VENIPUNCTURE: CPT

## 2023-01-12 PROCEDURE — 74011250636 HC RX REV CODE- 250/636: Performed by: NURSE PRACTITIONER

## 2023-01-12 PROCEDURE — 85027 COMPLETE CBC AUTOMATED: CPT

## 2023-01-12 PROCEDURE — 80053 COMPREHEN METABOLIC PANEL: CPT

## 2023-01-12 PROCEDURE — 65270000046 HC RM TELEMETRY

## 2023-01-12 PROCEDURE — 74011000250 HC RX REV CODE- 250: Performed by: GENERAL ACUTE CARE HOSPITAL

## 2023-01-12 RX ORDER — POTASSIUM CHLORIDE 7.45 MG/ML
10 INJECTION INTRAVENOUS
Status: COMPLETED | OUTPATIENT
Start: 2023-01-12 | End: 2023-01-12

## 2023-01-12 RX ADMIN — PIPERACILLIN AND TAZOBACTAM 3.38 G: 3; .375 INJECTION, POWDER, FOR SOLUTION INTRAVENOUS at 09:56

## 2023-01-12 RX ADMIN — Medication: at 10:48

## 2023-01-12 RX ADMIN — SUCRALFATE 1 G: 1 TABLET ORAL at 10:47

## 2023-01-12 RX ADMIN — POTASSIUM CHLORIDE 10 MEQ: 7.46 INJECTION, SOLUTION INTRAVENOUS at 15:15

## 2023-01-12 RX ADMIN — MIDODRINE HYDROCHLORIDE 10 MG: 5 TABLET ORAL at 21:40

## 2023-01-12 RX ADMIN — POTASSIUM CHLORIDE 10 MEQ: 7.46 INJECTION, SOLUTION INTRAVENOUS at 09:56

## 2023-01-12 RX ADMIN — Medication: at 21:44

## 2023-01-12 RX ADMIN — SODIUM CHLORIDE, PRESERVATIVE FREE 10 ML: 5 INJECTION INTRAVENOUS at 05:16

## 2023-01-12 RX ADMIN — POTASSIUM CHLORIDE 10 MEQ: 7.46 INJECTION, SOLUTION INTRAVENOUS at 12:04

## 2023-01-12 RX ADMIN — SODIUM CHLORIDE, PRESERVATIVE FREE 10 ML: 5 INJECTION INTRAVENOUS at 17:15

## 2023-01-12 RX ADMIN — PIPERACILLIN AND TAZOBACTAM 3.38 G: 3; .375 INJECTION, POWDER, FOR SOLUTION INTRAVENOUS at 17:14

## 2023-01-12 RX ADMIN — SUCRALFATE 1 G: 1 TABLET ORAL at 18:23

## 2023-01-12 RX ADMIN — ONDANSETRON 4 MG: 2 INJECTION INTRAMUSCULAR; INTRAVENOUS at 02:47

## 2023-01-12 RX ADMIN — MIDODRINE HYDROCHLORIDE 10 MG: 5 TABLET ORAL at 18:23

## 2023-01-12 RX ADMIN — SUCRALFATE 1 G: 1 TABLET ORAL at 21:40

## 2023-01-12 RX ADMIN — POTASSIUM CHLORIDE 10 MEQ: 7.46 INJECTION, SOLUTION INTRAVENOUS at 18:23

## 2023-01-12 RX ADMIN — MIDODRINE HYDROCHLORIDE 10 MG: 5 TABLET ORAL at 08:43

## 2023-01-12 RX ADMIN — OXYCODONE 5 MG: 5 TABLET ORAL at 05:13

## 2023-01-12 RX ADMIN — PROCHLORPERAZINE EDISYLATE 5 MG: 5 INJECTION INTRAMUSCULAR; INTRAVENOUS at 23:28

## 2023-01-12 RX ADMIN — ENOXAPARIN SODIUM 40 MG: 100 INJECTION SUBCUTANEOUS at 08:43

## 2023-01-12 RX ADMIN — SODIUM CHLORIDE, PRESERVATIVE FREE 10 ML: 5 INJECTION INTRAVENOUS at 21:45

## 2023-01-12 RX ADMIN — SUCRALFATE 1 G: 1 TABLET ORAL at 08:43

## 2023-01-12 RX ADMIN — Medication: at 18:23

## 2023-01-12 RX ADMIN — DEXTROSE MONOHYDRATE 50 ML/HR: 50 INJECTION, SOLUTION INTRAVENOUS at 13:30

## 2023-01-12 RX ADMIN — OXYCODONE 5 MG: 5 TABLET ORAL at 21:40

## 2023-01-12 NOTE — PROGRESS NOTES
Hospitalist Progress Note    NAME: Mandie Philippe   :  1960   MRN:  [id number]       Assessment / Plan:  Acute on chronic hypoxic respiratory failure  Trach dislodgement  Influenza A infection   H/o Head and neck Ca  Was on Hospice care. He revoked hospice upon admission    Hypernatremia/hyperchloremia/dehydration       Anemia  Due to blood loss  Repeat hemoglobin 7.2 this morning   H&H every 12 hour    Hypokalemia, replete with KCl  Aspiration pna with recurrent aspiration  RRT on  for low BP, pt had vomiting episode during RRT and thought top have aspirated. CXR showed increased interstitial lung opacities bilaterally with small right pleural effusion again noted. .    Cont' with IV abx, extend date due to recurrent aspiration  On midodrine tid. Off pressor support   Cont' with Tamiflu, nebs  Wean O2 as tolerated, currently on 50LHF at 35% FIO2  Pt does not wish to be on the ventilator if he fails HF. Tube feed resumed bed head of the bed need to be elevated  Repeat CXR again in a.m. Discussed with pulmonary    Hypernatremia resolved  Sodium is 139    Replace electrolytes    HARVEY resolved      TAMICA:  pending O2 weaning. Currently on 50LHF. Code Status: DNR, was under hospice  Surrogate Decision Maker: Lebron Morgan, [phone]. [facility] [phone]  DVT Prophylaxis: lovenox   GI Prophylaxis: not indicated  Baseline: PEG/Trach under hosp[facility] care at [facility]     Subjective:     Chief Complaint / Reason for Physician Visit  Patient seen and examined this afternoon still on high flow nasal cannula  Discussed with RN events overnight.      Review of Systems:  Symptom Y/N Comments  Symptom Y/N Comments   Fever/Chills    Chest Pain     Poor Appetite    Edema     Cough    Abdominal Pain     Sputum    Joint Pain     SOB/CAI    Pruritis/Rash     Nausea/vomit    Tolerating PT/OT     Diarrhea    Tolerating Diet     Constipation    Other       Could NOT obtain due to: Objective:     VITALS:   Last 24hrs VS reviewed since prior progress note. Most recent are:  Patient Vitals for the past 24 hrs:   Temp Pulse Resp BP SpO2   01/12/23 1213 -- -- -- -- 100 %   01/12/23 1105 96.9 °F (36.1 °C) 76 16 94/67 100 %   01/12/23 0800 -- 76 13 91/61 99 %   01/12/23 0737 -- -- -- -- 100 %   01/12/23 0714 98.4 °F (36.9 °C) 80 15 108/68 100 %   01/12/23 0430 -- -- -- -- 90 %   01/12/23 0425 -- 86 20 -- (!) 88 %   01/12/23 0309 98.1 °F (36.7 °C) 76 17 106/67 93 %   01/12/23 0200 -- 77 18 -- 92 %   01/12/23 0000 98.5 °F (36.9 °C) 73 16 97/61 94 %   01/11/23 2146 -- -- -- -- 97 %   01/11/23 1919 98.4 °F (36.9 °C) 71 17 94/62 98 %   01/11/23 1731 -- 83 -- (!) 94/58 --       Intake/Output Summary (Last 24 hours) at 1/12/2023 1705  Last data filed at 1/12/2023 1201  Gross per 24 hour   Intake 235 ml   Output 600 ml   Net -365 ml        I had a face to face encounter and independently examined this patient on 1/12/2023, as outlined below:  PHYSICAL EXAM:  General: Alert, nad, cachetic  EENT:  EOMI. Anicteric sclerae. MMM  Resp:  Coarse,  no wheezing or rales. No accessory muscle use  CV:  Regular  rhythm,  No edema  GI:  Soft, Non distended, Non tender. +PEG  Neurologic:  Alert and oriented X 3, nod to questions  Psych:   Not anxious nor agitated  Skin:  No rashes. No jaundice    Reviewed most current lab test results and cultures  YES  Reviewed most current radiology test results   YES  Review and summation of old records today    NO  Reviewed patient's current orders and MAR    YES  PMH/SH reviewed - no change compared to H&P  ________________________________________________________________________  Care Plan discussed with:    Comments   Patient x    Family      RN x    Care Manager     Consultant  x Dr Meeta Payton team rounds were held today with , nursing, pharmacist and clinical coordinator.   Patient's plan of care was discussed; medications were reviewed and discharge planning was addressed. ________________________________________________________________________  Total NON critical care TIME:  35   Minutes    Total CRITICAL CARE TIME Spent:   Minutes non procedure based      Comments   >50% of visit spent in counseling and coordination of care     ________________________________________________________________________  Gaurang Sanchez MD     Procedures: see electronic medical records for all procedures/Xrays and details which were not copied into this note but were reviewed prior to creation of Plan. LABS:  I reviewed today's most current labs and imaging studies. Pertinent labs include:  Recent Labs     01/12/23  0210 01/11/23  1948 01/11/23  0442 01/10/23  0418   WBC 9.2  --  8.4 10.6   HGB 7.2* 7.0* 7.4* 7.6*   HCT 24.8* 23.9* 25.8* 26.6*     --  160 160     Recent Labs     01/12/23 0210 01/11/23 0442 01/10/23  0418    143 146*   K 3.4* 3.6 2.9*    104 105   CO2 34* 36* 38*   GLU 89 100 111*   BUN 21* 26* 30*   CREA 0.98 1.03 1.01   CA 7.9* 8.0* 8.1*   MG 2.0  --  2.2   PHOS 1.6*  --   --    ALB 2.3*  --   --    TBILI 0.7  --   --    ALT 14  --   --        Signed:  Gaurang Sanchez MD

## 2023-01-12 NOTE — PROGRESS NOTES
Spiritual Care Assessment/Progress Note  [facility]      NAME: Fred Krabbe      MRN: [id number]  AGE: 57 y.o. SEX: male  Gnosticist Affiliation: Unknown   Language: English     1/12/2023     Total Time (in minutes): 13     Spiritual Assessment begun in [facility] PROGRESSIVE CARE through conversation with:         []Patient        [] Family    [] Friend(s)        Reason for Consult: Initial/Spiritual assessment, patient floor     Spiritual beliefs: (Please include comment if needed)     [] Identifies with a chloe tradition:         [] Supported by a chloe community:            [] Claims no spiritual orientation:           [] Seeking spiritual identity:                [] Adheres to an individual form of spirituality:           [x] Not able to assess:                           Identified resources for coping:      [] Prayer                               [] Music                  [] Guided Imagery     [] Family/friends                 [] Pet visits     [] Devotional reading                         [x] Unknown     [] Other:                                                Interventions offered during this visit: (See comments for more details)    Patient Interventions: Initial visit           Plan of Care:     [x] Support spiritual and/or cultural needs    [] Support AMD and/or advance care planning process      [] Support grieving process   [] Coordinate Rites and/or Rituals    [] Coordination with community clergy   [] No spiritual needs identified at this time   [] Detailed Plan of Care below (See Comments)  [] Make referral to Music Therapy  [] Make referral to Pet Therapy     [] Make referral to Addiction services  [] Make referral to [facility]  [] Make referral to Spiritual Care Partner  [] No future visits requested        [x] Contact Spiritual Care for further referrals     Comments:  Reviewed chart prior to visit on PCU for spiritual assessment. No family/friends present.  Patient appeared to be sleeping soundly. Unable to assess for spiritual needs or concerns.    available upon referral by staff or by patient/family request.       JUAN Snachez, [facility], Staff   ISA ZAVALA [facility] Paging Service  [phone])

## 2023-01-12 NOTE — PROGRESS NOTES
Comprehensive Nutrition Assessment    Type and Reason for Visit: Reassess, Consult    Nutrition Recommendations/Plan:   Twocal @ 20 mL/hr and advance as tolerated by 10 mL q 8 hours to goal rate of 60 mL/hr x 16 hours + 200 mL water flushes q 4 hours (1920 kcals, 80g protein, 1872 mL water)  Replace phos     Nutrition Assessment:    Chart reviewed; consult received for TF orders and management. TF has been on hold due to respiratory status. Patient with orders for carafate 4x/day which requires TF to be held for 8 hours/day. TF recommendations provided above based on 16 hours/day. Would consider bolus feedings if tolerating continuous infusion and respiratory status improving/stable to avoid TF holdings (Twocal 240 mL 4x/day). Phos low and in need of repletion. Continue to monitor lytes closely and replete as needed. Malnutrition Assessment:  Malnutrition Status:  Severe malnutrition (01/05/23 1106)    Context:  Chronic illness     Findings of the 6 clinical characteristics of malnutrition:   Energy Intake:  75% or less est energy requirements for 1 month or longer  Weight Loss:  Unable to assess     Body Fat Loss:  Severe body fat loss, Triceps, Fat overlying ribs, Buccal region   Muscle Mass Loss:  Severe muscle mass loss, Temples (temporalis), Clavicles (pectoralis &deltoids), Calf (gastrocnemius)  Fluid Accumulation:  No significant fluid accumulation,     Strength:  Not performed     Nutrition Related Findings:    K+ 3.4, Phos 1.6, BG -111   Midodrine, KCl, Carafate, D5% IVF   Wound Type: Moisture associate skin damage    Current Nutrition Intake & Therapies:  Average Meal Intake: NPO     DIET NPO    Anthropometric Measures:  Height: 6' 5\" (195.6 cm)  Ideal Body Weight (IBW): 208 lbs (95 kg)     Current Body Wt:  54.1 kg (119 lb 4.3 oz), 56.9 % IBW.     Current BMI (kg/m2): 14.1                          BMI Category: Underweight (BMI less than 18.5)    Estimated Daily Nutrient Needs:  Energy Requirements Based On: Formula  Weight Used for Energy Requirements: Current  Energy (kcal/day): 1884 kcals (BMR x 1. 3AF)  Weight Used for Protein Requirements: Current  Protein (g/day): 76-97g (1.4-1.8 g/kg bw)  Method Used for Fluid Requirements: 1 ml/kcal  Fluid (ml/day): 1900 mL    Nutrition Diagnosis:   Inadequate protein-energy intake related to impaired respiratory function as evidenced by NPO or clear liquid status due to medical condition    Nutrition Interventions:   Food and/or Nutrient Delivery: Start tube feeding  Nutrition Education/Counseling: No recommendations at this time  Coordination of Nutrition Care: Continue to monitor while inpatient       Goals:  Previous Goal Met: Progressing toward goal(s)  Goals:  Tolerate nutrition support at goal rate, by next RD assessment       Nutrition Monitoring and Evaluation:   Behavioral-Environmental Outcomes: None identified  Food/Nutrient Intake Outcomes: Enteral nutrition intake/tolerance  Physical Signs/Symptoms Outcomes: Biochemical data, Weight, Fluid status or edema, Hemodynamic status    Discharge Planning:    Enteral nutrition    Olesya Vela RD  Contact: ext [phone]

## 2023-01-12 NOTE — PROGRESS NOTES
1900H: Bedside shift change report given to [address] (oncoming nurse) by Nneka Carlton RN (offgoing nurse). Report included the following information SBAR, Kardex, Intake/Output, MAR, Recent Results, and Cardiac Rhythm SINUS RHYTHM .      0000h: 10 beats of Vtach noted on telemonitor; Assessed patient; patient was sleeping, no complain of chest pain. informed NP Ivan, ordered electrolytes test in morning labs    0425H: Chlorhexidine bath given to patient; Trache care done; inner cannula replaced with supervision of RT. Suctioned as necessary. End of Shift Note    Bedside shift change report given to  [facility] [address] (oncoming nurse) by Marko Cates RN (offgoing nurse). Report included the following information SBAR, Kardex, Intake/Output, MAR, Recent Results, Med Rec Status, and Cardiac Rhythm SINUS RHYTHM    Shift worked:  1900-0730H     Shift summary and any significant changes:     See above notes     Concerns for physician to address:       Zone phone for oncoming shift:   N/A       Activity:  Activity Level: Bed Rest  Number times ambulated in hallways past shift: 0  Number of times OOB to chair past shift: 0    Cardiac:   Cardiac Monitoring: Yes      Cardiac Rhythm: Sinus Rhythm    Access:  Current line(s): port     Genitourinary:   Urinary status: pino    Respiratory:   O2 Device: Heated, Hi flow nasal cannula, Tracheostomy  Chronic home O2 use?: YES  Incentive spirometer at bedside: YES       GI:  Last Bowel Movement Date: 01/11/23  Current diet:  DIET NPO  Passing flatus: YES  Tolerating current diet: YES       Pain Management:   Patient states pain is manageable on current regimen: YES    Skin:  Edward Score: 13  Interventions: turn team, speciality bed, float heels, PT/OT consult, and internal/external urinary devices    Patient Safety:  Fall Score:  Total Score: 4  Interventions: bed/chair alarm, assistive device (walker, cane, etc), and gripper socks  High Fall Risk: Yes    Length of Stay:  Expected LOS: 4d 9h  Actual LOS: [address] [facility], [address] [facility]

## 2023-01-12 NOTE — PROGRESS NOTES
Problem: Aspiration - Risk of  Goal: *Absence of aspiration  Outcome: Progressing Towards Goal     Problem: Risk for Spread of Infection  Goal: Prevent transmission of infectious organism to others  Description: Prevent the transmission of infectious organisms to other patients, staff members, and visitors. Outcome: Progressing Towards Goal     Problem: Falls - Risk of  Goal: *Absence of Falls  Description: Document Charlielanette Durand Fall Risk and appropriate interventions in the flowsheet. Outcome: Progressing Towards Goal  Note: Fall Risk Interventions:  Mobility Interventions: Bed/chair exit alarm, OT consult for ADLs, Patient to call before getting OOB, PT Consult for mobility concerns    Mentation Interventions: Adequate sleep, hydration, pain control, Bed/chair exit alarm, Door open when patient unattended, Reorient patient, Room close to nurse's station, More frequent rounding    Medication Interventions: Bed/chair exit alarm, Patient to call before getting OOB, Teach patient to arise slowly    Elimination Interventions: Bed/chair exit alarm, Call light in reach, Patient to call for help with toileting needs, Toileting schedule/hourly rounds    History of Falls Interventions: Bed/chair exit alarm, Door open when patient unattended, Consult care management for discharge planning, Room close to nurse's station, Vital signs minimum Q4HRs X 24 hrs (comment for end date)         Problem: Pressure Injury - Risk of  Goal: *Prevention of pressure injury  Description: Document Edward Scale and appropriate interventions in the flowsheet.   Outcome: Progressing Towards Goal  Note: Pressure Injury Interventions:  Sensory Interventions: Assess changes in LOC, Assess need for specialty bed, Chair cushion, Check visual cues for pain, Float heels, Keep linens dry and wrinkle-free, Maintain/enhance activity level    Moisture Interventions: Absorbent underpads, Apply protective barrier, creams and emollients, Assess need for specialty bed, Internal/External urinary devices, Limit adult briefs, Minimize layers    Activity Interventions: Assess need for specialty bed, Pressure redistribution bed/mattress(bed type), PT/OT evaluation    Mobility Interventions: Assess need for specialty bed, Float heels, HOB 30 degrees or less, Pressure redistribution bed/mattress (bed type), PT/OT evaluation    Nutrition Interventions: Document food/fluid/supplement intake    Friction and Shear Interventions: Apply protective barrier, creams and emollients, HOB 30 degrees or less, Foam dressings/transparent film/skin sealants, Lift sheet

## 2023-01-12 NOTE — PROGRESS NOTES
Received notification from bedside RN about patient with regards to: 10 beats of VT, no associated symptoms reported  VS: BP 97/61, HR 73, RR 11, O2 sat 93    Intervention given: CMP.  Magnesium and Phos added to AM CBC

## 2023-01-12 NOTE — CONSULTS
Pulmonary, Critical Care, and Sleep Medicine    Patient Consult    Name: Bud Baker MRN: [id number]   : 1960 Hospital: [facility]   Date: 2023        IMPRESSION:   Hypoxic respiratory failure on high flow via trach collar. Still on trach collar oxygen. Had another acute aspiration on 1/10/23. Witnessed aspiration event 23 - on abx coverage for aspiration pneumonia - there is no dense or obvious fluffy infiltrate within the right lung, but there are crackles on the right on exam  Influenza A+ 23, on Tamiflu. COVID negative  Tracheostomy w/ hx of H&N cancer  Hypotension-now better. Hypokalemia, hypomagnesemia  PEG tube for nutrition  Prognosis guarded. DNR status. Discussed with nurse this am.       RECOMMENDATIONS:   Aspiration precautions with HOB always elevated, this needs to be strictly adhered to. If pt needs to lie flat for testing or procedure would hold   tube feeds for several hours. Will extend the Zosyn abx in light or recurrent aspiration and now suctioning vomitus from trach. Adjust oxygen to keep pox over 90%. Wean oxygen as able. Will repeat CXR in am  Repeat labs in am.   Will follow. Subjective:   Pt reports he is making small progress. No chest pain. Has some thick secretions. He is to have tube feeds restarted. We discussed that he much keep his HOB elevated at all times. Pt has been feeling pretty well. Has trach in place. Still some dark secretions. Still on high level of oxygen. This patient has been seen and evaluated at the request of Dr. Mary Henson for hypoxia, flu / aspiration and tracheostomy status. Patient is a 57 y.o. male admitted here on  b/c of trach dislodgement. He was hypoxic and diagnosed with Influenza A. He's been on HF O2. During trach care / suctioning yesterday, he vomited and the RT witnessed an aspiration event. He's on Zosyn. He has a PEG tube.  He was in hospice care until he was admitted. Hypotension has been an issue and he's on midodrine and norepinephrine gtt. Pressures today ranging  systolic. Lactate has been normal.  WBC has fluctuated, being normal on  and now up to 18.2  Tmax 100.2 in the past 24 hours. / paired blood cultures without growth      He is awake and wants iced tea. Denies chest pain or bothersome cough. ROS is limited to respiratory system due to pt's lack of verbal communication. Past Medical History:   Diagnosis Date    Cancer ([address]     Tongue and lung      Past Surgical History:   Procedure Laterality Date    HX OTHER SURGICAL      HX VASCULAR ACCESS      MT UNLISTED PROCEDURE ABDOMEN PERITONEUM & OMENTUM        Prior to Admission medications    Not on File     No Known Allergies   Social History     Tobacco Use    Smoking status: Not on file    Smokeless tobacco: Not on file   Substance Use Topics    Alcohol use: Not on file      History reviewed. No pertinent family history. Current Facility-Administered Medications   Medication Dose Route Frequency    potassium chloride 10 mEq in 100 ml IVPB  10 mEq IntraVENous Q2H    piperacillin-tazobactam (ZOSYN) 3.375 g in 0.9% sodium chloride (MBP/ADV) 100 mL MBP  3.375 g IntraVENous Q8H    zinc oxide-cod liver oil (DESITIN) 40 % paste   Topical BID and QHS    sucralfate (CARAFATE) tablet 1 g  1 g Oral AC&HS    midodrine (PROAMATINE) tablet 10 mg  10 mg Oral TID    dextrose 5% infusion  50 mL/hr IntraVENous CONTINUOUS    sodium chloride (NS) flush 5-40 mL  5-40 mL IntraVENous Q8H    enoxaparin (LOVENOX) injection 40 mg  40 mg SubCUTAneous DAILY       Review of Systems:  Pertinent items are noted in HPI.     Objective:   Vital Signs:    Visit Vitals  BP 91/61   Pulse 76   Temp 98.4 °F (36.9 °C)   Resp 13   Ht 6' 5\" (1.956 m)   Wt 54.1 kg (119 lb 4.3 oz)   SpO2 99%   BMI 14.14 kg/m²       O2 Device: Heated, Hi flow nasal cannula   O2 Flow Rate (L/min): 35 l/min   Temp (24hrs), Av.4 °F (36.9 °C), Min:98.1 °F (36.7 °C), Max:98.5 °F (36.9 °C)       Intake/Output:   Last shift:      No intake/output data recorded. Last 3 shifts: 01/10 1901 - 01/12 0700  In: 1896.7 [I.V.:1786.7]  Out: 1400 [Urine:1400]    Intake/Output Summary (Last 24 hours) at 1/12/2023 1057  Last data filed at 1/12/2023 0309  Gross per 24 hour   Intake 110 ml   Output 600 ml   Net -490 ml        Physical Exam:   General:  Cachectic, older man resting in bed in no distress. Seems comfortable. Head:  Normocephalic, without obvious abnormality, atraumatic. Eyes:  Conjunctivae/corneas clear. PERRL, EOMs intact. Nose: Nares normal.        Neck: Supple, symmetrical, tracheostomy with HF O2 entrained. Lungs:   He is breathing pretty comfortably,  No wheezing or rhonchi elsewhere. Has some decreased BS of the right lung base. Heart:  Regular rate and rhythm, S1, S2 normal, no murmur, click, rub or gallop. Abdomen:   Soft, non-tender. PEG tube   Extremities: Extremities normal, atraumatic, no cyanosis or edema. Skin: No rashes or lesions       Neurologic: Grossly nonfocal. He writes to communicate. Able use some hand gestures to communicate as well.       Data review:     Recent Results (from the past 24 hour(s))   HGB & HCT    Collection Time: 01/11/23  7:48 PM   Result Value Ref Range    HGB 7.0 (L) 12.1 - 17.0 g/dL    HCT 23.9 (L) 36.6 - 50.3 %   CBC W/O DIFF    Collection Time: 01/12/23  2:10 AM   Result Value Ref Range    WBC 9.2 4.1 - 11.1 K/uL    RBC 2.79 (L) 4.10 - 5.70 M/uL    HGB 7.2 (L) 12.1 - 17.0 g/dL    HCT 24.8 (L) 36.6 - 50.3 %    MCV 88.9 80.0 - 99.0 FL    MCH 25.8 (L) 26.0 - 34.0 PG    MCHC 29.0 (L) 30.0 - 36.5 g/dL    RDW 18.4 (H) 11.5 - 14.5 %    PLATELET 242 537 - 148 K/uL    MPV 12.1 8.9 - 12.9 FL    NRBC 0.0 0  WBC    ABSOLUTE NRBC 0.00 0.00 - 2.57 K/uL   METABOLIC PANEL, COMPREHENSIVE    Collection Time: 01/12/23  2:10 AM   Result Value Ref Range    Sodium 139 136 - 145 mmol/L    Potassium 3.4 (L) 3.5 - 5.1 mmol/L    Chloride 102 97 - 108 mmol/L    CO2 34 (H) 21 - 32 mmol/L    Anion gap 3 (L) 5 - 15 mmol/L    Glucose 89 65 - 100 mg/dL    BUN 21 (H) 6 - 20 MG/DL    Creatinine 0.98 0.70 - 1.30 MG/DL    BUN/Creatinine ratio 21 (H) 12 - 20      eGFR >60 >60 ml/min/1.73m2    Calcium 7.9 (L) 8.5 - 10.1 MG/DL    Bilirubin, total 0.7 0.2 - 1.0 MG/DL    ALT (SGPT) 14 12 - 78 U/L    AST (SGOT) 24 15 - 37 U/L    Alk. phosphatase 100 45 - 117 U/L    Protein, total 5.3 (L) 6.4 - 8.2 g/dL    Albumin 2.3 (L) 3.5 - 5.0 g/dL    Globulin 3.0 2.0 - 4.0 g/dL    A-G Ratio 0.8 (L) 1.1 - 2.2     MAGNESIUM    Collection Time: 01/12/23  2:10 AM   Result Value Ref Range    Magnesium 2.0 1.6 - 2.4 mg/dL   PHOSPHORUS    Collection Time: 01/12/23  2:10 AM   Result Value Ref Range    Phosphorus 1.6 (L) 2.6 - 4.7 MG/DL       Imaging:  I have personally reviewed the patients radiographs and have reviewed the reports:  CXR 1-9-2023:  with diffuse bilateral interstitial opacities. Increasing Right effusion. CXR: 1-11-23:COMPARISON: 1/10/2023     FINDINGS: Single AP portable view of the chest demonstrates no change in  position of the lines and tubes. The cardiomediastinal silhouette is unchanged. Diffuse interstitial and airspace opacities. No pneumothorax. IMPRESSION  No significant change.      Edward Riddle MD

## 2023-01-12 NOTE — PROGRESS NOTES
Transition of Care Plan:     RUR: 14%     Disposition:Return to [facility]     Follow up appointments: To be done by facility     DME needed:None     Transportation at Discharge: [facility]     Keys or means to access home:  N/A--Patient returning to [facility].  Medicare Letter:N/A--Patient has [employer] Medicaid     Is patient a  and connected with the [facility]? No                If yes, was [employer] transfer form completed and [facility] notified? N/A     Caregiver Contact:Friend     Discharge Caregiver contacted prior to discharge? To be contacted     Care Conference needed?:   No          Patient continues to be monitored in pcu. Patient on 35 liters high flow of oxygen at 65% at this time. Per Bekah can do 15 liters oxygen. Will continue to monitor. Jeri Lindsey  RN BSN CRM        [phone]

## 2023-01-13 ENCOUNTER — APPOINTMENT (OUTPATIENT)
Dept: GENERAL RADIOLOGY | Age: 63
DRG: 143 | End: 2023-01-13
Attending: NURSE PRACTITIONER
Payer: MEDICAID

## 2023-01-13 ENCOUNTER — APPOINTMENT (OUTPATIENT)
Dept: GENERAL RADIOLOGY | Age: 63
DRG: 143 | End: 2023-01-13
Attending: INTERNAL MEDICINE
Payer: MEDICAID

## 2023-01-13 LAB
ANION GAP SERPL CALC-SCNC: 7 MMOL/L (ref 5–15)
BASOPHILS # BLD: 0 K/UL (ref 0–0.1)
BASOPHILS NFR BLD: 0 % (ref 0–1)
BUN SERPL-MCNC: 16 MG/DL (ref 6–20)
BUN/CREAT SERPL: 17 (ref 12–20)
CALCIUM SERPL-MCNC: 8.2 MG/DL (ref 8.5–10.1)
CHLORIDE SERPL-SCNC: 100 MMOL/L (ref 97–108)
CO2 SERPL-SCNC: 31 MMOL/L (ref 21–32)
CREAT SERPL-MCNC: 0.95 MG/DL (ref 0.7–1.3)
DIFFERENTIAL METHOD BLD: ABNORMAL
EOSINOPHIL # BLD: 0.1 K/UL (ref 0–0.4)
EOSINOPHIL NFR BLD: 1 % (ref 0–7)
ERYTHROCYTE [DISTWIDTH] IN BLOOD BY AUTOMATED COUNT: 19.2 % (ref 11.5–14.5)
GLUCOSE SERPL-MCNC: 110 MG/DL (ref 65–100)
HCT VFR BLD AUTO: 27 % (ref 36.6–50.3)
HGB BLD-MCNC: 8 G/DL (ref 12.1–17)
IMM GRANULOCYTES # BLD AUTO: 0.1 K/UL (ref 0–0.04)
IMM GRANULOCYTES NFR BLD AUTO: 1 % (ref 0–0.5)
LYMPHOCYTES # BLD: 0.5 K/UL (ref 0.8–3.5)
LYMPHOCYTES NFR BLD: 4 % (ref 12–49)
MAGNESIUM SERPL-MCNC: 1.9 MG/DL (ref 1.6–2.4)
MCH RBC QN AUTO: 26.1 PG (ref 26–34)
MCHC RBC AUTO-ENTMCNC: 29.6 G/DL (ref 30–36.5)
MCV RBC AUTO: 88.2 FL (ref 80–99)
MONOCYTES # BLD: 0.7 K/UL (ref 0–1)
MONOCYTES NFR BLD: 5 % (ref 5–13)
NEUTS SEG # BLD: 11.7 K/UL (ref 1.8–8)
NEUTS SEG NFR BLD: 90 % (ref 32–75)
NRBC # BLD: 0 K/UL (ref 0–0.01)
NRBC BLD-RTO: 0 PER 100 WBC
PHOSPHATE SERPL-MCNC: 1.5 MG/DL (ref 2.6–4.7)
PLATELET # BLD AUTO: 225 K/UL (ref 150–400)
PMV BLD AUTO: 11.3 FL (ref 8.9–12.9)
POTASSIUM SERPL-SCNC: 3.6 MMOL/L (ref 3.5–5.1)
PROCALCITONIN SERPL-MCNC: 0.26 NG/ML
RBC # BLD AUTO: 3.06 M/UL (ref 4.1–5.7)
SODIUM SERPL-SCNC: 138 MMOL/L (ref 136–145)
WBC # BLD AUTO: 13.1 K/UL (ref 4.1–11.1)

## 2023-01-13 PROCEDURE — 74011250636 HC RX REV CODE- 250/636: Performed by: STUDENT IN AN ORGANIZED HEALTH CARE EDUCATION/TRAINING PROGRAM

## 2023-01-13 PROCEDURE — 36415 COLL VENOUS BLD VENIPUNCTURE: CPT

## 2023-01-13 PROCEDURE — 84100 ASSAY OF PHOSPHORUS: CPT

## 2023-01-13 PROCEDURE — 71045 X-RAY EXAM CHEST 1 VIEW: CPT

## 2023-01-13 PROCEDURE — 84145 PROCALCITONIN (PCT): CPT

## 2023-01-13 PROCEDURE — 74011250637 HC RX REV CODE- 250/637: Performed by: INTERNAL MEDICINE

## 2023-01-13 PROCEDURE — 74011000250 HC RX REV CODE- 250: Performed by: GENERAL ACUTE CARE HOSPITAL

## 2023-01-13 PROCEDURE — 83735 ASSAY OF MAGNESIUM: CPT

## 2023-01-13 PROCEDURE — 74011250636 HC RX REV CODE- 250/636: Performed by: GENERAL ACUTE CARE HOSPITAL

## 2023-01-13 PROCEDURE — 74011250637 HC RX REV CODE- 250/637: Performed by: PHYSICIAN ASSISTANT

## 2023-01-13 PROCEDURE — 77010033711 HC HIGH FLOW OXYGEN

## 2023-01-13 PROCEDURE — 85025 COMPLETE CBC W/AUTO DIFF WBC: CPT

## 2023-01-13 PROCEDURE — 74011250636 HC RX REV CODE- 250/636: Performed by: NURSE PRACTITIONER

## 2023-01-13 PROCEDURE — 74011250636 HC RX REV CODE- 250/636: Performed by: INTERNAL MEDICINE

## 2023-01-13 PROCEDURE — 74011000258 HC RX REV CODE- 258: Performed by: STUDENT IN AN ORGANIZED HEALTH CARE EDUCATION/TRAINING PROGRAM

## 2023-01-13 PROCEDURE — 80048 BASIC METABOLIC PNL TOTAL CA: CPT

## 2023-01-13 PROCEDURE — 65270000046 HC RM TELEMETRY

## 2023-01-13 RX ADMIN — PROCHLORPERAZINE EDISYLATE 5 MG: 5 INJECTION INTRAMUSCULAR; INTRAVENOUS at 23:02

## 2023-01-13 RX ADMIN — OXYCODONE 5 MG: 5 TABLET ORAL at 22:16

## 2023-01-13 RX ADMIN — Medication: at 22:21

## 2023-01-13 RX ADMIN — OXYCODONE 5 MG: 5 TABLET ORAL at 03:53

## 2023-01-13 RX ADMIN — SUCRALFATE 1 G: 1 TABLET ORAL at 12:35

## 2023-01-13 RX ADMIN — Medication: at 12:35

## 2023-01-13 RX ADMIN — MORPHINE SULFATE 2 MG: 2 INJECTION, SOLUTION INTRAMUSCULAR; INTRAVENOUS at 17:00

## 2023-01-13 RX ADMIN — MIDODRINE HYDROCHLORIDE 10 MG: 5 TABLET ORAL at 22:15

## 2023-01-13 RX ADMIN — PIPERACILLIN AND TAZOBACTAM 3.38 G: 3; .375 INJECTION, POWDER, FOR SOLUTION INTRAVENOUS at 12:34

## 2023-01-13 RX ADMIN — PIPERACILLIN AND TAZOBACTAM 3.38 G: 3; .375 INJECTION, POWDER, FOR SOLUTION INTRAVENOUS at 17:16

## 2023-01-13 RX ADMIN — MORPHINE SULFATE 2 MG: 2 INJECTION, SOLUTION INTRAMUSCULAR; INTRAVENOUS at 19:20

## 2023-01-13 RX ADMIN — SODIUM CHLORIDE, PRESERVATIVE FREE 10 ML: 5 INJECTION INTRAVENOUS at 06:40

## 2023-01-13 RX ADMIN — SODIUM CHLORIDE, PRESERVATIVE FREE 10 ML: 5 INJECTION INTRAVENOUS at 22:22

## 2023-01-13 RX ADMIN — SODIUM CHLORIDE, PRESERVATIVE FREE 10 ML: 5 INJECTION INTRAVENOUS at 17:00

## 2023-01-13 RX ADMIN — PIPERACILLIN AND TAZOBACTAM 3.38 G: 3; .375 INJECTION, POWDER, FOR SOLUTION INTRAVENOUS at 00:56

## 2023-01-13 RX ADMIN — DEXTROSE MONOHYDRATE 50 ML/HR: 50 INJECTION, SOLUTION INTRAVENOUS at 12:33

## 2023-01-13 RX ADMIN — ENOXAPARIN SODIUM 40 MG: 100 INJECTION SUBCUTANEOUS at 09:02

## 2023-01-13 RX ADMIN — SUCRALFATE 1 G: 1 TABLET ORAL at 22:15

## 2023-01-13 RX ADMIN — Medication: at 17:17

## 2023-01-13 RX ADMIN — MIDODRINE HYDROCHLORIDE 10 MG: 5 TABLET ORAL at 09:02

## 2023-01-13 RX ADMIN — SUCRALFATE 1 G: 1 TABLET ORAL at 09:02

## 2023-01-13 RX ADMIN — SUCRALFATE 1 G: 1 TABLET ORAL at 17:18

## 2023-01-13 RX ADMIN — MIDODRINE HYDROCHLORIDE 10 MG: 5 TABLET ORAL at 16:00

## 2023-01-13 NOTE — PROGRESS NOTES
Hospitalist Progress Note    NAME: [facility]   :  1960   MRN:  [id number]       Assessment / Plan:  Acute on chronic hypoxic respiratory failure  Trach dislodgement  Influenza A infection   H/o Head and neck Ca  Was on Hospice care. He revoked hospice upon admission    Hypernatremia/hyperchloremia/dehydration       Anemia      Hypokalemia, replete with KCl  Aspiration pna with recurrent aspiration  RRT on  for low BP, pt had vomiting episode during RRT and thought top have aspirated. CXR showed increased interstitial lung opacities bilaterally with small right pleural effusion again noted. .    Cont' with IV abx, extend date due to recurrent aspiration  On midodrine tid. Off pressor support   Cont' with Tamiflu, nebs  Wean O2 as tolerated, currently on 50LHF at 35% FIO2  Pt does not wish to be on the ventilator if he fails HF. Patient had event of possible aspiration again overnight, will held tube feeding again to be strictly n.p.o. Repeat CXR although no changes  Will consult palliative care for support    Hypernatremia resolved  Sodium is 139    Replace electrolytes    HARVEY resolved      TAMICA:  pending O2 weaning. Currently on 50LHF. Code Status: DNR, was under hospice  Surrogate Decision Maker: Guillermo Matos, [phone]. [facility] [phone]  DVT Prophylaxis: lovenox   GI Prophylaxis: not indicated  Baseline: PEG/Trach under hospice care at [facility]     Subjective:     Chief Complaint / Reason for Physician Visit  Patient seen this morning his oxygen requirement increased over the night due to aspiration patient feels tired of being on tube feed for multiple aspiration and his oxygen needs not improving  Discussed with RN events overnight.      Review of Systems:  Symptom Y/N Comments  Symptom Y/N Comments   Fever/Chills    Chest Pain     Poor Appetite    Edema     Cough    Abdominal Pain     Sputum    Joint Pain     SOB/CAI    Pruritis/Rash     Nausea/vomit Tolerating PT/OT     Diarrhea    Tolerating Diet     Constipation    Other       Could NOT obtain due to:      Objective:     VITALS:   Last 24hrs VS reviewed since prior progress note. Most recent are:  Patient Vitals for the past 24 hrs:   Temp Pulse Resp BP SpO2   01/13/23 0415 -- -- -- -- 100 %   01/13/23 0347 97.6 °F (36.4 °C) 89 23 96/64 95 %   01/13/23 0302 -- 86 13 -- 96 %   01/13/23 0230 -- 87 15 -- 96 %   01/13/23 0105 -- 98 22 -- 92 %   01/13/23 0045 98.1 °F (36.7 °C) 96 23 97/66 90 %   01/13/23 0004 -- -- -- -- (!) 86 %   01/12/23 2359 -- -- -- -- (!) 83 %   01/12/23 2140 -- 88 -- 103/71 --   01/12/23 2033 -- -- -- -- 90 %   01/12/23 2024 -- -- -- -- (!) 88 %   01/12/23 2000 98.1 °F (36.7 °C) 79 21 103/71 90 %   01/12/23 1632 -- -- -- -- 93 %   01/12/23 1600 97.8 °F (36.6 °C) 86 11 92/61 95 %   01/12/23 1213 -- -- -- -- 100 %   01/12/23 1105 96.9 °F (36.1 °C) 76 16 94/67 100 %         Intake/Output Summary (Last 24 hours) at 1/13/2023 0804  Last data filed at 1/12/2023 2000  Gross per 24 hour   Intake 145 ml   Output --   Net 145 ml          I had a face to face encounter and independently examined this patient on 1/13/2023, as outlined below:  PHYSICAL EXAM:  General: Alert, nad, cachetic  EENT:  EOMI. Anicteric sclerae. MMM  Resp:  Coarse,  no wheezing or rales. No accessory muscle use  CV:  Regular  rhythm,  No edema  GI:  Soft, Non distended, Non tender. +PEG  Neurologic:  Alert and oriented X 3, nod to questions  Psych:   Not anxious nor agitated  Skin:  No rashes.   No jaundice    Reviewed most current lab test results and cultures  YES  Reviewed most current radiology test results   YES  Review and summation of old records today    NO  Reviewed patient's current orders and MAR    YES  PMH/SH reviewed - no change compared to H&P  ________________________________________________________________________  Care Plan discussed with:    Comments   Patient x    Family      RN x    Care Manager Consultant  x Dr Chavo Forrester team rounds were held today with , nursing, pharmacist and clinical coordinator. Patient's plan of care was discussed; medications were reviewed and discharge planning was addressed. ________________________________________________________________________  Total NON critical care TIME:  35   Minutes    Total CRITICAL CARE TIME Spent:   Minutes non procedure based      Comments   >50% of visit spent in counseling and coordination of care     ________________________________________________________________________  Geraldo Coleman MD     Procedures: see electronic medical records for all procedures/Xrays and details which were not copied into this note but were reviewed prior to creation of Plan. LABS:  I reviewed today's most current labs and imaging studies. Pertinent labs include:  Recent Labs     01/13/23 0308 01/12/23 0210 01/11/23 1948 01/11/23 0442   WBC 13.1* 9.2  --  8.4   HGB 8.0* 7.2* 7.0* 7.4*   HCT 27.0* 24.8* 23.9* 25.8*    193  --  160       Recent Labs     01/13/23 0308 01/12/23 0210 01/11/23 0442    139 143   K 3.6 3.4* 3.6    102 104   CO2 31 34* 36*   * 89 100   BUN 16 21* 26*   CREA 0.95 0.98 1.03   CA 8.2* 7.9* 8.0*   MG 1.9 2.0  --    PHOS 1.5* 1.6*  --    ALB  --  2.3*  --    TBILI  --  0.7  --    ALT  --  14  --          Signed:  Geraldo Coleman MD

## 2023-01-13 NOTE — PROGRESS NOTES
Received notification from bedside RN about patient with regards to: increasing O2 requirement, recent vomiting  VS: /71, HR 88, RR 86% on HHF 35 L    Intervention given: CXR stat, Titrated HHF to 60 L    CXR: Mild bilateral interstitial and airspace opacities unchanged.

## 2023-01-13 NOTE — CONSULTS
Pulmonary, Critical Care, and Sleep Medicine    Patient Consult    Name: Brenda Stephens MRN: [id number]   : 1960 Hospital: [facility]   Date: 2023        IMPRESSION:   Hypoxic respiratory failure on high flow via trach collar. On fio2 of 60% and 50L. Tracheostomy w/ hx of H&N cancer  Had another acute aspiration on 1/10/23. Seems better. Witnessed aspiration event 23 - on abx coverage for aspiration pneumonia - there is no dense or obvious fluffy infiltrate within the right lung, but there are crackles on the right on exam  Influenza A+ 23, on Tamiflu. COVID negative  Hypotension-now better. Hypokalemia, hypomagnesemia  PEG tube for nutrition  Prognosis guarded. DNR status. Discussed with nurse this am.       RECOMMENDATIONS:   Aspiration precautions with HOB always elevated, this needs to be strictly adhered to. If pt needs to lie flat for testing or procedure would hold   tube feeds for several hours. Will extend the Zosyn abx in light or recurrent aspiration and now suctioning vomitus from trach. Adjust oxygen to keep pox over 90%. Wean oxygen as able. Will repeat CXR in am  Repeat labs in am.   Will follow. Subjective:   Pt reports he is making small progress. No chest pain. Has some thick secretions. He is to have tube feeds restarted. We discussed that he much keep his HOB elevated at all times. Has been feeling a little better. Pt has been feeling pretty well. Has trach in place. Still some dark secretions. Still on high level of oxygen. This patient has been seen and evaluated at the request of Dr. Adelia Tim for hypoxia, flu / aspiration and tracheostomy status. Patient is a 57 y.o. male admitted here on  b/c of trach dislodgement. He was hypoxic and diagnosed with Influenza A. He's been on HF O2. During trach care / suctioning yesterday, he vomited and the RT witnessed an aspiration event. He's on Zosyn. He has a PEG tube. He was in hospice care until he was admitted. Hypotension has been an issue and he's on midodrine and norepinephrine gtt. Pressures today ranging  systolic. Lactate has been normal.  WBC has fluctuated, being normal on 1/5 and now up to 18.2  Tmax 100.2 in the past 24 hours. 1/4 paired blood cultures without growth      He is awake and wants iced tea. Denies chest pain or bothersome cough. ROS is limited to respiratory system due to pt's lack of verbal communication. Past Medical History:   Diagnosis Date    Cancer ([address]     Tongue and lung      Past Surgical History:   Procedure Laterality Date    HX OTHER SURGICAL      HX VASCULAR ACCESS      CT UNLISTED PROCEDURE ABDOMEN PERITONEUM & OMENTUM        Prior to Admission medications    Not on File     No Known Allergies   Social History     Tobacco Use    Smoking status: Not on file    Smokeless tobacco: Not on file   Substance Use Topics    Alcohol use: Not on file      History reviewed. No pertinent family history. Current Facility-Administered Medications   Medication Dose Route Frequency    piperacillin-tazobactam (ZOSYN) 3.375 g in 0.9% sodium chloride (MBP/ADV) 100 mL MBP  3.375 g IntraVENous Q8H    zinc oxide-cod liver oil (DESITIN) 40 % paste   Topical BID and QHS    sucralfate (CARAFATE) tablet 1 g  1 g Oral AC&HS    midodrine (PROAMATINE) tablet 10 mg  10 mg Oral TID    dextrose 5% infusion  50 mL/hr IntraVENous CONTINUOUS    sodium chloride (NS) flush 5-40 mL  5-40 mL IntraVENous Q8H    enoxaparin (LOVENOX) injection 40 mg  40 mg SubCUTAneous DAILY       Review of Systems:  Pertinent items are noted in HPI.     Objective:   Vital Signs:    Visit Vitals  BP 96/64 (BP 1 Location: Right upper arm, BP Patient Position: At rest)   Pulse 89   Temp 97.6 °F (36.4 °C)   Resp 23   Ht 6' 5\" (1.956 m)   Wt 54.6 kg (120 lb 5.9 oz)   SpO2 95%   BMI 14.27 kg/m²       O2 Device: Hi flow nasal cannula, Heated   O2 Flow Rate (L/min): 50 l/min   Temp (24hrs), Av.7 °F (36.5 °C), Min:96.9 °F (36.1 °C), Max:98.1 °F (36.7 °C)       Intake/Output:   Last shift:      No intake/output data recorded. Last 3 shifts: 1901 -  0700  In: 285   Out: 1350 [Urine:1350]    Intake/Output Summary (Last 24 hours) at 2023 1048  Last data filed at 2023 0347  Gross per 24 hour   Intake 145 ml   Output 750 ml   Net -605 ml        Physical Exam:   General:  Cachectic, older man resting in bed in no distress. Seems comfortable. Head:  Normocephalic, without obvious abnormality, atraumatic. Eyes:  Conjunctivae/corneas clear. PERRL, EOMs intact. Nose: Nares normal.        Neck: Supple, symmetrical, tracheostomy with HF O2 entrained. Lungs:   He is breathing pretty comfortably,  No wheezing or rhonchi elsewhere. Has some decreased BS of the right lung base. Heart:  Regular rate and rhythm, S1, S2 normal, no murmur, click, rub or gallop. Abdomen:   Soft, non-tender. PEG tube   Extremities: Extremities normal, atraumatic, no cyanosis or edema. Skin: No rashes or lesions       Neurologic: Grossly nonfocal. He writes to communicate. Able use some hand gestures to communicate as well.       Data review:     Recent Results (from the past 24 hour(s))   MAGNESIUM    Collection Time: 23  3:08 AM   Result Value Ref Range    Magnesium 1.9 1.6 - 2.4 mg/dL   PHOSPHORUS    Collection Time: 23  3:08 AM   Result Value Ref Range    Phosphorus 1.5 (L) 2.6 - 4.7 MG/DL   CBC WITH AUTOMATED DIFF    Collection Time: 23  3:08 AM   Result Value Ref Range    WBC 13.1 (H) 4.1 - 11.1 K/uL    RBC 3.06 (L) 4.10 - 5.70 M/uL    HGB 8.0 (L) 12.1 - 17.0 g/dL    HCT 27.0 (L) 36.6 - 50.3 %    MCV 88.2 80.0 - 99.0 FL    MCH 26.1 26.0 - 34.0 PG    MCHC 29.6 (L) 30.0 - 36.5 g/dL    RDW 19.2 (H) 11.5 - 14.5 %    PLATELET 598 801 - 911 K/uL    MPV 11.3 8.9 - 12.9 FL    NRBC 0.0 0  WBC    ABSOLUTE NRBC 0.00 0.00 - 0.01 K/uL    NEUTROPHILS 90 (H) 32 - 75 %    LYMPHOCYTES 4 (L) 12 - 49 %    MONOCYTES 5 5 - 13 %    EOSINOPHILS 1 0 - 7 %    BASOPHILS 0 0 - 1 %    IMMATURE GRANULOCYTES 1 (H) 0.0 - 0.5 %    ABS. NEUTROPHILS 11.7 (H) 1.8 - 8.0 K/UL    ABS. LYMPHOCYTES 0.5 (L) 0.8 - 3.5 K/UL    ABS. MONOCYTES 0.7 0.0 - 1.0 K/UL    ABS. EOSINOPHILS 0.1 0.0 - 0.4 K/UL    ABS. BASOPHILS 0.0 0.0 - 0.1 K/UL    ABS. IMM. GRANS. 0.1 (H) 0.00 - 0.04 K/UL    DF AUTOMATED     PROCALCITONIN    Collection Time: 01/13/23  3:08 AM   Result Value Ref Range    Procalcitonin 0.28 ng/mL   METABOLIC PANEL, BASIC    Collection Time: 01/13/23  3:08 AM   Result Value Ref Range    Sodium 138 136 - 145 mmol/L    Potassium 3.6 3.5 - 5.1 mmol/L    Chloride 100 97 - 108 mmol/L    CO2 31 21 - 32 mmol/L    Anion gap 7 5 - 15 mmol/L    Glucose 110 (H) 65 - 100 mg/dL    BUN 16 6 - 20 MG/DL    Creatinine 0.95 0.70 - 1.30 MG/DL    BUN/Creatinine ratio 17 12 - 20      eGFR >60 >60 ml/min/1.73m2    Calcium 8.2 (L) 8.5 - 10.1 MG/DL       Imaging:  I have personally reviewed the patients radiographs and have reviewed the reports:  CXR 1-9-2023:  with diffuse bilateral interstitial opacities. Increasing Right effusion. CXR: 1-11-23:COMPARISON: 1/10/2023     FINDINGS: Single AP portable view of the chest demonstrates no change in  position of the lines and tubes. The cardiomediastinal silhouette is unchanged. Diffuse interstitial and airspace opacities. No pneumothorax. IMPRESSION  No significant change.      Heydi Pretty MD

## 2023-01-13 NOTE — PROGRESS NOTES
1900h: Bedside shift change report given to [address] (oncoming nurse) by Antionette Salamanca RN (offgoing nurse). Report included the following information SBAR, Kardex, Intake/Output, MAR, Recent Results, Med Rec Status, and Cardiac Rhythm SINUS RHYTHM . 2200h: complained of neck and mouth pain; PRN Oxycodone given via PEG tube. 2345H: patient had an episode of vomiting; Compazine IV PRN given as ordered; Tube feeding stopped. Aspiration noted requiring increase oxygen supplement, increased Highflow to FiO2 75%. Called RT to come and assess patient; Informed NP Ed Quiroz, made order for Xray stat; called Xray tech for diagnostic test    End of Shift Note    Bedside shift change report given to Yordan Bryson (oncoming nurse) by Crystal Regalado RN (offgoing nurse). Report included the following information SBAR, Kardex, Intake/Output, MAR, Recent Results, Med Rec Status, and Cardiac Rhythm SINUS RHYTHM    Shift worked:  [id number]     Shift summary and any significant changes:          Concerns for physician to address:       Zone phone for oncoming shift:          Activity:  Activity Level: Bed Rest  Number times ambulated in hallways past shift: 0  Number of times OOB to chair past shift: 0    Cardiac:   Cardiac Monitoring: Yes      Cardiac Rhythm: Sinus Rhythm    Access:  Current line(s): port     Genitourinary:   Urinary status: pino    Respiratory:   O2 Device: Heated, Hi flow nasal cannula, Tracheostomy  Chronic home O2 use?: YES  Incentive spirometer at bedside: YES       GI:  Last Bowel Movement Date: 01/12/23  Current diet:  DIET NPO  ADULT TUBE FEEDING PEG; 2.0 Calorie; Delivery Method: Continuous; Continuous Initial Rate (mL/hr): 20; Continuous Advance Tube Feeding: Yes; Advancement Volume (mL/hr): 10; Advancement Frequency: Q 8 hours; Continuous Goal Rate (mL/hr): 60; Water . ..   Passing flatus: YES  Tolerating current diet: YES       Pain Management:   Patient states pain is manageable on current regimen: YES    Skin:  Edward Score: 14  Interventions: turn team, speciality bed, float heels, and increase time out of bed    Patient Safety:  Fall Score:  Total Score: 4  Interventions: bed/chair alarm, assistive device (walker, cane, etc), gripper socks, and pt to call before getting OOB  High Fall Risk: Yes    Length of Stay:  Expected LOS: 4d 9h  Actual LOS: [address], [facility]

## 2023-01-13 NOTE — PROGRESS NOTES
End of Shift Note    Bedside shift change report given to Morgan Brooks (oncoming nurse) by Kathy Andrea RN (offgoing nurse). Report included the following information SBAR, Kardex, and MAR    Shift worked:  7a-7p     Shift summary and any significant changes:     Peg feeding until 9pm to avoid aspiration     Concerns for physician to address:       Zone phone for oncoming shift:          Activity:  Activity Level: Bed Rest  Number times ambulated in hallways past shift: 0  Number of times OOB to chair past shift: 0    Cardiac:   Cardiac Monitoring: Yes      Cardiac Rhythm: Sinus Rhythm    Access:  Current line(s): central line     Genitourinary:   Urinary status: pino    Respiratory:   O2 Device: Hi flow nasal cannula  Chronic home O2 use?: NO  Incentive spirometer at bedside: NO       GI:  Last Bowel Movement Date: 01/12/23  Current diet:  DIET NPO  ADULT TUBE FEEDING PEG; 2.0 Calorie; Delivery Method: Continuous; Continuous Initial Rate (mL/hr): 20; Continuous Advance Tube Feeding: Yes; Advancement Volume (mL/hr): 10; Advancement Frequency: Q 8 hours; Continuous Goal Rate (mL/hr): 60; Water . .. Passing flatus: YES  Tolerating current diet: NO       Pain Management:   Patient states pain is manageable on current regimen: YES    Skin:  Edward Score: 14  Interventions: internal/external urinary devices    Patient Safety:  Fall Score:  Total Score: 4  Interventions: bed/chair alarm and pt to call before getting OOB  High Fall Risk: Yes    Length of Stay:  Expected LOS: 4d 9h  Actual LOS: 9      Kathy Andrea RN

## 2023-01-14 LAB
ANION GAP SERPL CALC-SCNC: 6 MMOL/L (ref 5–15)
BUN SERPL-MCNC: 13 MG/DL (ref 6–20)
BUN/CREAT SERPL: 15 (ref 12–20)
CALCIUM SERPL-MCNC: 8.1 MG/DL (ref 8.5–10.1)
CHLORIDE SERPL-SCNC: 100 MMOL/L (ref 97–108)
CO2 SERPL-SCNC: 31 MMOL/L (ref 21–32)
CREAT SERPL-MCNC: 0.89 MG/DL (ref 0.7–1.3)
ERYTHROCYTE [DISTWIDTH] IN BLOOD BY AUTOMATED COUNT: 19.4 % (ref 11.5–14.5)
GLUCOSE SERPL-MCNC: 111 MG/DL (ref 65–100)
HCT VFR BLD AUTO: 27.9 % (ref 36.6–50.3)
HGB BLD-MCNC: 8.5 G/DL (ref 12.1–17)
MAGNESIUM SERPL-MCNC: 1.9 MG/DL (ref 1.6–2.4)
MCH RBC QN AUTO: 26.7 PG (ref 26–34)
MCHC RBC AUTO-ENTMCNC: 30.5 G/DL (ref 30–36.5)
MCV RBC AUTO: 87.7 FL (ref 80–99)
NRBC # BLD: 0 K/UL (ref 0–0.01)
NRBC BLD-RTO: 0 PER 100 WBC
PHOSPHATE SERPL-MCNC: 1.7 MG/DL (ref 2.6–4.7)
PLATELET # BLD AUTO: 209 K/UL (ref 150–400)
PMV BLD AUTO: 11.2 FL (ref 8.9–12.9)
POTASSIUM SERPL-SCNC: 3.3 MMOL/L (ref 3.5–5.1)
RBC # BLD AUTO: 3.18 M/UL (ref 4.1–5.7)
SODIUM SERPL-SCNC: 137 MMOL/L (ref 136–145)
WBC # BLD AUTO: 11.3 K/UL (ref 4.1–11.1)

## 2023-01-14 PROCEDURE — 83735 ASSAY OF MAGNESIUM: CPT

## 2023-01-14 PROCEDURE — 85027 COMPLETE CBC AUTOMATED: CPT

## 2023-01-14 PROCEDURE — 74011000258 HC RX REV CODE- 258: Performed by: STUDENT IN AN ORGANIZED HEALTH CARE EDUCATION/TRAINING PROGRAM

## 2023-01-14 PROCEDURE — 74011250636 HC RX REV CODE- 250/636: Performed by: STUDENT IN AN ORGANIZED HEALTH CARE EDUCATION/TRAINING PROGRAM

## 2023-01-14 PROCEDURE — C9113 INJ PANTOPRAZOLE SODIUM, VIA: HCPCS | Performed by: STUDENT IN AN ORGANIZED HEALTH CARE EDUCATION/TRAINING PROGRAM

## 2023-01-14 PROCEDURE — 74011000250 HC RX REV CODE- 250: Performed by: INTERNAL MEDICINE

## 2023-01-14 PROCEDURE — 65270000046 HC RM TELEMETRY

## 2023-01-14 PROCEDURE — 84100 ASSAY OF PHOSPHORUS: CPT

## 2023-01-14 PROCEDURE — 74011000250 HC RX REV CODE- 250: Performed by: GENERAL ACUTE CARE HOSPITAL

## 2023-01-14 PROCEDURE — 74011250636 HC RX REV CODE- 250/636: Performed by: GENERAL ACUTE CARE HOSPITAL

## 2023-01-14 PROCEDURE — 74011250636 HC RX REV CODE- 250/636: Performed by: INTERNAL MEDICINE

## 2023-01-14 PROCEDURE — 94640 AIRWAY INHALATION TREATMENT: CPT

## 2023-01-14 PROCEDURE — 74011000250 HC RX REV CODE- 250: Performed by: STUDENT IN AN ORGANIZED HEALTH CARE EDUCATION/TRAINING PROGRAM

## 2023-01-14 PROCEDURE — 36415 COLL VENOUS BLD VENIPUNCTURE: CPT

## 2023-01-14 PROCEDURE — 74011250637 HC RX REV CODE- 250/637: Performed by: PHYSICIAN ASSISTANT

## 2023-01-14 PROCEDURE — 80048 BASIC METABOLIC PNL TOTAL CA: CPT

## 2023-01-14 PROCEDURE — 74011250636 HC RX REV CODE- 250/636: Performed by: NURSE PRACTITIONER

## 2023-01-14 PROCEDURE — 74011250637 HC RX REV CODE- 250/637: Performed by: INTERNAL MEDICINE

## 2023-01-14 PROCEDURE — 77010033711 HC HIGH FLOW OXYGEN

## 2023-01-14 RX ORDER — POTASSIUM CHLORIDE 7.45 MG/ML
10 INJECTION INTRAVENOUS
Status: COMPLETED | OUTPATIENT
Start: 2023-01-14 | End: 2023-01-14

## 2023-01-14 RX ADMIN — SUCRALFATE 1 G: 1 TABLET ORAL at 17:10

## 2023-01-14 RX ADMIN — SODIUM CHLORIDE 40 MG: 9 INJECTION, SOLUTION INTRAMUSCULAR; INTRAVENOUS; SUBCUTANEOUS at 14:04

## 2023-01-14 RX ADMIN — IPRATROPIUM BROMIDE AND ALBUTEROL SULFATE 3 ML: 2.5; .5 SOLUTION RESPIRATORY (INHALATION) at 10:28

## 2023-01-14 RX ADMIN — SODIUM CHLORIDE, PRESERVATIVE FREE 10 ML: 5 INJECTION INTRAVENOUS at 05:51

## 2023-01-14 RX ADMIN — POTASSIUM CHLORIDE 10 MEQ: 10 INJECTION, SOLUTION INTRAVENOUS at 12:00

## 2023-01-14 RX ADMIN — POTASSIUM CHLORIDE 10 MEQ: 10 INJECTION, SOLUTION INTRAVENOUS at 16:00

## 2023-01-14 RX ADMIN — MORPHINE SULFATE 2 MG: 2 INJECTION, SOLUTION INTRAMUSCULAR; INTRAVENOUS at 22:14

## 2023-01-14 RX ADMIN — PIPERACILLIN AND TAZOBACTAM 3.38 G: 3; .375 INJECTION, POWDER, FOR SOLUTION INTRAVENOUS at 02:25

## 2023-01-14 RX ADMIN — MORPHINE SULFATE 2 MG: 2 INJECTION, SOLUTION INTRAMUSCULAR; INTRAVENOUS at 17:09

## 2023-01-14 RX ADMIN — SODIUM CHLORIDE, PRESERVATIVE FREE 10 ML: 5 INJECTION INTRAVENOUS at 14:54

## 2023-01-14 RX ADMIN — ONDANSETRON 4 MG: 2 INJECTION INTRAMUSCULAR; INTRAVENOUS at 14:53

## 2023-01-14 RX ADMIN — POTASSIUM CHLORIDE 10 MEQ: 10 INJECTION, SOLUTION INTRAVENOUS at 14:53

## 2023-01-14 RX ADMIN — MORPHINE SULFATE 2 MG: 2 INJECTION, SOLUTION INTRAMUSCULAR; INTRAVENOUS at 14:04

## 2023-01-14 RX ADMIN — MIDODRINE HYDROCHLORIDE 10 MG: 5 TABLET ORAL at 22:13

## 2023-01-14 RX ADMIN — SODIUM CHLORIDE, PRESERVATIVE FREE 10 ML: 5 INJECTION INTRAVENOUS at 22:23

## 2023-01-14 RX ADMIN — PIPERACILLIN AND TAZOBACTAM 3.38 G: 3; .375 INJECTION, POWDER, FOR SOLUTION INTRAVENOUS at 09:57

## 2023-01-14 RX ADMIN — Medication: at 19:44

## 2023-01-14 RX ADMIN — MIDODRINE HYDROCHLORIDE 10 MG: 5 TABLET ORAL at 09:57

## 2023-01-14 RX ADMIN — SUCRALFATE 1 G: 1 TABLET ORAL at 22:14

## 2023-01-14 RX ADMIN — MORPHINE SULFATE 2 MG: 2 INJECTION, SOLUTION INTRAMUSCULAR; INTRAVENOUS at 09:57

## 2023-01-14 RX ADMIN — SUCRALFATE 1 G: 1 TABLET ORAL at 09:57

## 2023-01-14 RX ADMIN — MIDODRINE HYDROCHLORIDE 10 MG: 5 TABLET ORAL at 17:09

## 2023-01-14 RX ADMIN — SUCRALFATE 1 G: 1 TABLET ORAL at 14:04

## 2023-01-14 RX ADMIN — ENOXAPARIN SODIUM 40 MG: 100 INJECTION SUBCUTANEOUS at 09:57

## 2023-01-14 RX ADMIN — Medication: at 22:23

## 2023-01-14 RX ADMIN — Medication: at 14:04

## 2023-01-14 RX ADMIN — MORPHINE SULFATE 2 MG: 2 INJECTION, SOLUTION INTRAMUSCULAR; INTRAVENOUS at 19:36

## 2023-01-14 RX ADMIN — PROCHLORPERAZINE EDISYLATE 5 MG: 5 INJECTION INTRAMUSCULAR; INTRAVENOUS at 19:43

## 2023-01-14 NOTE — PROGRESS NOTES
Problem: Aspiration - Risk of  Goal: *Absence of aspiration  Outcome: Progressing Towards Goal     Problem: Falls - Risk of  Goal: *Absence of Falls  Description: Document Yoselin Fall Risk and appropriate interventions in the flowsheet. Outcome: Progressing Towards Goal  Note: Fall Risk Interventions:  Mobility Interventions: Bed/chair exit alarm, OT consult for ADLs, Patient to call before getting OOB, PT Consult for mobility concerns    Mentation Interventions: Adequate sleep, hydration, pain control, Bed/chair exit alarm, Door open when patient unattended, More frequent rounding, Reorient patient, Room close to nurse's station, Toileting rounds    Medication Interventions: Bed/chair exit alarm, Patient to call before getting OOB, Teach patient to arise slowly    Elimination Interventions: Bed/chair exit alarm, Call light in reach, Patient to call for help with toileting needs, Toileting schedule/hourly rounds    History of Falls Interventions: Bed/chair exit alarm, Door open when patient unattended, Room close to nurse's station, Vital signs minimum Q4HRs X 24 hrs (comment for end date)         Problem: Pressure Injury - Risk of  Goal: *Prevention of pressure injury  Description: Document Edward Scale and appropriate interventions in the flowsheet.   Outcome: Progressing Towards Goal  Note: Pressure Injury Interventions:  Sensory Interventions: Assess changes in LOC, Assess need for specialty bed, Chair cushion, Check visual cues for pain, Discuss PT/OT consult with provider, Keep linens dry and wrinkle-free, Minimize linen layers    Moisture Interventions: Absorbent underpads, Apply protective barrier, creams and emollients, Internal/External urinary devices, Minimize layers, Maintain skin hydration (lotion/cream)    Activity Interventions: Chair cushion, Assess need for specialty bed, Pressure redistribution bed/mattress(bed type), PT/OT evaluation    Mobility Interventions: Assess need for specialty bed, Float heels, HOB 30 degrees or less, PT/OT evaluation, Suspension boots    Nutrition Interventions: Document food/fluid/supplement intake    Friction and Shear Interventions: Apply protective barrier, creams and emollients, HOB 30 degrees or less, Foam dressings/transparent film/skin sealants, Minimize layers

## 2023-01-14 NOTE — PROGRESS NOTES
Hospitalist Progress Note    NAME: Kristen Hutchison   :  1960   MRN:  [id number]       Assessment / Plan:  Acute on chronic hypoxic respiratory failure  Trach dislodgement  Influenza A infection   H/o Head and neck Ca  Was on Hospice care. He revoked hospice upon admission    Hypernatremia/hyperchloremia/dehydration       Anemia  Patient had dark stool   No more episode  Hemoglobin stable at 8.5      Hypokalemia, replete with KCl  Aspiration pna with recurrent aspiration  RRT on  for low BP, pt had vomiting episode during RRT and thought top have aspirated. CXR showed increased interstitial lung opacities bilaterally with small right pleural effusion again noted. .    Cont' with IV abx, extend date due to recurrent aspiration  On midodrine tid. Off pressor support   Cont' with Tamiflu, nebs  Wean O2 as tolerated, currently on 50LHF at 35% FIO2  Pt does not wish to be on the ventilator if he fails HF. Patient had event of possible aspiration again overnight, will held tube feeding again to Repeat CXR although no changes  During the day while he is awake  Will consult palliative care for support    Hypernatremia resolved      Replace electrolytes    HARVEY resolved      TAMICA:  pending O2 weaning. Currently on 50LHF. Code Status: DNR, was under hospice  Surrogate Decision Maker: Mae Handley, [phone]. Hospice/[facility] [phone]  DVT Prophylaxis: lovenox   GI Prophylaxis: not indicated  Baseline: PEG/Trach [facility]     Subjective:     Chief Complaint / Reason for Physician Visit  Patient seen this morning he still with high oxygen he requested morphine for pain he just feeling tired of being in the hospital  Discussed with RN events overnight.      Review of Systems:  Symptom Y/N Comments  Symptom Y/N Comments   Fever/Chills    Chest Pain     Poor Appetite    Edema     Cough    Abdominal Pain     Sputum    Joint Pain     SOB/CAI    Pruritis/Rash Nausea/vomit    Tolerating PT/OT     Diarrhea    Tolerating Diet     Constipation    Other       Could NOT obtain due to:      Objective:     VITALS:   Last 24hrs VS reviewed since prior progress note. Most recent are:  Patient Vitals for the past 24 hrs:   Temp Pulse Resp BP SpO2   01/14/23 1204 98.2 °F (36.8 °C) 94 16 91/62 --   01/14/23 1031 -- -- -- -- 96 %   01/14/23 0409 -- 84 12 -- 94 %   01/14/23 0315 97.9 °F (36.6 °C) 94 17 111/72 96 %   01/13/23 2349 -- 85 14 -- 95 %   01/13/23 2308 98.1 °F (36.7 °C) 91 14 108/72 90 %   01/13/23 2215 -- 89 -- (!) 91/59 --   01/13/23 2043 -- 90 16 -- 92 %   01/13/23 1957 97.5 °F (36.4 °C) 87 17 95/66 90 %   01/13/23 1600 98.3 °F (36.8 °C) 86 15 112/78 93 %         Intake/Output Summary (Last 24 hours) at 1/14/2023 1312  Last data filed at 1/14/2023 0315  Gross per 24 hour   Intake 145 ml   Output 725 ml   Net -580 ml          I had a face to face encounter and independently examined this patient on 1/14/2023, as outlined below:  PHYSICAL EXAM:  General: Alert, nad, cachetic  EENT:  EOMI. Anicteric sclerae. MMM  Resp:  Coarse,  no wheezing or rales. No accessory muscle use  CV:  Regular  rhythm,  No edema  GI:  Soft, Non distended, Non tender. +PEG  Neurologic:  Alert and oriented X 3, nod to questions  Psych:   Not anxious nor agitated  Skin:  No rashes. No jaundice    Reviewed most current lab test results and cultures  YES  Reviewed most current radiology test results   YES  Review and summation of old records today    NO  Reviewed patient's current orders and MAR    YES  PMH/SH reviewed - no change compared to H&P  ________________________________________________________________________  Care Plan discussed with:    Comments   Patient x    Family      RN x    Care Manager     Consultant  x Dr Chavo Forrester team rounds were held today with , nursing, pharmacist and clinical coordinator.   Patient's plan of care was discussed; medications were reviewed and discharge planning was addressed. ________________________________________________________________________  Total NON critical care TIME:  35   Minutes    Total CRITICAL CARE TIME Spent:   Minutes non procedure based      Comments   >50% of visit spent in counseling and coordination of care     ________________________________________________________________________  Gabriela Little MD     Procedures: see electronic medical records for all procedures/Xrays and details which were not copied into this note but were reviewed prior to creation of Plan. LABS:  I reviewed today's most current labs and imaging studies. Pertinent labs include:  Recent Labs     01/14/23 0223 01/13/23  0308 01/12/23  0210   WBC 11.3* 13.1* 9.2   HGB 8.5* 8.0* 7.2*   HCT 27.9* 27.0* 24.8*    225 193       Recent Labs     01/14/23 0223 01/13/23  0308 01/12/23  0210    138 139   K 3.3* 3.6 3.4*    100 102   CO2 31 31 34*   * 110* 89   BUN 13 16 21*   CREA 0.89 0.95 0.98   CA 8.1* 8.2* 7.9*   MG 1.9 1.9 2.0   PHOS 1.7* 1.5* 1.6*   ALB  --   --  2.3*   TBILI  --   --  0.7   ALT  --   --  14         Signed:  Gabriela Little MD

## 2023-01-14 NOTE — CONSULTS
Pulmonary, Critical Care, and Sleep Medicine    Patient Consult    Name: Llewellyn Klinefelter MRN: [id number]   : 1960 [facility]   Date: 2023          IMPRESSION:   Hypoxic respiratory failure on high flow via trach collar. On fio2 of 60% and 50L. 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 Had issues with tube feeds last pm. Had increased GERD. Discomfort. No darian aspiration was noted. Aspiration pneumonia -ON ABX; CXR from : reveals diffuse Airspace disease with more infiltrates on the Right lung. Influenza A+ 23, on Tamiflu. COVID negative  Hypotension-now better. PEG tube for nutrition  Prognosis guarded. DNR status. RECOMMENDATIONS:   Aspiration precautions with HOB always elevated, this needs to be strictly adhered to. If pt needs to lie flat for testing or procedure would hold   tube feeds for several hours. Will extend the Zosyn abx in light or recurrent aspiration and now suctioning vomitus from trach. Adjust oxygen to keep pox over 90%. Wean oxygen as able. Will follow. If he has an acute change would get a repeat CXR. On Enoxparin for DVT prophylaxis  On Midodrine 10mg po tid. Subjective:   Has been with increased issues with GERD. His tube feeds were placed on hold last pm due to   No chest pain. Has some thick secretions. He is to have tube feeds restarted. We discussed that he much keep his HOB elevated at all times. Has been feeling a little better. This patient has been seen and evaluated at the request of Dr. Mehnaz Young for hypoxia, flu / aspiration and tracheostomy status. Patient is a 57 y.o. male admitted here on  b/c of trach dislodgement. He was hypoxic and diagnosed with Influenza A. He's been on HF O2. During trach care / suctioning yesterday, he vomited and the RT witnessed an aspiration event. He's on Zosyn. He has a PEG tube.  He was in hospice care until he was admitted. Hypotension has been an issue and he's on midodrine and norepinephrine gtt. Pressures today ranging  systolic. Lactate has been normal.  WBC has fluctuated, being normal on  and now up to 18.2  Tmax 100.2 in the past 24 hours. 1/ paired blood cultures without growth      He is awake and wants iced tea. Denies chest pain or bothersome cough. ROS is limited to respiratory system due to pt's lack of verbal communication. Past Medical History:   Diagnosis Date    Cancer ([address]     Tongue and lung      Past Surgical History:   Procedure Laterality Date    HX OTHER SURGICAL      HX VASCULAR ACCESS      AL UNLISTED PROCEDURE ABDOMEN PERITONEUM & OMENTUM        Prior to Admission medications    Not on File     No Known Allergies   Social History     Tobacco Use    Smoking status: Not on file    Smokeless tobacco: Not on file   Substance Use Topics    Alcohol use: Not on file      History reviewed. No pertinent family history. Current Facility-Administered Medications   Medication Dose Route Frequency    piperacillin-tazobactam (ZOSYN) 3.375 g in 0.9% sodium chloride (MBP/ADV) 100 mL MBP  3.375 g IntraVENous Q8H    zinc oxide-cod liver oil (DESITIN) 40 % paste   Topical BID and QHS    sucralfate (CARAFATE) tablet 1 g  1 g Oral AC&HS    midodrine (PROAMATINE) tablet 10 mg  10 mg Oral TID    dextrose 5% infusion  50 mL/hr IntraVENous CONTINUOUS    sodium chloride (NS) flush 5-40 mL  5-40 mL IntraVENous Q8H    enoxaparin (LOVENOX) injection 40 mg  40 mg SubCUTAneous DAILY       Review of Systems:  Pertinent items are noted in HPI.     Objective:   Vital Signs:    Visit Vitals  /72 (BP 1 Location: Right upper arm, BP Patient Position: At rest)   Pulse 84   Temp 97.9 °F (36.6 °C)   Resp 12   Ht 6' 5\" (1.956 m)   Wt 54.6 kg (120 lb 5.9 oz)   SpO2 94%   BMI 14.27 kg/m²       O2 Device: Heated, Hi flow nasal cannula, Tracheostomy   O2 Flow Rate (L/min): (S) 40 l/min   Temp (24hrs), Av.1 °F (36.7 °C), Min:97.5 °F (36.4 °C), Max:98.6 °F (37 °C)       Intake/Output:   Last shift:      No intake/output data recorded. Last 3 shifts: 01/12 1901 - 01/14 0700  In: 195   Out: 1475 [Urine:1475]    Intake/Output Summary (Last 24 hours) at 1/14/2023 2417  Last data filed at 1/14/2023 0315  Gross per 24 hour   Intake 145 ml   Output 725 ml   Net -580 ml        Physical Exam:   General:  Cachectic, older man resting in bed in no distress. Seems comfortable. Head:  Normocephalic, without obvious abnormality, atraumatic. Eyes:  Conjunctivae/corneas clear. PERRL, EOMs intact. Nose: Nares normal.        Neck: Supple, symmetrical, tracheostomy with HF O2 entrained. Lungs:   He is breathing pretty comfortably,  No wheezing or rhonchi elsewhere. Has some decreased BS of the right lung base. Oxygenation has been stable. Heart:  Regular rate and rhythm, S1, S2 normal, no murmur, click, rub or gallop. Abdomen:   Soft, non-tender. PEG tube, his abdomen is soft. Extremities: Extremities normal, atraumatic, no cyanosis or edema. Skin: No rashes or lesions       Neurologic: Grossly nonfocal. He writes to communicate. Able use some hand gestures to communicate as well.  Alert and interactive when seen earlier this am.      Data review:     Recent Results (from the past 24 hour(s))   MAGNESIUM    Collection Time: 01/14/23  2:23 AM   Result Value Ref Range    Magnesium 1.9 1.6 - 2.4 mg/dL   PHOSPHORUS    Collection Time: 01/14/23  2:23 AM   Result Value Ref Range    Phosphorus 1.7 (L) 2.6 - 4.7 MG/DL   CBC W/O DIFF    Collection Time: 01/14/23  2:23 AM   Result Value Ref Range    WBC 11.3 (H) 4.1 - 11.1 K/uL    RBC 3.18 (L) 4.10 - 5.70 M/uL    HGB 8.5 (L) 12.1 - 17.0 g/dL    HCT 27.9 (L) 36.6 - 50.3 %    MCV 87.7 80.0 - 99.0 FL    MCH 26.7 26.0 - 34.0 PG    MCHC 30.5 30.0 - 36.5 g/dL    RDW 19.4 (H) 11.5 - 14.5 %    PLATELET 997 410 - 879 K/uL    MPV 11.2 8.9 - 12.9 FL    NRBC 0.0 0  WBC ABSOLUTE NRBC 0.00 0.00 - 7.28 K/uL   METABOLIC PANEL, BASIC    Collection Time: 01/14/23  2:23 AM   Result Value Ref Range    Sodium 137 136 - 145 mmol/L    Potassium 3.3 (L) 3.5 - 5.1 mmol/L    Chloride 100 97 - 108 mmol/L    CO2 31 21 - 32 mmol/L    Anion gap 6 5 - 15 mmol/L    Glucose 111 (H) 65 - 100 mg/dL    BUN 13 6 - 20 MG/DL    Creatinine 0.89 0.70 - 1.30 MG/DL    BUN/Creatinine ratio 15 12 - 20      eGFR >60 >60 ml/min/1.73m2    Calcium 8.1 (L) 8.5 - 10.1 MG/DL       Imaging:  I have personally reviewed the patients radiographs and have reviewed the reports:  CXR 1-9-2023:  with diffuse bilateral interstitial opacities. Increasing Right effusion. CXR: 1-11-23:COMPARISON: 1/10/2023     FINDINGS: Single AP portable view of the chest demonstrates no change in  position of the lines and tubes. The cardiomediastinal silhouette is unchanged. Diffuse interstitial and airspace opacities. No pneumothorax. IMPRESSION  No significant change. 1-13-23:   CXR: COMPARISON: 1/12/2023     FINDINGS: Single AP portable view of the chest demonstrates no change in  position of the lines and tubes. The cardiomediastinal silhouette is unchanged. Persistent diffuse interstitial and airspace opacities, most focal in the right  lung base. No pneumothorax. IMPRESSION  No significant change.        Gilberto Pfeiffer MD no loss of consciousness, no gait abnormality, no headache, no sensory deficits, and no weakness.

## 2023-01-14 NOTE — PROGRESS NOTES
1900H: Bedside shift change report given to [address] (oncoming nurse) by [employer] RN (offgoing nurse). Report included the following information SBAR, Kardex, Intake/Output, MAR, Recent Results, Med Rec Status, and Cardiac Rhythm SINUS RHYTHM WITH PAIR PVCs . 0150H: Telemetry called patient had 6 beats of Vtach on the telemoniotr. Assessed patient, sleeping calmly, denies chest pain. Informed NP [place], no new ordered added. End of Shift Note    Bedside shift change report given to [address] (oncoming nurse) by Nicho Madrid RN (offgoing nurse). Report included the following information SBAR, Kardex, Intake/Output, MAR, Recent Results, Med Rec Status, and Cardiac Rhythm SR    Shift worked:  [id number]     Shift summary and any significant changes:     Peg tube feeding held at 9pm; patient had complain of nausea and heartburn x 1, compazine PRN given as ordered. Concerns for physician to address:       Zone phone for oncoming shift:          Activity:  Activity Level: Bed Rest  Number times ambulated in hallways past shift: 0  Number of times OOB to chair past shift: 0    Cardiac:   Cardiac Monitoring: Yes      Cardiac Rhythm: Sinus Rhythm    Access:  Current line(s): port     Genitourinary:   Urinary status: pino    Respiratory:   O2 Device: Heated, Hi flow nasal cannula  Chronic home O2 use?: YES  Incentive spirometer at bedside: YES       GI:  Last Bowel Movement Date: 01/13/23  Current diet:  DIET NPO  ADULT TUBE FEEDING PEG; 2.0 Calorie; Delivery Method: Continuous; Continuous Initial Rate (mL/hr): 20; Continuous Advance Tube Feeding: Yes; Advancement Volume (mL/hr): 10; Advancement Frequency: Q 8 hours; Continuous Goal Rate (mL/hr): 60; Water . ..   Passing flatus: YES  Tolerating current diet: YES       Pain Management:   Patient states pain is manageable on current regimen: YES    Skin:  Edward Score: 14  Interventions: speciality bed, float heels, PT/OT consult, and internal/external urinary devices    Patient Safety:  Fall Score:  Total Score: 3  Interventions: bed/chair alarm, assistive device (walker, cane, etc), and gripper socks  High Fall Risk: Yes    Length of Stay:  Expected LOS: 4d 9h  Actual LOS: 7850 Armen Vaz RN

## 2023-01-15 LAB
ANION GAP SERPL CALC-SCNC: 3 MMOL/L (ref 5–15)
BUN SERPL-MCNC: 10 MG/DL (ref 6–20)
BUN/CREAT SERPL: 12 (ref 12–20)
CALCIUM SERPL-MCNC: 8.1 MG/DL (ref 8.5–10.1)
CHLORIDE SERPL-SCNC: 103 MMOL/L (ref 97–108)
CO2 SERPL-SCNC: 32 MMOL/L (ref 21–32)
CREAT SERPL-MCNC: 0.84 MG/DL (ref 0.7–1.3)
GLUCOSE SERPL-MCNC: 95 MG/DL (ref 65–100)
MAGNESIUM SERPL-MCNC: 1.8 MG/DL (ref 1.6–2.4)
PHOSPHATE SERPL-MCNC: 1.8 MG/DL (ref 2.6–4.7)
POTASSIUM SERPL-SCNC: 3.5 MMOL/L (ref 3.5–5.1)
SODIUM SERPL-SCNC: 138 MMOL/L (ref 136–145)

## 2023-01-15 PROCEDURE — 74011250637 HC RX REV CODE- 250/637: Performed by: INTERNAL MEDICINE

## 2023-01-15 PROCEDURE — 74011000250 HC RX REV CODE- 250: Performed by: STUDENT IN AN ORGANIZED HEALTH CARE EDUCATION/TRAINING PROGRAM

## 2023-01-15 PROCEDURE — C9113 INJ PANTOPRAZOLE SODIUM, VIA: HCPCS | Performed by: STUDENT IN AN ORGANIZED HEALTH CARE EDUCATION/TRAINING PROGRAM

## 2023-01-15 PROCEDURE — 36415 COLL VENOUS BLD VENIPUNCTURE: CPT

## 2023-01-15 PROCEDURE — 74011250636 HC RX REV CODE- 250/636: Performed by: STUDENT IN AN ORGANIZED HEALTH CARE EDUCATION/TRAINING PROGRAM

## 2023-01-15 PROCEDURE — 74011250636 HC RX REV CODE- 250/636: Performed by: GENERAL ACUTE CARE HOSPITAL

## 2023-01-15 PROCEDURE — 83735 ASSAY OF MAGNESIUM: CPT

## 2023-01-15 PROCEDURE — 74011250636 HC RX REV CODE- 250/636: Performed by: INTERNAL MEDICINE

## 2023-01-15 PROCEDURE — 84100 ASSAY OF PHOSPHORUS: CPT

## 2023-01-15 PROCEDURE — 74011250637 HC RX REV CODE- 250/637: Performed by: PHYSICIAN ASSISTANT

## 2023-01-15 PROCEDURE — 65270000046 HC RM TELEMETRY

## 2023-01-15 PROCEDURE — 74011000250 HC RX REV CODE- 250: Performed by: GENERAL ACUTE CARE HOSPITAL

## 2023-01-15 PROCEDURE — 80048 BASIC METABOLIC PNL TOTAL CA: CPT

## 2023-01-15 PROCEDURE — 77010033678 HC OXYGEN DAILY

## 2023-01-15 RX ADMIN — SUCRALFATE 1 G: 1 TABLET ORAL at 10:07

## 2023-01-15 RX ADMIN — SUCRALFATE 1 G: 1 TABLET ORAL at 12:00

## 2023-01-15 RX ADMIN — SODIUM CHLORIDE, PRESERVATIVE FREE 10 ML: 5 INJECTION INTRAVENOUS at 05:18

## 2023-01-15 RX ADMIN — Medication: at 18:23

## 2023-01-15 RX ADMIN — SUCRALFATE 1 G: 1 TABLET ORAL at 21:14

## 2023-01-15 RX ADMIN — MORPHINE SULFATE 2 MG: 2 INJECTION, SOLUTION INTRAMUSCULAR; INTRAVENOUS at 23:16

## 2023-01-15 RX ADMIN — MORPHINE SULFATE 2 MG: 2 INJECTION, SOLUTION INTRAMUSCULAR; INTRAVENOUS at 07:12

## 2023-01-15 RX ADMIN — Medication: at 10:07

## 2023-01-15 RX ADMIN — MIDODRINE HYDROCHLORIDE 10 MG: 5 TABLET ORAL at 21:14

## 2023-01-15 RX ADMIN — ENOXAPARIN SODIUM 40 MG: 100 INJECTION SUBCUTANEOUS at 10:05

## 2023-01-15 RX ADMIN — MORPHINE SULFATE 2 MG: 2 INJECTION, SOLUTION INTRAMUSCULAR; INTRAVENOUS at 21:14

## 2023-01-15 RX ADMIN — SODIUM CHLORIDE, PRESERVATIVE FREE 10 ML: 5 INJECTION INTRAVENOUS at 21:15

## 2023-01-15 RX ADMIN — MORPHINE SULFATE 2 MG: 2 INJECTION, SOLUTION INTRAMUSCULAR; INTRAVENOUS at 12:00

## 2023-01-15 RX ADMIN — MORPHINE SULFATE 2 MG: 2 INJECTION, SOLUTION INTRAMUSCULAR; INTRAVENOUS at 18:19

## 2023-01-15 RX ADMIN — MIDODRINE HYDROCHLORIDE 10 MG: 5 TABLET ORAL at 10:05

## 2023-01-15 RX ADMIN — OXYCODONE 5 MG: 5 TABLET ORAL at 19:54

## 2023-01-15 RX ADMIN — MORPHINE SULFATE 2 MG: 2 INJECTION, SOLUTION INTRAMUSCULAR; INTRAVENOUS at 10:21

## 2023-01-15 RX ADMIN — SODIUM CHLORIDE 40 MG: 9 INJECTION, SOLUTION INTRAMUSCULAR; INTRAVENOUS; SUBCUTANEOUS at 10:07

## 2023-01-15 RX ADMIN — OXYCODONE 5 MG: 5 TABLET ORAL at 11:14

## 2023-01-15 RX ADMIN — MIDODRINE HYDROCHLORIDE 10 MG: 5 TABLET ORAL at 16:00

## 2023-01-15 RX ADMIN — Medication: at 23:13

## 2023-01-15 RX ADMIN — SODIUM CHLORIDE, PRESERVATIVE FREE 10 ML: 5 INJECTION INTRAVENOUS at 15:43

## 2023-01-15 RX ADMIN — SUCRALFATE 1 G: 1 TABLET ORAL at 15:42

## 2023-01-15 NOTE — CONSULTS
Pulmonary, Critical Care, and Sleep Medicine    Patient Consult    Name: Fabby Terrazas MRN: [id number]   : 1960 Hospital: [facility]   Date: 1/15/2023          IMPRESSION:   Hypoxic respiratory failure on high flow via trach collar. 10L and fio2 of 50%. 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 Had issues with tube feeds last pm. Had increased GERD. Discomfort. No darian aspiration was noted. Aspiration pneumonia -ON ABX; CXR from : reveals diffuse Airspace disease with more infiltrates on the Right lung. Influenza A+ 23, on Tamiflu. COVID negative  Hypotension-now better. PEG tube for nutrition-still some ongoing issues with his tolerance of the tube feeds. DNR status. Discussed with nurse and RT this am.       RECOMMENDATIONS:   Aspiration precautions with HOB always elevated, this needs to be strictly adhered to. If pt needs to lie flat for testing or procedure would hold   tube feeds for several hours. Will extend the Zosyn abx in light or recurrent aspiration and now suctioning vomitus from trach. Adjust oxygen to keep pox over 90%. Wean oxygen as able. If he has an acute change would get a repeat CXR. On Enoxparin for DVT prophylaxis  On Midodrine 10mg po tid. Subjective:   Has been with increased issues with GERD, still ongoing. His tube feeds were placed on hold last pm due to aspiration. No chest pain. Has some thick secretions from the trach. He was gotten into the chair which is the first time this hospitalization. He does feel very weak. Has been feeling a little better. This patient has been seen and evaluated at the request of Dr. Ansley Corral for hypoxia, flu / aspiration and tracheostomy status. Patient is a 57 y.o. male admitted here on  b/c of trach dislodgement. He was hypoxic and diagnosed with Influenza A. He's been on HF O2.  During trach care / suctioning yesterday, he vomited and the RT witnessed an aspiration event. He's on Zosyn. He has a PEG tube. He was in hospice care until he was admitted. Hypotension has been an issue and he's on midodrine and norepinephrine gtt. Pressures today ranging  systolic. Lactate has been normal.  WBC has fluctuated, being normal on  and now up to 18.2  Tmax 100.2 in the past 24 hours.  paired blood cultures without growth      He is awake and wants iced tea. Denies chest pain or bothersome cough. ROS is limited to respiratory system due to pt's lack of verbal communication. Past Medical History:   Diagnosis Date    Cancer ([address]     Tongue and lung      Past Surgical History:   Procedure Laterality Date    HX OTHER SURGICAL      HX VASCULAR ACCESS      [facility] UNLISTED PROCEDURE ABDOMEN PERITONEUM & OMENTUM        Prior to Admission medications    Not on File     No Known Allergies   Social History     Tobacco Use    Smoking status: Not on file    Smokeless tobacco: Not on file   Substance Use Topics    Alcohol use: Not on file      History reviewed. No pertinent family history. Current Facility-Administered Medications   Medication Dose Route Frequency    pantoprazole (PROTONIX) 40 mg in 0.9% sodium chloride 10 mL injection  40 mg IntraVENous DAILY    zinc oxide-cod liver oil (DESITIN) 40 % paste   Topical BID and QHS    sucralfate (CARAFATE) tablet 1 g  1 g Oral AC&HS    midodrine (PROAMATINE) tablet 10 mg  10 mg Oral TID    dextrose 5% infusion  50 mL/hr IntraVENous CONTINUOUS    sodium chloride (NS) flush 5-40 mL  5-40 mL IntraVENous Q8H    enoxaparin (LOVENOX) injection 40 mg  40 mg SubCUTAneous DAILY       Review of Systems:  Pertinent items are noted in HPI.     Objective:   Vital Signs:    Visit Vitals  /61   Pulse 84   Temp 98 °F (36.7 °C)   Resp 11   Ht 6' 5\" (1.956 m)   Wt 54.6 kg (120 lb 5.9 oz)   SpO2 92%   BMI 14.27 kg/m²       O2 Device: Tracheal collar   O2 Flow Rate (L/min): 10 l/min   Temp (24hrs), Av °F (36.7 °C), Min:97.6 °F (36.4 °C), Max:98.2 °F (36.8 °C)       Intake/Output:   Last shift:      No intake/output data recorded. Last 3 shifts: 01/13 1901 - 01/15 0700  In: 80   Out: 1400 [Urine:1400]    Intake/Output Summary (Last 24 hours) at 1/15/2023 1139  Last data filed at 1/15/2023 0306  Gross per 24 hour   Intake 30 ml   Output 900 ml   Net -870 ml        Physical Exam:   General:  Cachectic, older man. Sitting up in chair at side of bed. Seems comfortable. Head:  Normocephalic, without obvious abnormality, atraumatic. Eyes:  Conjunctivae/corneas clear. PERRL, EOMs intact. Nose: Nares normal.        Neck: Supple, symmetrical, tracheostomy with HF O2 via trach collar. Lungs:   He is breathing pretty comfortably,  No wheezing or rhonchi elsewhere. Has some decreased BS of the right lung base. Oxygenation has been stable. He does require frequent suctioning. Heart:  Regular rate and rhythm, S1, S2 normal, no murmur, click, rub or gallop. Abdomen:   Soft, non-tender. PEG tube, his abdomen is soft. Tube feeds currently on hold. Extremities: Extremities normal, atraumatic, no cyanosis or edema. Skin: No rashes or lesions       Neurologic: Grossly nonfocal. He writes to communicate. Able use some hand gestures to communicate as well. Alert and interactive when seen earlier this am. Able to sit up in chair.       Data review:     Recent Results (from the past 24 hour(s))   MAGNESIUM    Collection Time: 01/15/23  4:41 AM   Result Value Ref Range    Magnesium 1.8 1.6 - 2.4 mg/dL   PHOSPHORUS    Collection Time: 01/15/23  4:41 AM   Result Value Ref Range    Phosphorus 1.8 (L) 2.6 - 4.7 MG/DL   METABOLIC PANEL, BASIC    Collection Time: 01/15/23  4:41 AM   Result Value Ref Range    Sodium 138 136 - 145 mmol/L    Potassium 3.5 3.5 - 5.1 mmol/L    Chloride 103 97 - 108 mmol/L    CO2 32 21 - 32 mmol/L    Anion gap 3 (L) 5 - 15 mmol/L    Glucose 95 65 - 100 mg/dL    BUN 10 6 - 20 MG/DL Creatinine 0.84 0.70 - 1.30 MG/DL    BUN/Creatinine ratio 12 12 - 20      eGFR >60 >60 ml/min/1.73m2    Calcium 8.1 (L) 8.5 - 10.1 MG/DL       Imaging:  I have personally reviewed the patients radiographs and have reviewed the reports:  CXR 1-9-2023:  with diffuse bilateral interstitial opacities. Increasing Right effusion. CXR: 1-11-23:COMPARISON: 1/10/2023     FINDINGS: Single AP portable view of the chest demonstrates no change in  position of the lines and tubes. The cardiomediastinal silhouette is unchanged. Diffuse interstitial and airspace opacities. No pneumothorax. IMPRESSION  No significant change. 1-13-23:   CXR: COMPARISON: 1/12/2023     FINDINGS: Single AP portable view of the chest demonstrates no change in  position of the lines and tubes. The cardiomediastinal silhouette is unchanged. Persistent diffuse interstitial and airspace opacities, most focal in the right  lung base. No pneumothorax. IMPRESSION  No significant change.        Mynor Cuello MD

## 2023-01-15 NOTE — PROGRESS NOTES
Problem: Aspiration - Risk of  Goal: *Absence of aspiration  Outcome: Progressing Towards Goal     Problem: Falls - Risk of  Goal: *Absence of Falls  Description: Document Yoselin Fall Risk and appropriate interventions in the flowsheet. Outcome: Progressing Towards Goal  Note: Fall Risk Interventions:  Mobility Interventions: Bed/chair exit alarm, OT consult for ADLs, PT Consult for mobility concerns    Mentation Interventions: Bed/chair exit alarm, More frequent rounding, Room close to nurse's station    Medication Interventions: Bed/chair exit alarm, Teach patient to arise slowly, Patient to call before getting OOB    Elimination Interventions: Bed/chair exit alarm, Call light in reach, Patient to call for help with toileting needs    History of Falls Interventions: Bed/chair exit alarm, Door open when patient unattended, Room close to nurse's station         Problem: Risk for Spread of Infection  Goal: Prevent transmission of infectious organism to others  Description: Prevent the transmission of infectious organisms to other patients, staff members, and visitors. Outcome: Progressing Towards Goal     Problem: Pressure Injury - Risk of  Goal: *Prevention of pressure injury  Description: Document Edward Scale and appropriate interventions in the flowsheet. Outcome: Progressing Towards Goal  Note: Pressure Injury Interventions:  Sensory Interventions: Assess need for specialty bed, Discuss PT/OT consult with provider, Float heels, Keep linens dry and wrinkle-free, Minimize linen layers, Monitor skin under medical devices, Turn and reposition approx.  every two hours (pillows and wedges if needed)    Moisture Interventions: Apply protective barrier, creams and emollients, Check for incontinence Q2 hours and as needed, Internal/External urinary devices, Maintain skin hydration (lotion/cream), Minimize layers, Moisture barrier    Activity Interventions: Assess need for specialty bed, Pressure redistribution bed/mattress(bed type), PT/OT evaluation    Mobility Interventions: Assess need for specialty bed, HOB 30 degrees or less, PT/OT evaluation, Turn and reposition approx.  every two hours(pillow and wedges)    Nutrition Interventions: Document food/fluid/supplement intake    Friction and Shear Interventions: Apply protective barrier, creams and emollients, HOB 30 degrees or less, Minimize layers, Feet elevated on foot rest

## 2023-01-15 NOTE — PROGRESS NOTES
Hospitalist Progress Note    NAME: Jayla Godinez   :  1960   MRN:  [id number]       Assessment / Plan:  Acute on chronic hypoxic respiratory failure  Trach dislodgement  Influenza A infection   H/o Head and neck Ca  Was on Hospice care. He revoked hospice upon admission    Hypernatremia/hyperchloremia/dehydration       Anemia  Patient had dark stool   No more episode  Hemoglobin stable at 8.5      Hypokalemia, replete with KCl  Aspiration pna with recurrent aspiration  RRT on  for low BP, pt had vomiting episode during RRT and thought top have aspirated. CXR showed increased interstitial lung opacities bilaterally with small right pleural effusion again noted. .    Cont' with IV abx, extend date due to recurrent aspiration  On midodrine tid. Off pressor support   Cont' with Tamiflu, nebs  Wean O2 as tolerated, currently 10 L on trach collar  Pt does not wish to be on the ventilator if he fails HF. Patient had event of possible aspiration again overnight, will held tube feeding again to Repeat CXR although no changes  During the day while he is awake  Will consult palliative care for support    Hypernatremia resolved      Replace electrolytes    HARVEY resolved      TAMICA:  currently on 10 L tarch collar      Code Status: DNR, was under hospice  Surrogate Decision Maker: Waleska Mckeon, [phone]. Hospice[facility] [phone]  DVT Prophylaxis: lovenox   GI Prophylaxis: not indicated  Baseline: PEG/Trach under hosp[facility] at [facility]     Subjective:     Chief Complaint / Reason for Physician Visit  Patient seen this morning, slightly doing better but feels tired and weak, patient is out of bed to chair today which is make him feel good a little bit, his oxygen requirement coming down to 2 L on trach collar  Discussed with RN events overnight.      Review of Systems:  Symptom Y/N Comments  Symptom Y/N Comments   Fever/Chills    Chest Pain     Poor Appetite    Edema     Cough    Abdominal Pain     Sputum    Joint Pain     SOB/CAI    Pruritis/Rash     Nausea/vomit    Tolerating PT/OT     Diarrhea    Tolerating Diet     Constipation    Other       Could NOT obtain due to:      Objective:     VITALS:   Last 24hrs VS reviewed since prior progress note. Most recent are:  Patient Vitals for the past 24 hrs:   Temp Pulse Resp BP SpO2   01/15/23 0800 98 °F (36.7 °C) 84 11 103/61 92 %   01/15/23 0306 97.8 °F (36.6 °C) 84 13 92/66 96 %   01/14/23 2330 97.6 °F (36.4 °C) 82 13 111/75 97 %   01/14/23 2213 -- 86 -- 109/74 --   01/14/23 2049 -- -- -- -- 99 %   01/14/23 2000 98.1 °F (36.7 °C) 82 12 94/68 94 %   01/14/23 1615 98 °F (36.7 °C) 91 16 (!) 88/62 98 %   01/14/23 1500 -- -- -- -- 97 %   01/14/23 1400 -- 97 13 104/77 98 %         Intake/Output Summary (Last 24 hours) at 1/15/2023 1312  Last data filed at 1/15/2023 4200  Gross per 24 hour   Intake 30 ml   Output 900 ml   Net -870 ml          I had a face to face encounter and independently examined this patient on 1/15/2023, as outlined below:  PHYSICAL EXAM:  General: Alert, nad, cachetic  EENT:  EOMI. Anicteric sclerae. MMM  Resp:  Coarse,  no wheezing or rales. No accessory muscle use  CV:  Regular  rhythm,  No edema  GI:  Soft, Non distended, Non tender. +PEG  Neurologic:  Alert and oriented X 3, nod to questions  Psych:   Not anxious nor agitated  Skin:  No rashes. No jaundice    Reviewed most current lab test results and cultures  YES  Reviewed most current radiology test results   YES  Review and summation of old records today    NO  Reviewed patient's current orders and MAR    YES  PMH/SH reviewed - no change compared to H&P  ________________________________________________________________________  Care Plan discussed with:    Comments   Patient x    Family      RN x    Care Manager     Consultant  x Dr Evert Flores team rounds were held today with , nursing, pharmacist and clinical coordinator.   Patient's plan of care was discussed; medications were reviewed and discharge planning was addressed. ________________________________________________________________________  Total NON critical care TIME:  35   Minutes    Total CRITICAL CARE TIME Spent:   Minutes non procedure based      Comments   >50% of visit spent in counseling and coordination of care     ________________________________________________________________________  Jeffery Gibbs MD     Procedures: see electronic medical records for all procedures/Xrays and details which were not copied into this note but were reviewed prior to creation of Plan. LABS:  I reviewed today's most current labs and imaging studies. Pertinent labs include:  Recent Labs     01/14/23 0223 01/13/23  0308   WBC 11.3* 13.1*   HGB 8.5* 8.0*   HCT 27.9* 27.0*    225       Recent Labs     01/15/23  0441 01/14/23 0223 01/13/23  0308    137 138   K 3.5 3.3* 3.6    100 100   CO2 32 31 31   GLU 95 111* 110*   BUN 10 13 16   CREA 0.84 0.89 0.95   CA 8.1* 8.1* 8.2*   MG 1.8 1.9 1.9   PHOS 1.8* 1.7* 1.5*         Signed:  Jeffery Gibbs MD

## 2023-01-15 NOTE — PROGRESS NOTES
1900H: Bedside shift change report given to [address]iana [address] (oncoming nurse) by [employer] RN (offgoing nurse). Report included the following information SBAR, Kardex, Intake/Output, MAR, Recent Results, Med Rec Status, and Cardiac Rhythm SINUS RHYTHM . End of Shift Note    Bedside shift change report given to Yordan Th[address] (oncoming nurse) by Karen Morejon RN (offgoing nurse). Report included the following information SBAR, Kardex, Intake/Output, MAR, Recent Results, Med Rec Status, and Cardiac Rhythm SINUS RHYTHM    Shift worked:  [id number]     Shift summary and any significant changes: On pain management; tolerating pain meds. Concerns for physician to address:       Zone phone for oncoming shift:          Activity:  Activity Level: Bed Rest  Number times ambulated in hallways past shift: 0  Number of times OOB to chair past shift: 0    Cardiac:   Cardiac Monitoring: Yes      Cardiac Rhythm: Sinus Rhythm    Access:  Current line(s): port     Genitourinary:   Urinary status: pino    Respiratory:   O2 Device: Tracheal collar  Chronic home O2 use?: YES  Incentive spirometer at bedside: N/A       GI:  Last Bowel Movement Date: 01/14/23  Current diet:  DIET NPO  ADULT TUBE FEEDING PEG; 2.0 Calorie; Delivery Method: Continuous; Continuous Initial Rate (mL/hr): 20; Continuous Advance Tube Feeding: Yes; Advancement Volume (mL/hr): 10; Advancement Frequency: Q 8 hours; Continuous Goal Rate (mL/hr): 60; Water . .. Passing flatus: YES  Tolerating current diet: YES       Pain Management:   Patient states pain is manageable on current regimen: YES    Skin:  Edward Score: 14  Interventions: speciality bed, float heels, and PT/OT consult    Patient Safety:  Fall Score:  Total Score: 3  Interventions: bed/chair alarm, assistive device (walker, cane, etc), and gripper socks  High Fall Risk: Yes    Length of Stay:  Expected LOS: 4d 9h  Actual LOS: [address] [facility] [address], [address] [facility]

## 2023-01-16 ENCOUNTER — APPOINTMENT (OUTPATIENT)
Dept: GENERAL RADIOLOGY | Age: 63
DRG: 143 | End: 2023-01-16
Attending: INTERNAL MEDICINE
Payer: MEDICAID

## 2023-01-16 LAB
MAGNESIUM SERPL-MCNC: 1.8 MG/DL (ref 1.6–2.4)
PHOSPHATE SERPL-MCNC: 2 MG/DL (ref 2.6–4.7)

## 2023-01-16 PROCEDURE — 83735 ASSAY OF MAGNESIUM: CPT

## 2023-01-16 PROCEDURE — 74011250636 HC RX REV CODE- 250/636: Performed by: INTERNAL MEDICINE

## 2023-01-16 PROCEDURE — 65270000046 HC RM TELEMETRY

## 2023-01-16 PROCEDURE — 36415 COLL VENOUS BLD VENIPUNCTURE: CPT

## 2023-01-16 PROCEDURE — C9113 INJ PANTOPRAZOLE SODIUM, VIA: HCPCS | Performed by: STUDENT IN AN ORGANIZED HEALTH CARE EDUCATION/TRAINING PROGRAM

## 2023-01-16 PROCEDURE — 74011250636 HC RX REV CODE- 250/636: Performed by: GENERAL ACUTE CARE HOSPITAL

## 2023-01-16 PROCEDURE — 74011250637 HC RX REV CODE- 250/637: Performed by: INTERNAL MEDICINE

## 2023-01-16 PROCEDURE — 84100 ASSAY OF PHOSPHORUS: CPT

## 2023-01-16 PROCEDURE — 74011250637 HC RX REV CODE- 250/637: Performed by: PHYSICIAN ASSISTANT

## 2023-01-16 PROCEDURE — 74011000250 HC RX REV CODE- 250: Performed by: GENERAL ACUTE CARE HOSPITAL

## 2023-01-16 PROCEDURE — 74011000250 HC RX REV CODE- 250: Performed by: STUDENT IN AN ORGANIZED HEALTH CARE EDUCATION/TRAINING PROGRAM

## 2023-01-16 PROCEDURE — 77010033678 HC OXYGEN DAILY

## 2023-01-16 PROCEDURE — 71045 X-RAY EXAM CHEST 1 VIEW: CPT

## 2023-01-16 PROCEDURE — 74011250636 HC RX REV CODE- 250/636: Performed by: STUDENT IN AN ORGANIZED HEALTH CARE EDUCATION/TRAINING PROGRAM

## 2023-01-16 RX ORDER — MIDODRINE HYDROCHLORIDE 10 MG/1
10 TABLET ORAL 3 TIMES DAILY
Qty: 90 TABLET | Refills: 0 | Status: SHIPPED
Start: 2023-01-16 | End: 2023-02-15

## 2023-01-16 RX ADMIN — Medication: at 21:28

## 2023-01-16 RX ADMIN — MORPHINE SULFATE 2 MG: 2 INJECTION, SOLUTION INTRAMUSCULAR; INTRAVENOUS at 17:59

## 2023-01-16 RX ADMIN — MORPHINE SULFATE 2 MG: 2 INJECTION, SOLUTION INTRAMUSCULAR; INTRAVENOUS at 12:43

## 2023-01-16 RX ADMIN — ENOXAPARIN SODIUM 40 MG: 100 INJECTION SUBCUTANEOUS at 09:49

## 2023-01-16 RX ADMIN — SODIUM CHLORIDE, PRESERVATIVE FREE 10 ML: 5 INJECTION INTRAVENOUS at 21:28

## 2023-01-16 RX ADMIN — SUCRALFATE 1 G: 1 TABLET ORAL at 09:48

## 2023-01-16 RX ADMIN — MORPHINE SULFATE 2 MG: 2 INJECTION, SOLUTION INTRAMUSCULAR; INTRAVENOUS at 03:18

## 2023-01-16 RX ADMIN — SODIUM CHLORIDE 40 MG: 9 INJECTION, SOLUTION INTRAMUSCULAR; INTRAVENOUS; SUBCUTANEOUS at 09:48

## 2023-01-16 RX ADMIN — MORPHINE SULFATE 2 MG: 2 INJECTION, SOLUTION INTRAMUSCULAR; INTRAVENOUS at 22:37

## 2023-01-16 RX ADMIN — MORPHINE SULFATE 2 MG: 2 INJECTION, SOLUTION INTRAMUSCULAR; INTRAVENOUS at 20:35

## 2023-01-16 RX ADMIN — SUCRALFATE 1 G: 1 TABLET ORAL at 18:05

## 2023-01-16 RX ADMIN — Medication: at 18:40

## 2023-01-16 RX ADMIN — DEXTROSE MONOHYDRATE 50 ML/HR: 50 INJECTION, SOLUTION INTRAVENOUS at 06:49

## 2023-01-16 RX ADMIN — MIDODRINE HYDROCHLORIDE 10 MG: 5 TABLET ORAL at 18:04

## 2023-01-16 RX ADMIN — Medication: at 09:49

## 2023-01-16 RX ADMIN — SODIUM CHLORIDE, PRESERVATIVE FREE 10 ML: 5 INJECTION INTRAVENOUS at 06:43

## 2023-01-16 RX ADMIN — SUCRALFATE 1 G: 1 TABLET ORAL at 12:43

## 2023-01-16 RX ADMIN — SODIUM CHLORIDE, PRESERVATIVE FREE 10 ML: 5 INJECTION INTRAVENOUS at 15:04

## 2023-01-16 RX ADMIN — MIDODRINE HYDROCHLORIDE 10 MG: 5 TABLET ORAL at 21:28

## 2023-01-16 RX ADMIN — MORPHINE SULFATE 2 MG: 2 INJECTION, SOLUTION INTRAMUSCULAR; INTRAVENOUS at 14:59

## 2023-01-16 RX ADMIN — SUCRALFATE 1 G: 1 TABLET ORAL at 21:28

## 2023-01-16 RX ADMIN — MIDODRINE HYDROCHLORIDE 10 MG: 5 TABLET ORAL at 09:48

## 2023-01-16 RX ADMIN — MORPHINE SULFATE 2 MG: 2 INJECTION, SOLUTION INTRAMUSCULAR; INTRAVENOUS at 09:48

## 2023-01-16 RX ADMIN — OXYCODONE 5 MG: 5 TABLET ORAL at 14:59

## 2023-01-16 NOTE — PROGRESS NOTES
Pulmonary, Critical Care, and Sleep Medicine    Name: Mehnaz Smith MRN: [id number]   : 1960 Hospital: [facility]   Date: 2023          IMPRESSION:   Hypoxic respiratory failure on high flow via trach collar. 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 Having persistent/recurrent issues with tube feeds with reflux and aspiration  Aspiration pneumonia -ON ABX; CXR from : reveals diffuse Airspace disease with more infiltrates on the Right lung. Influenza A+ 23, on Tamiflu. COVID negative  Hypotension-now better. PEG tube for nutrition-still some ongoing issues with his tolerance of the tube feeds. DNR status. Was on hospice prior to admission      RECOMMENDATIONS:   O2 - wean as tolerated  Aspiration precautions with HOB always elevated, this needs to be strictly adhered to. If pt needs to lie flat for testing or procedure would hold  tube feeds for several hours. IV antibiotics   On Midodrine 10mg po tid. Challenging situation. If he has ongoing issues with aspiration, may need jejunostomy for feeding, but he was also on hospice prior to admission, so this may be overly aggressive. I do think this will be a recurrent situation if he continues tube feeds from his current PEG     Subjective:     23: no events. Weaning O2, now down to 50% via TC. Nursing reports that every time tube feeds are restarted, he aspirates. Initial history: This patient has been seen and evaluated at the request of Dr. Migdalia Baker for hypoxia, flu / aspiration and tracheostomy status. Patient is a 57 y.o. male admitted here on  b/c of trach dislodgement. He was hypoxic and diagnosed with Influenza A. He's been on HF O2. During trach care / suctioning yesterday, he vomited and the RT witnessed an aspiration event. He's on Zosyn. He has a PEG tube. He was in hospice care until he was admitted.   Hypotension has been an issue and he's on midodrine and norepinephrine gtt. Pressures today ranging  systolic. Lactate has been normal.  WBC has fluctuated, being normal on  and now up to 18.2  Tmax 100.2 in the past 24 hours. / paired blood cultures without growth      He is awake and wants iced tea. Denies chest pain or bothersome cough. ROS is limited to respiratory system due to pt's lack of verbal communication. Past Medical History:   Diagnosis Date    Cancer ([address]     Tongue and lung      Past Surgical History:   Procedure Laterality Date    HX OTHER SURGICAL      HX VASCULAR ACCESS      [facility] UNLISTED PROCEDURE ABDOMEN PERITONEUM & OMENTUM        Prior to Admission medications    Not on File     No Known Allergies   Social History     Tobacco Use    Smoking status: Not on file    Smokeless tobacco: Not on file   Substance Use Topics    Alcohol use: Not on file      History reviewed. No pertinent family history. Current Facility-Administered Medications   Medication Dose Route Frequency    pantoprazole (PROTONIX) 40 mg in 0.9% sodium chloride 10 mL injection  40 mg IntraVENous DAILY    zinc oxide-cod liver oil (DESITIN) 40 % paste   Topical BID and QHS    sucralfate (CARAFATE) tablet 1 g  1 g Oral AC&HS    midodrine (PROAMATINE) tablet 10 mg  10 mg Oral TID    dextrose 5% infusion  50 mL/hr IntraVENous CONTINUOUS    sodium chloride (NS) flush 5-40 mL  5-40 mL IntraVENous Q8H    enoxaparin (LOVENOX) injection 40 mg  40 mg SubCUTAneous DAILY       Review of Systems:  Pertinent items are noted in HPI.     Objective:   Vital Signs:    Visit Vitals  BP 91/73 (BP 1 Location: Right upper arm, BP Patient Position: At rest)   Pulse 81   Temp 98.1 °F (36.7 °C)   Resp 9 Comment: pt sleeping   Ht 6' 5\" (1.956 m)   Wt 54.6 kg (120 lb 5.9 oz)   SpO2 95%   BMI 14.27 kg/m²       O2 Device: Tracheal collar   O2 Flow Rate (L/min): 10 l/min   Temp (24hrs), Av.2 °F (36.8 °C), Min:98 °F (36.7 °C), Max:98.7 °F (37.1 °C)       Intake/Output: Last shift:      No intake/output data recorded. Last 3 shifts: 01/14 1901 - 01/16 0700  In: 1029 [I.V.:999]  Out: 2300 [Urine:2300]    Intake/Output Summary (Last 24 hours) at 1/16/2023 1240  Last data filed at 1/16/2023 8814  Gross per 24 hour   Intake 999 ml   Output 1400 ml   Net -401 ml        Physical Exam:   General:  Cachectic, NAD   Head:  Normocephalic, without obvious abnormality, atraumatic. Eyes:  Conjunctivae/corneas clear. PERRL, EOMs intact. Nose: Nares normal.    Neck: Supple, symmetrical, tracheostomy with HF O2 via trach collar. Lungs:   Scattered ronchi    Heart:  Regular rate and rhythm    Abdomen:   Soft, non-tender. PEG tube, his abdomen is soft. Tube feeds currently on hold. Extremities: Extremities normal, atraumatic, no cyanosis    Skin: No rashes or lesions   Neurologic: Grossly nonfocal.       Data:   Labs:  Recent Labs     01/14/23  0223   WBC 11.3*   HGB 8.5*   HCT 27.9*        Recent Labs     01/16/23  0323 01/15/23  0441 01/14/23  0223   NA  --  138 137   K  --  3.5 3.3*   CL  --  103 100   CO2  --  32 31   GLU  --  95 111*   BUN  --  10 13   CREA  --  0.84 0.89   CA  --  8.1* 8.1*   MG 1.8 1.8 1.9   PHOS 2.0* 1.8* 1.7*     No results for input(s): PHI, PCO2I, PO2I, HCO3I, FIO2I in the last 72 hours.     Imaging:  I have personally reviewed the patients radiographs:  None today        Taran Blanco MD

## 2023-01-16 NOTE — ROUTINE PROCESS
RAPID RESPONSE TEAM    Overhead rapid response paged to room # 2244/01  at  [phone]    Reason for rapid response:  trach removal    Initial assessment:   Upon my arrival, RT was bedside with primary RN. Patient had removed his trach. Primary RN states she is unsure why this happened. RT placed a new #4 cuffed trach which the patient tolerated well. Patient showed no signs of distress. Patient was suctioned by primary RN      Patient Vitals for the past 4 hrs:   Temp Pulse Resp BP SpO2   01/16/23 1756 -- -- -- -- 93 %   01/16/23 1600 98 °F (36.7 °C) 80 17 104/69 96 %        Dr. Olu Tobin P bedside    Interventions:   -RT placed new #4 cuffed trach  - patient suctioned    Outcome: pt to remain in 2244/01     Please call with any questions or concerns    Annalisa Andrade RN  Rapid Response Team  Ext [phone]     No results found for this or any previous visit (from the past 8 hour(s)).

## 2023-01-16 NOTE — PROGRESS NOTES
Transition of Care Plan:    RUR: 14%- Medium Risk  Disposition: Return to [facility] with Leah Porras -[phone]  Follow up appointments: at facility  DME needed:   Transportation at Discharge: CM to arrange   Keys or means to access home:  at facility    IM Medicare Letter: 2nd Im  Letter at d/c  Caregiver Contact: Lori Mo- [phone]  Discharge Caregiver contacted prior to discharge? If pt desires  Care Conference needed?:     n/a    CM reviewed pt chart. Pt on 10L HF at 50% oxygen. CM reached out to [facility] to determine if they could accept  pt on oxygen settings.  for return call.     [address]  Phone: [phone]

## 2023-01-16 NOTE — PROGRESS NOTES
1747 pt noticed sitting up trying to get out of bed. Pt had removed his trach. Floor was alerted and respiratory was contacted. New trach was inserted. End of Shift Note    Bedside shift change report given to Krupa  (oncoming nurse) by Sandra Dooley RN (offgoing nurse). Report included the following information SBAR, Kardex, and MAR    Shift worked:  7a-7p     Shift summary and any significant changes:     Trach replaced due to removal by pt     Concerns for physician to address:       Zone phone for oncoming shift:          Activity:  Activity Level: Bed Rest  Number times ambulated in hallways past shift: 0  Number of times OOB to chair past shift: 0    Cardiac:   Cardiac Monitoring: Yes      Cardiac Rhythm: Sinus Rhythm    Access:  Current line(s): central line     Genitourinary:   Urinary status: pino    Respiratory:   O2 Device: Tracheal collar  Chronic home O2 use?: YES  Incentive spirometer at bedside: NO       GI:  Last Bowel Movement Date: 01/14/23  Current diet:  DIET NPO  ADULT TUBE FEEDING PEG; 2.0 Calorie; Delivery Method: Continuous; Continuous Initial Rate (mL/hr): 20; Continuous Advance Tube Feeding: Yes; Advancement Volume (mL/hr): 10; Advancement Frequency: Q 8 hours; Continuous Goal Rate (mL/hr): 60; Water . .. Passing flatus: yes  Tolerating current diet: NPO       Pain Management:   Patient states pain is manageable on current regimen: NO    Skin:  Edward Score: 12  Interventions: turn team and PT/OT consult    Patient Safety:  Fall Score:  Total Score: 3  Interventions: bed/chair alarm, gripper socks, and pt to call before getting OOB  High Fall Risk: Yes    Length of Stay:  Expected LOS: 4d 9h  Actual LOS: 12      Sandra Dooley RN

## 2023-01-16 NOTE — PROGRESS NOTES
Pulse oximetry assessment   97% at rest on room air (if 88% or less, skip next steps)  97% while ambulating on room air

## 2023-01-16 NOTE — PROGRESS NOTES
Called to patient, he removed his trach. A new #4 cuffed trach was inserted. Equal breath sounds. Patient tolerated well.

## 2023-01-16 NOTE — CONSULTS
Palliative Medicine  [phone]    Due to census we will see this patient tomorrow    Beryle Schneider, NP

## 2023-01-16 NOTE — PROGRESS NOTES
Comprehensive Nutrition Assessment    Type and Reason for Visit: Reassess    Nutrition Recommendations/Plan:   NPO  Defer to hospice on whether to continue TF or not     Nutrition Assessment:    Chart reviewed; patient's TF has been off and on throughout the weekend with episodes of recurrent aspiration. Rate hasn't been above 20 mL/hr. Plans are to resume hospice on discharge. Would recommend continuing NPO status for now. Defer to hospice on whether to resume TF. For possible discharge back to LTC  today per discussion on PCU rounds. Malnutrition Assessment:  Malnutrition Status:  Severe malnutrition (01/05/23 1106)    Context:  Chronic illness     Findings of the 6 clinical characteristics of malnutrition:   Energy Intake:  75% or less est energy requirements for 1 month or longer  Weight Loss:  Unable to assess     Body Fat Loss:  Severe body fat loss, Triceps, Fat overlying ribs, Buccal region   Muscle Mass Loss:  Severe muscle mass loss, Temples (temporalis), Clavicles (pectoralis &deltoids), Calf (gastrocnemius)  Fluid Accumulation:  No significant fluid accumulation,     Strength:  Not performed     Nutrition Related Findings:    phos 2.0, BG 95   Midodrine, Protonix, Carafate   BM 1/14   Wound Type: Moisture associate skin damage    Current Nutrition Intake & Therapies:  NPO -TF on hold    Anthropometric Measures:  Height: 6' 5\" (195.6 cm)  Ideal Body Weight (IBW): 208 lbs (95 kg)     Current Body Wt:  54.6 kg (120 lb 5.9 oz), 56.9 % IBW. Current BMI (kg/m2): 14.3                          BMI Category: Underweight (BMI less than 18.5)    Estimated Daily Nutrient Needs:  Energy Requirements Based On: Formula  Weight Used for Energy Requirements: Current  Energy (kcal/day): 1890 kcals (BMR x 1. 3AF)  Weight Used for Protein Requirements: Current  Protein (g/day): 82g (1.5 g/kg bw)  Method Used for Fluid Requirements: 1 ml/kcal  Fluid (ml/day): 1900 mL    Nutrition Diagnosis:   Inadequate protein-energy intake related to impaired respiratory function as evidenced by NPO or clear liquid status due to medical condition    Nutrition Interventions:   Food and/or Nutrient Delivery: Continue NPO  Nutrition Education/Counseling: No recommendations at this time  Coordination of Nutrition Care: Continue to monitor while inpatient       Goals:  Previous Goal Met: No progress toward goal(s)  Goals:  Tolerate nutrition support at goal rate, by next RD assessment       Nutrition Monitoring and Evaluation:   Behavioral-Environmental Outcomes: None identified  Food/Nutrient Intake Outcomes: Enteral nutrition intake/tolerance  Physical Signs/Symptoms Outcomes: Biochemical data, Weight, Hemodynamic status    Discharge Planning:    Enteral nutrition vs NPO    Jeannie Yoder RD  Contact: ext [phone]

## 2023-01-17 PROCEDURE — 74011250637 HC RX REV CODE- 250/637: Performed by: PHYSICIAN ASSISTANT

## 2023-01-17 PROCEDURE — 74011250636 HC RX REV CODE- 250/636: Performed by: INTERNAL MEDICINE

## 2023-01-17 PROCEDURE — C9113 INJ PANTOPRAZOLE SODIUM, VIA: HCPCS | Performed by: STUDENT IN AN ORGANIZED HEALTH CARE EDUCATION/TRAINING PROGRAM

## 2023-01-17 PROCEDURE — 74011250636 HC RX REV CODE- 250/636: Performed by: GENERAL ACUTE CARE HOSPITAL

## 2023-01-17 PROCEDURE — 74011250637 HC RX REV CODE- 250/637: Performed by: INTERNAL MEDICINE

## 2023-01-17 PROCEDURE — 74011250636 HC RX REV CODE- 250/636: Performed by: STUDENT IN AN ORGANIZED HEALTH CARE EDUCATION/TRAINING PROGRAM

## 2023-01-17 PROCEDURE — 65270000046 HC RM TELEMETRY

## 2023-01-17 PROCEDURE — 74011000250 HC RX REV CODE- 250: Performed by: GENERAL ACUTE CARE HOSPITAL

## 2023-01-17 PROCEDURE — 77010033678 HC OXYGEN DAILY

## 2023-01-17 PROCEDURE — 74011000250 HC RX REV CODE- 250: Performed by: STUDENT IN AN ORGANIZED HEALTH CARE EDUCATION/TRAINING PROGRAM

## 2023-01-17 RX ADMIN — Medication: at 22:54

## 2023-01-17 RX ADMIN — MORPHINE SULFATE 2 MG: 2 INJECTION, SOLUTION INTRAMUSCULAR; INTRAVENOUS at 23:23

## 2023-01-17 RX ADMIN — MORPHINE SULFATE 2 MG: 2 INJECTION, SOLUTION INTRAMUSCULAR; INTRAVENOUS at 17:24

## 2023-01-17 RX ADMIN — MORPHINE SULFATE 2 MG: 2 INJECTION, SOLUTION INTRAMUSCULAR; INTRAVENOUS at 11:30

## 2023-01-17 RX ADMIN — Medication: at 18:37

## 2023-01-17 RX ADMIN — SUCRALFATE 1 G: 1 TABLET ORAL at 17:23

## 2023-01-17 RX ADMIN — OXYCODONE 5 MG: 5 TABLET ORAL at 18:37

## 2023-01-17 RX ADMIN — SUCRALFATE 1 G: 1 TABLET ORAL at 22:53

## 2023-01-17 RX ADMIN — SODIUM CHLORIDE, PRESERVATIVE FREE 10 ML: 5 INJECTION INTRAVENOUS at 22:54

## 2023-01-17 RX ADMIN — MORPHINE SULFATE 2 MG: 2 INJECTION, SOLUTION INTRAMUSCULAR; INTRAVENOUS at 04:16

## 2023-01-17 RX ADMIN — SUCRALFATE 1 G: 1 TABLET ORAL at 08:34

## 2023-01-17 RX ADMIN — SODIUM CHLORIDE 40 MG: 9 INJECTION, SOLUTION INTRAMUSCULAR; INTRAVENOUS; SUBCUTANEOUS at 08:33

## 2023-01-17 RX ADMIN — MIDODRINE HYDROCHLORIDE 10 MG: 5 TABLET ORAL at 22:53

## 2023-01-17 RX ADMIN — ENOXAPARIN SODIUM 40 MG: 100 INJECTION SUBCUTANEOUS at 08:35

## 2023-01-17 RX ADMIN — MIDODRINE HYDROCHLORIDE 10 MG: 5 TABLET ORAL at 17:23

## 2023-01-17 RX ADMIN — SUCRALFATE 1 G: 1 TABLET ORAL at 11:49

## 2023-01-17 RX ADMIN — MORPHINE SULFATE 2 MG: 2 INJECTION, SOLUTION INTRAMUSCULAR; INTRAVENOUS at 02:05

## 2023-01-17 RX ADMIN — SODIUM CHLORIDE, PRESERVATIVE FREE 10 ML: 5 INJECTION INTRAVENOUS at 17:24

## 2023-01-17 RX ADMIN — Medication: at 08:38

## 2023-01-17 RX ADMIN — MIDODRINE HYDROCHLORIDE 10 MG: 5 TABLET ORAL at 08:35

## 2023-01-17 RX ADMIN — OXYCODONE 5 MG: 5 TABLET ORAL at 13:03

## 2023-01-17 RX ADMIN — DEXTROSE MONOHYDRATE 50 ML/HR: 50 INJECTION, SOLUTION INTRAVENOUS at 02:09

## 2023-01-17 RX ADMIN — DEXTROSE MONOHYDRATE 50 ML/HR: 50 INJECTION, SOLUTION INTRAVENOUS at 23:25

## 2023-01-17 RX ADMIN — SODIUM CHLORIDE, PRESERVATIVE FREE 10 ML: 5 INJECTION INTRAVENOUS at 05:26

## 2023-01-17 RX ADMIN — MORPHINE SULFATE 2 MG: 2 INJECTION, SOLUTION INTRAMUSCULAR; INTRAVENOUS at 20:25

## 2023-01-17 NOTE — PROGRESS NOTES
Hospitalist Progress Note    NAME: Reina Blanco   :  1960   MRN:  [id number]       Assessment / Plan:  Acute on chronic hypoxic respiratory failure  Trach dislodgement  Influenza A infection   H/o Head and neck Ca  Was on Hospice care. He revoked hospice upon admission    Hypernatremia/hyperchloremia/dehydration       Anemia  Patient had dark stool   No more episode  Hemoglobin stable at 8.5      Hypokalemia, replete with KCl  Aspiration pna with recurrent aspiration  RRT on  for low BP, pt had vomiting episode during RRT and thought top have aspirated. CXR showed increased interstitial lung opacities bilaterally with small right pleural effusion again noted. .    Cont' with IV abx, extend date due to recurrent aspiration  On midodrine tid. Off pressor support   Cont' with Tamiflu, nebs  Wean O2 as tolerated, currently 10 L on trach collar  Pt does not wish to be on the ventilator if he fails HF. Patient had event of possible aspiration again overnight, will held tube feeding again to Repeat CXR although no changes  During the day while he is awake  Will consult palliative care for support    Hypernatremia resolved      Replace electrolytes    HARVEY resolved      TAMICA:  currently on 10 L tarch collar, stable already discharged       Code Status: DNR, was under hospice  Surrogate Decision Maker: Shelly Diaz, [phone]. Hospice/[facility] [phone]  DVT Prophylaxis: lovenox   GI Prophylaxis: not indicated  Baseline: PEG/Trach under [facility] at [facility]     Subjective:     Chief Complaint / Reason for Physician Visit  Patient seen this morning, slightly doing better but feels tired and weak, patient is out of bed to chair today which is make him feel good a little bit, his oxygen requirement coming down to 2 L on trach collar  Discussed with RN events overnight.      Review of Systems:  Symptom Y/N Comments  Symptom Y/N Comments   Fever/Chills    Chest Pain     Poor Appetite Edema     Cough    Abdominal Pain     Sputum    Joint Pain     SOB/CAI    Pruritis/Rash     Nausea/vomit    Tolerating PT/OT     Diarrhea    Tolerating Diet     Constipation    Other       Could NOT obtain due to:      Objective:     VITALS:   Last 24hrs VS reviewed since prior progress note. Most recent are:  Patient Vitals for the past 24 hrs:   Temp Pulse Resp BP SpO2   01/17/23 0835 -- 97 -- 96/67 --   01/17/23 0736 97.3 °F (36.3 °C) 79 13 112/79 98 %   01/17/23 0400 98.6 °F (37 °C) 71 8 104/68 97 %   01/17/23 0154 -- 77 12 -- 99 %   01/17/23 0000 98.5 °F (36.9 °C) 84 11 121/78 96 %   01/16/23 2029 -- -- -- -- 92 %   01/16/23 2000 98.4 °F (36.9 °C) 82 12 95/75 92 %   01/16/23 1756 -- -- -- -- 93 %   01/16/23 1600 98 °F (36.7 °C) 80 17 104/69 96 %   01/16/23 1208 -- -- -- -- 95 %         Intake/Output Summary (Last 24 hours) at 1/17/2023 1146  Last data filed at 1/17/2023 0848  Gross per 24 hour   Intake 75 ml   Output 800 ml   Net -725 ml          I had a face to face encounter and independently examined this patient on 1/17/2023, as outlined below:  PHYSICAL EXAM:  General: Alert, nad, cachetic  EENT:  EOMI. Anicteric sclerae. MMM  Resp:  Coarse,  no wheezing or rales. No accessory muscle use  CV:  Regular  rhythm,  No edema  GI:  Soft, Non distended, Non tender. +PEG  Neurologic:  Alert and oriented X 3, nod to questions  Psych:   Not anxious nor agitated  Skin:  No rashes.   No jaundice    Reviewed most current lab test results and cultures  YES  Reviewed most current radiology test results   YES  Review and summation of old records today    NO  Reviewed patient's current orders and MAR    YES  PMH/SH reviewed - no change compared to H&P  ________________________________________________________________________  Care Plan discussed with:    Comments   Patient x    Family      RN x    Care Manager     Consultant  x Dr Saurabh Lezama team rounds were held today with , nursing, pharmacist and clinical coordinator. Patient's plan of care was discussed; medications were reviewed and discharge planning was addressed. ________________________________________________________________________  Total NON critical care TIME:  35   Minutes    Total CRITICAL CARE TIME Spent:   Minutes non procedure based      Comments   >50% of visit spent in counseling and coordination of care     ________________________________________________________________________  Martin Villa MD     Procedures: see electronic medical records for all procedures/Xrays and details which were not copied into this note but were reviewed prior to creation of Plan. LABS:  I reviewed today's most current labs and imaging studies. Pertinent labs include:  No results for input(s): WBC, HGB, HCT, PLT, HGBEXT, HCTEXT, PLTEXT, HGBEXT, HCTEXT, PLTEXT in the last 72 hours. Recent Labs     01/16/23  0323 01/15/23  0441   NA  --  138   K  --  3.5   CL  --  103   CO2  --  32   GLU  --  95   BUN  --  10   CREA  --  0.84   CA  --  8.1*   MG 1.8 1.8   PHOS 2.0* 1.8*         Signed:  Martin Villa MD

## 2023-01-17 NOTE — PROGRESS NOTES
Problem: Aspiration - Risk of  Goal: *Absence of aspiration  Outcome: Progressing Towards Goal     Problem: Falls - Risk of  Goal: *Absence of Falls  Description: Document Yoselin Fall Risk and appropriate interventions in the flowsheet. Outcome: Progressing Towards Goal  Note: Fall Risk Interventions:  Mobility Interventions: Communicate number of staff needed for ambulation/transfer    Mentation Interventions: Evaluate medications/consider consulting pharmacy    Medication Interventions: Evaluate medications/consider consulting pharmacy    Elimination Interventions: Patient to call for help with toileting needs    History of Falls Interventions:  Investigate reason for fall

## 2023-01-17 NOTE — PROGRESS NOTES
Hospitalist Progress Note    NAME: Fatou Pearce   :  1960   MRN:  [id number]       Assessment / Plan:  Acute on chronic hypoxic respiratory failure  Trach dislodgement  Influenza A infection   H/o Head and neck Ca  Was on Hospice care. He revoked hospice upon admission    Hypernatremia/hyperchloremia/dehydration       Anemia  Patient had dark stool   No more episode  Hemoglobin stable at 8.5      Hypokalemia, replete with KCl  Aspiration pna with recurrent aspiration  RRT on  patient pulled his trach replaced he is doing well  CXR showed increased interstitial lung opacities bilaterally with small right pleural effusion again noted. .    Cont' with IV abx, extend date due to recurrent aspiration  On midodrine tid. Off pressor support   Cont' with Tamiflu, nebs  Wean O2 as tolerated, currently 10 L on trach collar  Pt does not wish to be on the ventilator if he fails HF. Patient had event of possible aspiration again overnight, will held tube feeding again to Repeat CXR although no changes  During the day while he is awake  Will consult palliative care for support    Hypernatremia resolved      Replace electrolytes    HARVEY resolved      TAMICA: Ready discharge waiting placement      Code Status: DNR, was under hospice  Surrogate Decision Maker: Iris Acuna, [phone]. Hospice/[facility] [phone]  DVT Prophylaxis: lovenox   GI Prophylaxis: not indicated  Baseline: PEG/Trach under hospice care at [facility] [place]     Subjective:     Chief Complaint / Reason for Physician Visit  Seen patient today he is doing well he did not complain of any belly pain today  Discussed with RN events overnight.      Review of Systems:  Symptom Y/N Comments  Symptom Y/N Comments   Fever/Chills    Chest Pain     Poor Appetite    Edema     Cough    Abdominal Pain     Sputum    Joint Pain     SOB/CAI    Pruritis/Rash     Nausea/vomit    Tolerating PT/OT     Diarrhea    Tolerating Diet     Constipation    Other Could NOT obtain due to:      Objective:     VITALS:   Last 24hrs VS reviewed since prior progress note. Most recent are:  Patient Vitals for the past 24 hrs:   Temp Pulse Resp BP SpO2   01/17/23 0835 -- 97 -- 96/67 --   01/17/23 0736 97.3 °F (36.3 °C) 79 13 112/79 98 %   01/17/23 0400 98.6 °F (37 °C) 71 8 104/68 97 %   01/17/23 0154 -- 77 12 -- 99 %   01/17/23 0000 98.5 °F (36.9 °C) 84 11 121/78 96 %   01/16/23 2029 -- -- -- -- 92 %   01/16/23 2000 98.4 °F (36.9 °C) 82 12 95/75 92 %   01/16/23 1756 -- -- -- -- 93 %   01/16/23 1600 98 °F (36.7 °C) 80 17 104/69 96 %   01/16/23 1208 -- -- -- -- 95 %         Intake/Output Summary (Last 24 hours) at 1/17/2023 1149  Last data filed at 1/17/2023 0848  Gross per 24 hour   Intake 75 ml   Output 800 ml   Net -725 ml          I had a face to face encounter and independently examined this patient on 1/17/2023, as outlined below:  PHYSICAL EXAM:  General: Alert, nad, cachetic  EENT:  EOMI. Anicteric sclerae. MMM  Resp:  Coarse,  no wheezing or rales. No accessory muscle use  CV:  Regular  rhythm,  No edema  GI:  Soft, Non distended, Non tender. +PEG  Neurologic:  Alert and oriented X 3, nod to questions  Psych:   Not anxious nor agitated  Skin:  No rashes. No jaundice    Reviewed most current lab test results and cultures  YES  Reviewed most current radiology test results   YES  Review and summation of old records today    NO  Reviewed patient's current orders and MAR    YES  PMH/SH reviewed - no change compared to H&P  ________________________________________________________________________  Care Plan discussed with:    Comments   Patient x    Family      RN x    Care Manager     Consultant  x Dr Ryan Greenberg team rounds were held today with , nursing, pharmacist and clinical coordinator. Patient's plan of care was discussed; medications were reviewed and discharge planning was addressed. ________________________________________________________________________  Total NON critical care TIME:  35   Minutes    Total CRITICAL CARE TIME Spent:   Minutes non procedure based      Comments   >50% of visit spent in counseling and coordination of care     ________________________________________________________________________  Mirella Hess MD     Procedures: see electronic medical records for all procedures/Xrays and details which were not copied into this note but were reviewed prior to creation of Plan. LABS:  I reviewed today's most current labs and imaging studies. Pertinent labs include:  No results for input(s): WBC, HGB, HCT, PLT, HGBEXT, HCTEXT, PLTEXT, HGBEXT, HCTEXT, PLTEXT in the last 72 hours. Recent Labs     01/16/23  0323 01/15/23  0441   NA  --  138   K  --  3.5   CL  --  103   CO2  --  32   GLU  --  95   BUN  --  10   CREA  --  0.84   CA  --  8.1*   MG 1.8 1.8   PHOS 2.0* 1.8*         Signed:  Mirella Hess MD

## 2023-01-17 NOTE — PROGRESS NOTES
Bedside shift change report given to [facility], RN (oncoming nurse) by Zari Lynne RN (offgoing nurse). Report included the following information SBAR, Kardex, Cardiac Rhythm NSR, and Quality Measures. End of Shift Note    Bedside shift change report given to Katia Liu RN (oncoming nurse) by Justo Ortiz RN (offgoing nurse). Report included the following information SBAR, Kardex, Cardiac Rhythm NSR to Sinus tach, and Quality Measures    Shift worked:  day     Shift summary and any significant changes:     Trach and g-tube care done. Pain meds given. Plan is for patient to go via [facility] to hospice facility. Concerns for physician to address:       Zone phone for oncoming shift:   [phone]       Activity:  Activity Level: Bed Rest  Number times ambulated in hallways past shift: 0  Number of times OOB to chair past shift: 0    Cardiac:   Cardiac Monitoring: Yes      Cardiac Rhythm: Sinus Rhythm    Access:  Current line(s): port     Genitourinary:   Urinary status: pino    Respiratory:   O2 Device: Humidifier, Tracheal collar, Tracheostomy  Chronic home O2 use?: NO  Incentive spirometer at bedside: NO       GI:  Last Bowel Movement Date: 01/14/23  Current diet:  DIET NPO  ADULT TUBE FEEDING PEG; 2.0 Calorie; Delivery Method: Continuous; Continuous Initial Rate (mL/hr): 20; Continuous Advance Tube Feeding: Yes; Advancement Volume (mL/hr): 10; Advancement Frequency: Q 8 hours; Continuous Goal Rate (mL/hr): 60; Water . .. DIET ONE TIME MESSAGE  Passing flatus: unsure but has bowel sounds  Tolerating current diet: NPO       Pain Management:   Patient states pain is manageable on current regimen: YES    Skin:  Edward Score: 13  Interventions: float heels, increase time out of bed, foam dressing, PT/OT consult, limit briefs, and internal/external urinary devices    Patient Safety:  Fall Score:  Total Score: 3  Interventions: bed/chair alarm, gripper socks, and pt to call before getting OOB  High Fall Risk: Yes    Length of Stay:  Expected LOS: 4d 9h  Actual LOS: 541 Ash Chance, RN

## 2023-01-17 NOTE — CONSULTS
Palliative Medicine  [phone]    Noted that patient has decided to discharge back with [facility]  Will sign off as goals have been determined    Ozzy Freedman NP

## 2023-01-17 NOTE — PROGRESS NOTES
Pulmonary, Critical Care, and Sleep Medicine    Name: Dilma Waller MRN: [id number]   : 1960 Hospital: [address]   Date: 2023          IMPRESSION:   Hypoxic respiratory failure on high flow via trach collar. 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 Having persistent/recurrent issues with tube feeds with reflux and aspiration  Aspiration pneumonia -ON ABX; CXR from : reveals diffuse Airspace disease with more infiltrates on the Right lung. Influenza A+ 23, on Tamiflu. COVID negative  Hypotension-now better. PEG tube for nutrition-still some ongoing issues with his tolerance of the tube feeds. DNR status. Was on hospice prior to admission      RECOMMENDATIONS:   O2 - wean as tolerated  Aspiration precautions with HOB always elevated, this needs to be strictly adhered to. If pt needs to lie flat for testing or procedure would hold  tube feeds for several hours. IV antibiotics   On Midodrine 10mg po tid. Challenging situation. If he has ongoing issues with aspiration, may need jejunostomy for feeding, but he was also on hospice prior to admission, so this may be overly aggressive. I do think this will be a recurrent situation if he continues tube feeds from his current PEG. Palliative care evaluation is pending. Reinstituting hospice seems like it would be appropriate. Subjective:     2023: Pulled out his tracheostomy last night and had to have it replaced. He is somewhat difficult to understand, but he said to me and the nurse in the room \"I want to go home\". He did not give any other specific complaints. 23: no events. Weaning O2, now down to 50% via TC. Nursing reports that every time tube feeds are restarted, he aspirates. Initial history: This patient has been seen and evaluated at the request of Dr. Nain Marshall for hypoxia, flu / aspiration and tracheostomy status.  Patient is a 57 y.o. male admitted here on 1/4 b/c of trach dislodgement. He was hypoxic and diagnosed with Influenza A. He's been on HF O2. During trach care / suctioning yesterday, he vomited and the RT witnessed an aspiration event. He's on Zosyn. He has a PEG tube. He was in hospice care until he was admitted. Hypotension has been an issue and he's on midodrine and norepinephrine gtt. Pressures today ranging  systolic. Lactate has been normal.  WBC has fluctuated, being normal on 1/5 and now up to 18.2  Tmax 100.2 in the past 24 hours. 1/4 paired blood cultures without growth      He is awake and wants iced tea. Denies chest pain or bothersome cough. ROS is limited to respiratory system due to pt's lack of verbal communication. Past Medical History:   Diagnosis Date    Cancer ([facility] [address]     Tongue and lung      Past Surgical History:   Procedure Laterality Date    HX OTHER SURGICAL      HX VASCULAR ACCESS      OK UNLISTED PROCEDURE ABDOMEN PERITONEUM & OMENTUM        Prior to Admission medications    Medication Sig Start Date End Date Taking? Authorizing Provider   midodrine (PROAMATINE) 10 mg tablet Take 1 Tablet by mouth three (3) times daily for 30 days. 1/16/23 2/15/23 Yes Juan Reyes MD     No Known Allergies   Social History     Tobacco Use    Smoking status: Not on file    Smokeless tobacco: Not on file   Substance Use Topics    Alcohol use: Not on file      History reviewed. No pertinent family history.      Current Facility-Administered Medications   Medication Dose Route Frequency    pantoprazole (PROTONIX) 40 mg in 0.9% sodium chloride 10 mL injection  40 mg IntraVENous DAILY    zinc oxide-cod liver oil (DESITIN) 40 % paste   Topical BID and QHS    sucralfate (CARAFATE) tablet 1 g  1 g Oral AC&HS    midodrine (PROAMATINE) tablet 10 mg  10 mg Oral TID    dextrose 5% infusion  50 mL/hr IntraVENous CONTINUOUS    sodium chloride (NS) flush 5-40 mL  5-40 mL IntraVENous Q8H    enoxaparin (LOVENOX) injection 40 mg  40 mg SubCUTAneous DAILY       Review of Systems:  Pertinent items are noted in HPI. Objective:   Vital Signs:    Visit Vitals  BP 96/67   Pulse 97   Temp 97.3 °F (36.3 °C)   Resp 13   Ht 6' 5\" (1.956 m)   Wt 54.6 kg (120 lb 5.9 oz)   SpO2 98%   BMI 14.27 kg/m²       O2 Device: Humidifier, Tracheal collar, Tracheostomy   O2 Flow Rate (L/min): 10 l/min   Temp (24hrs), Av.2 °F (36.8 °C), Min:97.3 °F (36.3 °C), Max:98.6 °F (37 °C)       Intake/Output:   Last shift:       07 -  190  In: 75   Out: 800 [Urine:800]  Last 3 shifts: 01/15 1901 -  0700  In: 999 [I.V.:999]  Out: 900 [Urine:900]    Intake/Output Summary (Last 24 hours) at 2023 1044  Last data filed at 2023 0848  Gross per 24 hour   Intake 75 ml   Output 800 ml   Net -725 ml        Physical Exam:   General:  Cachectic, NAD   Head:  Normocephalic, without obvious abnormality, atraumatic. Eyes:  Conjunctivae/corneas clear. PERRL, EOMs intact. Nose: Nares normal.    Neck: Supple, symmetrical, tracheostomy with HF O2 via trach collar. Lungs:   Scattered ronchi    Heart:  Regular rate and rhythm    Abdomen:   Soft, non-tender. PEG tube, his abdomen is soft. Tube feeds currently on hold. Extremities: Extremities normal, atraumatic, no cyanosis    Skin: No rashes or lesions   Neurologic: Grossly nonfocal.       Data:   Labs:  No results for input(s): WBC, HGB, HCT, PLT, HGBEXT, HCTEXT, PLTEXT, HGBEXT, HCTEXT, PLTEXT in the last 72 hours. Recent Labs     23  0323 01/15/23  0441   NA  --  138   K  --  3.5   CL  --  103   CO2  --  32   GLU  --  95   BUN  --  10   CREA  --  0.84   CA  --  8.1*   MG 1.8 1.8   PHOS 2.0* 1.8*       No results for input(s): PHI, PCO2I, PO2I, HCO3I, FIO2I in the last 72 hours.     Imaging:  I have personally reviewed the patients radiographs:  None today        Guero Wilson MD

## 2023-01-17 NOTE — PROGRESS NOTES
Transition of Care Plan:    RUR:12%  Disposition: [facility] [facility] with [address]    Accepting facility: [facility] [facility]  Follow up appointments: with PCP  Transportation at Discharge: [address] [facility] or means to access home:  N/A      I-M Medicare Letter:  -Caregiver Contact: Rupesh Krishnamurthy [phone]  Discharge Caregiver contacted prior to discharge? If pt desires  Care Conference needed?:  N/A    ANGELINA along with [facility]  met with pt. Pt agrees to go back to [facility] with hospice. CM spoke with [facility] Ravalli Kulwant at [facility] via [phone], who reports they can not accept patient until they received ordered supplies for pt's new oxygen needs. Expected day of delivery is late today. Will follow up at a later time to check for delivery of items needed. Brandt with [facility] informed. Pt updated. Brandt @ [employer] [phone]; Alternate contact [place] Juan Jose  [phone], requesting update on pt's discharge when [facility] is prepared to receive pt.         2:30pm Update: Per [address] @ [address] [place], DON at [facility], they can receive pt tomorrow due to wait on equipment for oxygen for pt. Pt ready for am discharge.        3:21pm Update: Confirmed with JOANNE Wells at [facility] they can receive pt early am tomorrow, [facility] transport set up for 9am.  PCR completed and nursing staff informed.

## 2023-01-18 VITALS
HEART RATE: 95 BPM | HEIGHT: 77 IN | RESPIRATION RATE: 15 BRPM | WEIGHT: 120.37 LBS | DIASTOLIC BLOOD PRESSURE: 81 MMHG | SYSTOLIC BLOOD PRESSURE: 113 MMHG | BODY MASS INDEX: 14.21 KG/M2 | OXYGEN SATURATION: 92 % | TEMPERATURE: 97.8 F

## 2023-01-18 PROCEDURE — 74011000250 HC RX REV CODE- 250: Performed by: INTERNAL MEDICINE

## 2023-01-18 PROCEDURE — 94640 AIRWAY INHALATION TREATMENT: CPT

## 2023-01-18 PROCEDURE — 74011250637 HC RX REV CODE- 250/637: Performed by: PHYSICIAN ASSISTANT

## 2023-01-18 PROCEDURE — 74011000250 HC RX REV CODE- 250: Performed by: GENERAL ACUTE CARE HOSPITAL

## 2023-01-18 PROCEDURE — 74011250636 HC RX REV CODE- 250/636: Performed by: INTERNAL MEDICINE

## 2023-01-18 PROCEDURE — 74011250637 HC RX REV CODE- 250/637: Performed by: INTERNAL MEDICINE

## 2023-01-18 PROCEDURE — C9113 INJ PANTOPRAZOLE SODIUM, VIA: HCPCS | Performed by: STUDENT IN AN ORGANIZED HEALTH CARE EDUCATION/TRAINING PROGRAM

## 2023-01-18 PROCEDURE — 74011250636 HC RX REV CODE- 250/636: Performed by: GENERAL ACUTE CARE HOSPITAL

## 2023-01-18 PROCEDURE — 77010033678 HC OXYGEN DAILY

## 2023-01-18 PROCEDURE — 74011000250 HC RX REV CODE- 250: Performed by: STUDENT IN AN ORGANIZED HEALTH CARE EDUCATION/TRAINING PROGRAM

## 2023-01-18 PROCEDURE — 74011250636 HC RX REV CODE- 250/636: Performed by: STUDENT IN AN ORGANIZED HEALTH CARE EDUCATION/TRAINING PROGRAM

## 2023-01-18 RX ORDER — HEPARIN 100 UNIT/ML
300 SYRINGE INTRAVENOUS AS NEEDED
Status: DISCONTINUED | OUTPATIENT
Start: 2023-01-18 | End: 2023-01-18 | Stop reason: HOSPADM

## 2023-01-18 RX ADMIN — ENOXAPARIN SODIUM 40 MG: 100 INJECTION SUBCUTANEOUS at 08:35

## 2023-01-18 RX ADMIN — Medication: at 08:49

## 2023-01-18 RX ADMIN — OXYCODONE 5 MG: 5 TABLET ORAL at 08:39

## 2023-01-18 RX ADMIN — MIDODRINE HYDROCHLORIDE 10 MG: 5 TABLET ORAL at 08:35

## 2023-01-18 RX ADMIN — SODIUM CHLORIDE, PRESERVATIVE FREE 10 ML: 5 INJECTION INTRAVENOUS at 08:31

## 2023-01-18 RX ADMIN — SODIUM CHLORIDE 40 MG: 9 INJECTION, SOLUTION INTRAMUSCULAR; INTRAVENOUS; SUBCUTANEOUS at 08:38

## 2023-01-18 RX ADMIN — IPRATROPIUM BROMIDE AND ALBUTEROL SULFATE 3 ML: 2.5; .5 SOLUTION RESPIRATORY (INHALATION) at 09:28

## 2023-01-18 RX ADMIN — SUCRALFATE 1 G: 1 TABLET ORAL at 08:30

## 2023-01-18 RX ADMIN — MORPHINE SULFATE 2 MG: 2 INJECTION, SOLUTION INTRAMUSCULAR; INTRAVENOUS at 02:16

## 2023-01-18 RX ADMIN — HEPARIN SODIUM (PORCINE) LOCK FLUSH IV SOLN 100 UNIT/ML 300 UNITS: 100 SOLUTION at 09:59

## 2023-01-18 NOTE — PROGRESS NOTES
Problem: Risk for Spread of Infection  Goal: Prevent transmission of infectious organism to others  Description: Prevent the transmission of infectious organisms to other patients, staff members, and visitors. Outcome: Progressing Towards Goal     Problem: Falls - Risk of  Goal: *Absence of Falls  Description: Document Dennie Rous Fall Risk and appropriate interventions in the flowsheet. Outcome: Progressing Towards Goal  Note: Fall Risk Interventions:  Mobility Interventions: Assess mobility with egress test, Bed/chair exit alarm, Communicate number of staff needed for ambulation/transfer, Patient to call before getting OOB, PT Consult for mobility concerns, Strengthening exercises (ROM-active/passive)    Mentation Interventions: Adequate sleep, hydration, pain control, Bed/chair exit alarm, Evaluate medications/consider consulting pharmacy, More frequent rounding, Reorient patient, Room close to nurse's station, Toileting rounds, Update white board    Medication Interventions: Assess postural VS orthostatic hypotension, Bed/chair exit alarm, Evaluate medications/consider consulting pharmacy, Patient to call before getting OOB, Teach patient to arise slowly    Elimination Interventions: Bed/chair exit alarm, Call light in reach, Patient to call for help with toileting needs, Stay With Me (per policy), Toileting schedule/hourly rounds    History of Falls Interventions: Bed/chair exit alarm, Door open when patient unattended, Evaluate medications/consider consulting pharmacy, Investigate reason for fall, Room close to nurse's station, Assess for delayed presentation/identification of injury for 48 hrs (comment for end date), Vital signs minimum Q4HRs X 24 hrs (comment for end date)         Problem: Pressure Injury - Risk of  Goal: *Prevention of pressure injury  Description: Document Edward Scale and appropriate interventions in the flowsheet.   Outcome: Progressing Towards Goal  Note: Pressure Injury Interventions:  Sensory Interventions: Assess changes in LOC, Check visual cues for pain, Discuss PT/OT consult with provider, Float heels, Keep linens dry and wrinkle-free, Maintain/enhance activity level, Minimize linen layers, Monitor skin under medical devices, Turn and reposition approx. every two hours (pillows and wedges if needed)    Moisture Interventions: Absorbent underpads, Check for incontinence Q2 hours and as needed, Internal/External urinary devices, Maintain skin hydration (lotion/cream), Minimize layers, Offer toileting Q_hr    Activity Interventions: Assess need for specialty bed, Increase time out of bed, PT/OT evaluation    Mobility Interventions: Assess need for specialty bed, Float heels, HOB 30 degrees or less, PT/OT evaluation, Turn and reposition approx.  every two hours(pillow and wedges)    Nutrition Interventions: Document food/fluid/supplement intake, Discuss nutritional consult with provider, Offer support with meals,snacks and hydration    Friction and Shear Interventions: Apply protective barrier, creams and emollients, Foam dressings/transparent film/skin sealants, HOB 30 degrees or less, Lift sheet, Minimize layers

## 2023-01-18 NOTE — PROGRESS NOTES
1918: Bedside and Verbal shift change report given to Nica Cleary RN (oncoming nurse) by Sedrick Jacques RN (offgoing nurse). Report included the following information SBAR, Kardex, Intake/Output, MAR, Recent Results, Med Rec Status, and Cardiac Rhythm NSR .    2020: Assessment completed at bedside. Call bell within reach. Bed alarm turned on at this time. See flowsheet for details. 2025: PRN morphine given as ordered, see flowsheet and MAR for details. 2253: Scheduled meds given at this time. 2335: Reassessment completed at bedside. Patient provided with trach suction at bedside. PRN morphine given as ordered at 2323. See flowsheet for details. 0216 01/18/2023: PRN morphine given as ordered, see flowsheet and MAR for details. 0401: Reassessment completed at bedside. See flowsheet for details. 6205: End of Shift Note    Bedside shift change report given to Sedrick Jacques RN (oncoming nurse) by Nica Cleary RN (offgoing nurse). Report included the following information SBAR, Kardex, Intake/Output, MAR, Recent Results, Med Rec Status, and Cardiac Rhythm NSR    Shift worked:  1012-1519     Shift summary and any significant changes:     No acute changes to report. Concerns for physician to address:  No acute changes to report.       [facility]   [phone]       Activity:  Activity Level: Bed Rest  Number times ambulated in hallways past shift: 0  Number of times OOB to chair past shift: 0    Cardiac:   Cardiac Monitoring: Yes      Cardiac Rhythm: Sinus Rhythm    Access:  Current line(s): port     Genitourinary:   Urinary status: pino    Respiratory:   O2 Device: Humidifier, Tracheal collar  Chronic home O2 use?: YES  Incentive spirometer at bedside: NO       GI:  Last Bowel Movement Date: 01/14/23  Current diet:  DIET NPO  ADULT TUBE FEEDING PEG; 2.0 Calorie; Delivery Method: Continuous; Continuous Initial Rate (mL/hr): 20; Continuous Advance Tube Feeding: Yes; Advancement Volume (mL/hr): 10; Advancement Frequency: Q 8 hours; Continuous Goal Rate (mL/hr): 60; Water . .. DIET ONE TIME MESSAGE  Passing flatus: YES  Tolerating current diet: YES       Pain Management:   Patient states pain is manageable on current regimen: NO    Skin:  Edward Score: 13  Interventions: float heels, internal/external urinary devices, and nutritional support     Patient Safety:  Fall Score:  Total Score: 3  Interventions: bed/chair alarm and stay with me (per policy)  High Fall Risk: Yes    Length of Stay:  Expected LOS: 4d 9h  Actual LOS: [address]

## 2023-01-18 NOTE — PROGRESS NOTES
Bedside shift change report given [facility], RN (oncoming nurse) by Benoit Boss RN (offgoing nurse). Report included the following information SBAR, Kardex, Cardiac Rhythm NSR, and Quality Measures. 1030--[facility]. Tried to call to give report. Will call again. 1112--Report given to Unique Cheek RN from [employer].

## 2023-01-18 NOTE — PROGRESS NOTES
Transition of Care Plan:     RUR:12%  Disposition: [facility] with [address]     Accepting facility: [facility]  Follow up appointments: with PCP  Transportation at Discharge: Medical Transport aranged for 9:00am  Keys or means to access home:  N/A      I-M Medicare Letter:  -Caregiver Contact: Carina Cheney [phone]  Discharge Caregiver contacted prior to discharge? If pt desires  Care Conference needed?:  N/A        Transition of Care Plan to SNF/Rehab    SNF/Rehab Transition:  Patient has been accepted to [facility] and meets criteria for admission. Patient will transported by [facility] and expected to leave at 0900am..    Communication to Patient/Family:  Met with patient and he are agreeable to the transition plan. Communication to SNF/Rehab:  Bedside RN, Rita, has been notified to update the transition plan to the facility and call report (phone number [phone]). Discharge information has been updated on the AVS.     Discharge instructions to be fax'd to facility at [facility] # [phone]). Nursing Please include all hard scripts for controlled substances, med rec and dc summary, and AVS in packet.      Reviewed and confirmed with facility, [facility], can manage the patient care needs for the following:     Mikey Donohue with (X) only those applicable:    Medication:  [x]  Medications will be available at the facility  []  IV Antibiotics   []  Controlled Substance - hard copy to be sent with patient   []  Weekly Labs   Documents:  [] Hard RX  [] MAR  [] Kardex  [x] AVS  []Transfer Summary  []Discharge   Equipment:  []  CPAP/BiPAP  []  Wound Vacuum  []  Prieto or Urinary Device  []  PICC/Central Line  []  Nebulizer  []  Ventilator   Treatment:  []Isolation (for MRSA, VRE, etc.)  []Surgical Drain Management  [x]Tracheostomy Care  []Dressing Changes  []Dialysis with transportation and chair time   [x]PEG Care  [x]Oxygen -     Dwayne Lombard Weights for Heart Failure   Dietary:  []Any diet limitations  [x]Tube Feedings   []Total Parenteral Management (TPN)   Eligible for Medicaid Long Term Services and Supports  Yes:  [] Eligible for medical assistance or will become eligible within 180 days and UAI completed. [] Provider/Patient and/or support system has requested screening. [] UAI copy provided to patient or responsible party,   [] UAI unavailable at discharge will send once processed to SNF provider. [] UAI unavailable at discharged mailed to patient  No:   [x] Private pay and is not financially eligible for Medicaid within the next 180 days. [] Reside out-of-state. [] A residents of a state owned/operated facility that is licensed  by [place] [address] [employer] or [facility]  [] Enrollment in [facility] hospice services  [] [address]  [] Patient /Family declines to have screening completed or provide financial information for screening     Financial Resources:  Medicaid    [] Initiated and application pending   [] Full coverage     Advanced Care Plan:  []Surrogate Decision Maker of Care  []POA  [x]Communicated Code Status - DNR   Other       Care Management Interventions  PCP Verified by CM:  Yes  Mode of Transport at Discharge: S  Transition of Care Consult (CM Consult): [address] [facility], Discharge Planning ([address] and Rehab)  [place]: Lynne  Confirm Follow Up Transport: [facility]  Discharge Location  Patient Expects to be Discharged to[de-identified] [facility]  Phone: [phone]

## 2023-01-18 NOTE — PROGRESS NOTES
Problem: Aspiration - Risk of  Goal: *Absence of aspiration  Outcome: Progressing Towards Goal     Problem: Falls - Risk of  Goal: *Absence of Falls  Description: Document Yoselin Fall Risk and appropriate interventions in the flowsheet. Outcome: Progressing Towards Goal  Note: Fall Risk Interventions:  Mobility Interventions: Communicate number of staff needed for ambulation/transfer    Mentation Interventions: Evaluate medications/consider consulting pharmacy    Medication Interventions: Evaluate medications/consider consulting pharmacy    Elimination Interventions: Patient to call for help with toileting needs    History of Falls Interventions: Investigate reason for fall         Problem: Pressure Injury - Risk of  Goal: *Prevention of pressure injury  Description: Document Edward Scale and appropriate interventions in the flowsheet.   Outcome: Progressing Towards Goal  Note: Pressure Injury Interventions:  Sensory Interventions: Assess changes in LOC, Float heels, Keep linens dry and wrinkle-free, Maintain/enhance activity level, Minimize linen layers    Moisture Interventions: Absorbent underpads, Minimize layers    Activity Interventions: Increase time out of bed    Mobility Interventions: HOB 30 degrees or less, PT/OT evaluation    Nutrition Interventions: Document food/fluid/supplement intake    Friction and Shear Interventions: Apply protective barrier, creams and emollients, Lift sheet, Minimize layers                Problem: Infection - Risk of, Urinary Catheter-Associated Urinary Tract Infection  Goal: *Absence of infection signs and symptoms  Outcome: Progressing Towards Goal